# Patient Record
Sex: FEMALE | Race: BLACK OR AFRICAN AMERICAN | NOT HISPANIC OR LATINO | Employment: FULL TIME | URBAN - METROPOLITAN AREA
[De-identification: names, ages, dates, MRNs, and addresses within clinical notes are randomized per-mention and may not be internally consistent; named-entity substitution may affect disease eponyms.]

---

## 2017-01-31 ENCOUNTER — HOSPITAL ENCOUNTER (OUTPATIENT)
Dept: PREADMISSION TESTING | Facility: HOSPITAL | Age: 32
Discharge: HOME | End: 2017-01-31
Attending: OBSTETRICS & GYNECOLOGY
Payer: MEDICAID

## 2017-01-31 VITALS
HEIGHT: 69 IN | DIASTOLIC BLOOD PRESSURE: 80 MMHG | WEIGHT: 250 LBS | SYSTOLIC BLOOD PRESSURE: 132 MMHG | HEART RATE: 80 BPM | OXYGEN SATURATION: 98 % | BODY MASS INDEX: 37.03 KG/M2 | TEMPERATURE: 98 F | RESPIRATION RATE: 16 BRPM

## 2017-01-31 DIAGNOSIS — R10.2 PELVIC PAIN IN FEMALE: ICD-10-CM

## 2017-01-31 LAB
ANION GAP SERPL CALC-SCNC: 7 MMOL/L
APTT BLDCRRT: 28.4 SEC
BASOPHILS # BLD AUTO: 0.01 K/UL
BASOPHILS NFR BLD: 0.2 %
BILIRUB UR QL STRIP: NEGATIVE
BUN SERPL-MCNC: 15 MG/DL
CALCIUM SERPL-MCNC: 8.9 MG/DL
CHLORIDE SERPL-SCNC: 108 MMOL/L
CLARITY UR: CLEAR
CO2 SERPL-SCNC: 24 MMOL/L
COLOR UR: YELLOW
CREAT SERPL-MCNC: 0.7 MG/DL
DIFFERENTIAL METHOD: ABNORMAL
EOSINOPHIL # BLD AUTO: 0.1 K/UL
EOSINOPHIL NFR BLD: 0.9 %
ERYTHROCYTE [DISTWIDTH] IN BLOOD BY AUTOMATED COUNT: 13.1 %
EST. GFR  (AFRICAN AMERICAN): >60 ML/MIN/1.73 M^2
EST. GFR  (NON AFRICAN AMERICAN): >60 ML/MIN/1.73 M^2
GLUCOSE SERPL-MCNC: 89 MG/DL
GLUCOSE UR QL STRIP: NEGATIVE
HCT VFR BLD AUTO: 35.8 %
HGB BLD-MCNC: 11.7 G/DL
HGB UR QL STRIP: NEGATIVE
INR PPP: 1
KETONES UR QL STRIP: NEGATIVE
LEUKOCYTE ESTERASE UR QL STRIP: NEGATIVE
LYMPHOCYTES # BLD AUTO: 2 K/UL
LYMPHOCYTES NFR BLD: 36 %
MCH RBC QN AUTO: 26.2 PG
MCHC RBC AUTO-ENTMCNC: 32.7 %
MCV RBC AUTO: 80 FL
MONOCYTES # BLD AUTO: 0.4 K/UL
MONOCYTES NFR BLD: 7.3 %
NEUTROPHILS # BLD AUTO: 3.1 K/UL
NEUTROPHILS NFR BLD: 55.4 %
NITRITE UR QL STRIP: NEGATIVE
PH UR STRIP: 6 [PH] (ref 5–8)
PLATELET # BLD AUTO: 313 K/UL
PMV BLD AUTO: 10.4 FL
POTASSIUM SERPL-SCNC: 4.2 MMOL/L
PROT UR QL STRIP: NEGATIVE
PROTHROMBIN TIME: 10.4 SEC
RBC # BLD AUTO: 4.47 M/UL
SODIUM SERPL-SCNC: 139 MMOL/L
SP GR UR STRIP: 1.02 (ref 1–1.03)
URN SPEC COLLECT METH UR: NORMAL
UROBILINOGEN UR STRIP-ACNC: NEGATIVE EU/DL
WBC # BLD AUTO: 5.58 K/UL

## 2017-01-31 PROCEDURE — 81003 URINALYSIS AUTO W/O SCOPE: CPT

## 2017-01-31 PROCEDURE — 85025 COMPLETE CBC W/AUTO DIFF WBC: CPT

## 2017-01-31 PROCEDURE — 85730 THROMBOPLASTIN TIME PARTIAL: CPT

## 2017-01-31 PROCEDURE — 36415 COLL VENOUS BLD VENIPUNCTURE: CPT

## 2017-01-31 PROCEDURE — 80048 BASIC METABOLIC PNL TOTAL CA: CPT

## 2017-01-31 PROCEDURE — 85610 PROTHROMBIN TIME: CPT

## 2017-01-31 RX ORDER — ZOLPIDEM TARTRATE 10 MG/1
10 TABLET ORAL NIGHTLY
COMMUNITY
End: 2019-11-21

## 2017-01-31 RX ORDER — ESCITALOPRAM OXALATE 10 MG/1
10 TABLET ORAL DAILY
COMMUNITY
End: 2017-09-05

## 2017-01-31 RX ORDER — ALPRAZOLAM 1 MG/1
1 TABLET ORAL 2 TIMES DAILY PRN
COMMUNITY
End: 2017-09-05

## 2017-01-31 NOTE — PLAN OF CARE
Pre-operative instructions, medication directives and pain scales reviewed with Ms. Silva. All questions the patient had  were answered. Re-assurance about surgical procedure and day of surgery routine given as needed. The patient verbalized understanding of the pre-op instructions.      2/7/17 EXPLORATORY-LAPAROTOMY POSSIBLE PFANNENSTIEL SCAR REVISION

## 2017-01-31 NOTE — IP AVS SNAPSHOT
[place] [address] [address] [place] [address]  Phone: [phone]           Patient Discharge Instructions    Our goal is to set you up for success. This packet includes information on your condition, medications, and your home care. It will help you to care for yourself so you don't get sicker.     Please ask your nurse if you have any questions.        There are many details to remember when preparing for your surgery. Here is what you will need to do, please ask your nurse if there are more specific instructions and if you have any questions:    1. 24 hours before procedure Do not smoke or drink alcoholic beverages 24 hours prior to your procedure    2. Eating before procedure Do not eat or drink anything 8 hours before your procedure - this includes gum, mints, and candy.     3. Day of procedure Please remove all jewelry for the procedure. If you wear contact lenses, dentures, hearing aids or glasses, bring a container to put them in during your surgery and give to a family member for safekeeping.  If your doctor has scheduled you for an overnight stay, bring a small overnight bag with any personal items that you need.    4. After procedure Make arrangements in advance for transportation home by a responsible adult. It is not safe to drive a vehicle during the 24 hours following surgery.     PLEASE NOTE: You may be contacted the day before your surgery to confirm your surgery date and arrival time. The Surgery schedule has many variables which may affect the time of your surgery case. Family members should be available if your surgery time changes.                ** Verify the list of medication(s) below is accurate and up to date. Carry this with you in case of emergency. If your medications have changed, please notify your healthcare provider.             Medication List      TAKE these medications        Additional Info                      alprazolam 1 MG tablet   Commonly known  as:  XANAX   Refills:  0   Dose:  1 mg    Instructions:  Take 1 mg by mouth 2 (two) times daily.     Begin Date    AM    Noon    PM    Bedtime       escitalopram oxalate 10 MG tablet   Commonly known as:  LEXAPRO   Refills:  0   Dose:  10 mg    Instructions:  Take 10 mg by mouth once daily.     Begin Date    AM    Noon    PM    Bedtime       ibuprofen 600 MG tablet   Commonly known as:  ADVIL,MOTRIN   Quantity:  60 tablet   Refills:  1   Dose:  600 mg   What changed:  Another medication with the same name was removed. Continue taking this medication, and follow the directions you see here.    Instructions:  Take 1 tablet (600 mg total) by mouth every 6 (six) hours as needed.     Begin Date    AM    Noon    PM    Bedtime       * oxycodone-acetaminophen 5-325 mg per tablet   Commonly known as:  PERCOCET   Quantity:  20 tablet   Refills:  0   Dose:  1 tablet   What changed:  Another medication with the same name was removed. Continue taking this medication, and follow the directions you see here.    Instructions:  Take 1 tablet by mouth every 4 (four) hours as needed for Pain.     Begin Date    AM    Noon    PM    Bedtime       * oxycodone-acetaminophen 5-325 mg per tablet   Commonly known as:  PERCOCET   Quantity:  30 tablet   Refills:  0   Dose:  1 tablet   What changed:  Another medication with the same name was removed. Continue taking this medication, and follow the directions you see here.    Instructions:  Take 1 tablet by mouth every 4 (four) hours as needed for Pain.     Begin Date    AM    Noon    PM    Bedtime       * oxycodone-acetaminophen 5-325 mg per tablet   Commonly known as:  PERCOCET   Quantity:  10 tablet   Refills:  0   Dose:  1 tablet   What changed:  Another medication with the same name was removed. Continue taking this medication, and follow the directions you see here.    Instructions:  Take 1 tablet by mouth every 4 (four) hours as needed for Pain.     Begin Date    AM    Noon    PM     Bedtime       zolpidem 10 mg Tab   Commonly known as:  AMBIEN   Refills:  0   Dose:  10 mg    Instructions:  Take 10 mg by mouth every evening.     Begin Date    AM    Noon    PM    Bedtime       * Notice:  This list has 3 medication(s) that are the same as other medications prescribed for you. Read the directions carefully, and ask your doctor or other care provider to review them with you.               Please bring to all follow up appointments:    1. A copy of your discharge instructions.  2. All medicines you are currently taking in their original bottles.  3. Identification and insurance card.    Please arrive 15 minutes ahead of scheduled appointment time.    Please call 24 hours in advance if you must reschedule your appointment and/or time.        Your Scheduled Appointments     Feb 06, 2017  9:10 AM CST   Non-Fasting Lab with [facility]    [address]  [facility] [address]   [phone]            Feb 21, 2017  9:10 AM CST   Post Op-OCC with Chad Arambula MD   [facility] ([facility]    [address] [place] [id number]   [phone]              Your Future Surgeries/Procedures     Feb 07, 2017   Surgery with Chad Arambula MD   [facility]    [facility] [address]   [phone]                Discharge Instructions     Future Orders    Type & Screen         Discharge Instructions           YOU WILL HAVE TO GIVE A URINE SPECIMEN TO THE NURSE WHO ADMITS YOU ON THE MORNING OF SURGERY.  Please do not urinate until the nurse gives you a specimen cup.  Thank you.      Your surgery is scheduled for ____TUESDAY 2/7/17________________.    Call [phone] between 2 p.m. and 5 p.m. on   ____Monday 2/6/17_____ to find out your arrival time for the day of your surgery.      Please report to SAME DAY SURGERY UNIT on the 2nd FLOOR at _______ a.m.  Use front door entrance. The  doors open at 0530 am.      INSTRUCTIONS IMPORTANT!!!  ¨ Do not eat or drink after 12 midnight-including water. OK to brush teeth, no   gum, candy or mints!    ¨ Take only these medicines with a small swallow of water-morning of surgery.    Xanax, LEXAPRO, (and Tylenol if needed)    For this procedure you will shower at home the night before and also the morning of surgery with HIBICLENS soap provided by the pre op nurse. Do not use this soap on your face or genitals.  Wash inside your navel (belly button) and the entire abdominal  first.  Allow the soap to sit there at least two full minutes. Complete the shower and rinse thoroughly.     You MAY  shower a third time here at the hospital on the morning of surgery. Rinse completely after each shower.    Please place clean linens on your bed the night before surgery. Please wear fresh clean clothing after each shower.    No shaving of procedural area at least 4-5 days before surgery due to       increased risk of skin irritation and/or possible infection.        XXX____  Return to Hospital Lab on ___Monday 2/6/17 at 9 am_____for additional blood test.    X____  Do not wear makeup, including mascara. WEARING EYE MAKEUP MAY                   LEAD TO SERIOUS EYE INJURY during surgery.  X____  No powder, lotions or creams to surgical area. No Deodorant.  X____  Please remove all jewelry, including piercings and leave at home.  X____  No money or valuables needed. Please leave at home.  You may bring your   cell phone.  X____  If going home the same day, arrange for a ride home. You will not be able to   drive if Anesthesia was used.  X____  Wear loose fitting clothing. Allow for dressings, bandages.  X____  NO  Aspirin, Ibuprofen, Motrin and Aleve at least 3-5 days before     surgery, unless otherwise instructed by your doctor, or the nurse.              You MAY use Tylenol/acetaminophen until day of surgery.  X____  Call MD for temperature above 101 degrees.               "  I have read or had read and explained to me, and understand the above information.  Additional comments or instructions:Please call   [phone] if you have any questions regarding the instructions above.                 Admission Information     Date & Time Provider Department CSN    1/31/2017 10:30 AM Chad Arambula MD [facility] [id number]      Care Providers     Provider Role Specialty Primary office phone    Chad Arambula MD Attending Provider Obstetrics and Gynecology [phone]      Your Vitals Were     BP Pulse Temp Resp Height Weight    132/80 (BP Location: Left arm, Patient Position: Sitting, BP Method: Automatic) 80 97.7 °F (36.5 °C) (Oral) 16 5' 9" (1.753 m) 113.4 kg (250 lb)    Last Period SpO2 BMI          01/05/2017 (Exact Date) 98% 36.92 kg/m2        Recent Lab Values        10/6/2012                           6:20 AM           A1C 5.9                       Allergies as of 1/31/2017        Reactions    Tramadol     Stomach upset and vomiting       [facility]     [facility] Nurse Care Line - 24/7 Assistance  Unless otherwise directed by your provider, please contact [facility], our nurse care line that is available for 24/7 assistance.     Registered nurses in the [facility] provide clinical advisement, health education, appointment booking, and other advisory services.  Call for this free service at [phone].        Advance Directives     An advance directive is a document which, in the event you are no longer able to make decisions for yourself, tells your healthcare team what kind of treatment you do or do not want to receive, or who you would like to make those decisions for you.  If you do not currently have an advance directive, [facility] encourages you to create one.  For more information call:  [phone] ([phone]), [phone] ([phone]),  or log on to [email or web address].        Smoking Cessation     If you would like to " quit smoking:   You may be eligible for free services if you are a [place] resident and started smoking cigarettes before September 1, 1988.  Call the Smoking Cessation Trust (SCT) toll free at (715) 972-1089 or (292) 839-2559.   Call 1-800-QUIT-NOW if you do not meet the above criteria.            Language Assistance Services     ATTENTION: Language assistance services are available, free of charge. Please call [phone].      ATENCIÓN: Si habla maria isabel, tiene a soto disposición servicios gratuitos de asistencia lingüística. Llame al [phone].     CHÚ Ý: N?u b?n nói Ti?ng Vi?t, có các d?ch v? h? tr? ngôn ng? mi?n phí dành cho b?n. G?i s? [phone].        [facility] Sign-Up     Activating your [facility] account is as easy as 1-2-3!     1) Visit [email or web address], select Sign Up Now, enter this activation code and your date of birth, then select Next.  [email or web address]  Expires: 3/17/2017 11:13 AM      2) Create a username and password to use when you visit [facility] in the future and select a security question in case you lose your password and select Next.    3) Enter your e-mail address and click Sign Up!    Additional Information  If you have questions, please e-mail [email or web address] or call [phone] to talk to our [facility] staff. Remember, [facility] is NOT to be used for urgent needs. For medical emergencies, dial 911.          [facility] complies with applicable Federal civil rights laws and does not discriminate on the basis of race, color, national origin, age, disability, or sex.

## 2017-01-31 NOTE — DISCHARGE INSTRUCTIONS
YOU WILL HAVE TO GIVE A URINE SPECIMEN TO THE NURSE WHO ADMITS YOU ON THE MORNING OF SURGERY.  Please do not urinate until the nurse gives you a specimen cup.  Thank you.      Your surgery is scheduled for ____TUESDAY 2/7/17________________.    Call [phone] between 2 p.m. and 5 p.m. on   ____Monday 2/6/17_____ to find out your arrival time for the day of your surgery.      Please report to SAME DAY SURGERY UNIT on the 2nd FLOOR at _______ a.m.  Use front door entrance. The doors open at 0530 am.      INSTRUCTIONS IMPORTANT!!!  ¨ Do not eat or drink after 12 midnight-including water. OK to brush teeth, no   gum, candy or mints!    ¨ Take only these medicines with a small swallow of water-morning of surgery.    Xanax, LEXAPRO, (and Tylenol if needed)    For this procedure you will shower at home the night before and also the morning of surgery with HIBICLENS soap provided by the pre op nurse. Do not use this soap on your face or genitals.  Wash inside your navel (belly button) and the entire abdominal  first.  Allow the soap to sit there at least two full minutes. Complete the shower and rinse thoroughly.     You MAY  shower a third time here at the hospital on the morning of surgery. Rinse completely after each shower.    Please place clean linens on your bed the night before surgery. Please wear fresh clean clothing after each shower.    No shaving of procedural area at least 4-5 days before surgery due to       increased risk of skin irritation and/or possible infection.        XXX____  Return to Hospital Lab on ___Monday 2/6/17 at 9 am_____for additional blood test.    X____  Do not wear makeup, including mascara. WEARING EYE MAKEUP MAY                   LEAD TO SERIOUS EYE INJURY during surgery.  X____  No powder, lotions or creams to surgical area. No Deodorant.  X____  Please remove all jewelry, including piercings and leave at home.  X____  No money or valuables needed. Please leave at home.  You may  bring your   cell phone.  X____  If going home the same day, arrange for a ride home. You will not be able to   drive if Anesthesia was used.  X____  Wear loose fitting clothing. Allow for dressings, bandages.  X____  NO  Aspirin, Ibuprofen, Motrin and Aleve at least 3-5 days before     surgery, unless otherwise instructed by your doctor, or the nurse.              You MAY use Tylenol/acetaminophen until day of surgery.  X____  Call MD for temperature above 101 degrees.                I have read or had read and explained to me, and understand the above information.  Additional comments or instructions:Please call   [phone] if you have any questions regarding the instructions above.

## 2017-02-06 ENCOUNTER — LAB VISIT (OUTPATIENT)
Dept: LAB | Facility: HOSPITAL | Age: 32
End: 2017-02-06
Attending: OBSTETRICS & GYNECOLOGY
Payer: MEDICAID

## 2017-02-06 ENCOUNTER — ANESTHESIA EVENT (OUTPATIENT)
Dept: SURGERY | Facility: HOSPITAL | Age: 32
End: 2017-02-06
Payer: MEDICAID

## 2017-02-06 DIAGNOSIS — R10.2 PELVIC PAIN IN FEMALE: ICD-10-CM

## 2017-02-06 LAB
ABO + RH BLD: NORMAL
BLD GP AB SCN CELLS X3 SERPL QL: NORMAL

## 2017-02-06 PROCEDURE — 86900 BLOOD TYPING SEROLOGIC ABO: CPT

## 2017-02-06 PROCEDURE — 86850 RBC ANTIBODY SCREEN: CPT

## 2017-02-06 PROCEDURE — 36415 COLL VENOUS BLD VENIPUNCTURE: CPT

## 2017-02-07 ENCOUNTER — ANESTHESIA (OUTPATIENT)
Dept: SURGERY | Facility: HOSPITAL | Age: 32
End: 2017-02-07
Payer: MEDICAID

## 2017-02-07 ENCOUNTER — SURGERY (OUTPATIENT)
Age: 32
End: 2017-02-07

## 2017-02-07 ENCOUNTER — HOSPITAL ENCOUNTER (OUTPATIENT)
Facility: HOSPITAL | Age: 32
Discharge: HOME | End: 2017-02-07
Attending: OBSTETRICS & GYNECOLOGY | Admitting: OBSTETRICS & GYNECOLOGY
Payer: MEDICAID

## 2017-02-07 VITALS
HEART RATE: 78 BPM | SYSTOLIC BLOOD PRESSURE: 126 MMHG | TEMPERATURE: 98 F | HEIGHT: 69 IN | BODY MASS INDEX: 37.03 KG/M2 | DIASTOLIC BLOOD PRESSURE: 86 MMHG | OXYGEN SATURATION: 100 % | RESPIRATION RATE: 20 BRPM | WEIGHT: 250 LBS

## 2017-02-07 DIAGNOSIS — R10.2 PELVIC PAIN IN FEMALE: ICD-10-CM

## 2017-02-07 LAB
B-HCG UR QL: NEGATIVE
POCT GLUCOSE: 95 MG/DL (ref 70–110)

## 2017-02-07 PROCEDURE — 71000033 HC RECOVERY, INTIAL HOUR: Performed by: OBSTETRICS & GYNECOLOGY

## 2017-02-07 PROCEDURE — D9220A PRA ANESTHESIA: Mod: CRNA,,, | Performed by: NURSE ANESTHETIST, CERTIFIED REGISTERED

## 2017-02-07 PROCEDURE — 88305 TISSUE EXAM BY PATHOLOGIST: CPT | Mod: 26,,,

## 2017-02-07 PROCEDURE — 71000039 HC RECOVERY, EACH ADD'L HOUR: Performed by: OBSTETRICS & GYNECOLOGY

## 2017-02-07 PROCEDURE — 36000708 HC OR TIME LEV III 1ST 15 MIN: Performed by: OBSTETRICS & GYNECOLOGY

## 2017-02-07 PROCEDURE — 88305 TISSUE EXAM BY PATHOLOGIST: CPT

## 2017-02-07 PROCEDURE — 63600175 PHARM REV CODE 636 W HCPCS: Performed by: ANESTHESIOLOGY

## 2017-02-07 PROCEDURE — 25000003 PHARM REV CODE 250: Performed by: NURSE ANESTHETIST, CERTIFIED REGISTERED

## 2017-02-07 PROCEDURE — 25000003 PHARM REV CODE 250: Performed by: ANESTHESIOLOGY

## 2017-02-07 PROCEDURE — 81025 URINE PREGNANCY TEST: CPT

## 2017-02-07 PROCEDURE — 25000003 PHARM REV CODE 250: Performed by: OBSTETRICS & GYNECOLOGY

## 2017-02-07 PROCEDURE — 71000016 HC POSTOP RECOV ADDL HR: Performed by: OBSTETRICS & GYNECOLOGY

## 2017-02-07 PROCEDURE — 63600175 PHARM REV CODE 636 W HCPCS: Performed by: NURSE ANESTHETIST, CERTIFIED REGISTERED

## 2017-02-07 PROCEDURE — D9220A PRA ANESTHESIA: Mod: ANES,,, | Performed by: ANESTHESIOLOGY

## 2017-02-07 PROCEDURE — 71000015 HC POSTOP RECOV 1ST HR: Performed by: OBSTETRICS & GYNECOLOGY

## 2017-02-07 PROCEDURE — 36000709 HC OR TIME LEV III EA ADD 15 MIN: Performed by: OBSTETRICS & GYNECOLOGY

## 2017-02-07 PROCEDURE — 37000008 HC ANESTHESIA 1ST 15 MINUTES: Performed by: OBSTETRICS & GYNECOLOGY

## 2017-02-07 PROCEDURE — 63600175 PHARM REV CODE 636 W HCPCS

## 2017-02-07 PROCEDURE — 37000009 HC ANESTHESIA EA ADD 15 MINS: Performed by: OBSTETRICS & GYNECOLOGY

## 2017-02-07 PROCEDURE — 27201423 OPTIME MED/SURG SUP & DEVICES STERILE SUPPLY: Performed by: OBSTETRICS & GYNECOLOGY

## 2017-02-07 RX ORDER — SUCCINYLCHOLINE CHLORIDE 20 MG/ML
INJECTION INTRAMUSCULAR; INTRAVENOUS
Status: DISCONTINUED | OUTPATIENT
Start: 2017-02-07 | End: 2017-02-07

## 2017-02-07 RX ORDER — FENTANYL CITRATE 50 UG/ML
25 INJECTION, SOLUTION INTRAMUSCULAR; INTRAVENOUS EVERY 5 MIN PRN
Status: DISCONTINUED | OUTPATIENT
Start: 2017-02-07 | End: 2017-02-07 | Stop reason: HOSPADM

## 2017-02-07 RX ORDER — ONDANSETRON 8 MG/1
8 TABLET, ORALLY DISINTEGRATING ORAL EVERY 8 HOURS PRN
Status: DISCONTINUED | OUTPATIENT
Start: 2017-02-07 | End: 2017-02-07 | Stop reason: HOSPADM

## 2017-02-07 RX ORDER — CEFAZOLIN SODIUM 2 G/50ML
2 SOLUTION INTRAVENOUS
Status: DISCONTINUED | OUTPATIENT
Start: 2017-02-07 | End: 2017-02-07 | Stop reason: HOSPADM

## 2017-02-07 RX ORDER — FENTANYL CITRATE 50 UG/ML
INJECTION, SOLUTION INTRAMUSCULAR; INTRAVENOUS
Status: DISCONTINUED | OUTPATIENT
Start: 2017-02-07 | End: 2017-02-07

## 2017-02-07 RX ORDER — MIDAZOLAM HYDROCHLORIDE 1 MG/ML
INJECTION, SOLUTION INTRAMUSCULAR; INTRAVENOUS
Status: DISCONTINUED | OUTPATIENT
Start: 2017-02-07 | End: 2017-02-07

## 2017-02-07 RX ORDER — DIPHENHYDRAMINE HYDROCHLORIDE 50 MG/ML
25 INJECTION INTRAMUSCULAR; INTRAVENOUS EVERY 6 HOURS PRN
Status: DISCONTINUED | OUTPATIENT
Start: 2017-02-07 | End: 2017-02-07 | Stop reason: HOSPADM

## 2017-02-07 RX ORDER — ONDANSETRON 2 MG/ML
INJECTION INTRAMUSCULAR; INTRAVENOUS
Status: DISCONTINUED | OUTPATIENT
Start: 2017-02-07 | End: 2017-02-07

## 2017-02-07 RX ORDER — LORAZEPAM 2 MG/ML
0.25 INJECTION INTRAMUSCULAR ONCE AS NEEDED
Status: DISCONTINUED | OUTPATIENT
Start: 2017-02-07 | End: 2017-02-07 | Stop reason: HOSPADM

## 2017-02-07 RX ORDER — MEPERIDINE HYDROCHLORIDE 50 MG/ML
12.5 INJECTION INTRAMUSCULAR; INTRAVENOUS; SUBCUTANEOUS ONCE AS NEEDED
Status: DISCONTINUED | OUTPATIENT
Start: 2017-02-07 | End: 2017-02-07 | Stop reason: HOSPADM

## 2017-02-07 RX ORDER — LIDOCAINE HYDROCHLORIDE 10 MG/ML
1 INJECTION, SOLUTION EPIDURAL; INFILTRATION; INTRACAUDAL; PERINEURAL ONCE
Status: DISCONTINUED | OUTPATIENT
Start: 2017-02-07 | End: 2017-02-07 | Stop reason: HOSPADM

## 2017-02-07 RX ORDER — GLYCOPYRROLATE 0.2 MG/ML
INJECTION INTRAMUSCULAR; INTRAVENOUS
Status: DISCONTINUED | OUTPATIENT
Start: 2017-02-07 | End: 2017-02-07

## 2017-02-07 RX ORDER — METOCLOPRAMIDE HYDROCHLORIDE 5 MG/ML
INJECTION INTRAMUSCULAR; INTRAVENOUS
Status: DISCONTINUED | OUTPATIENT
Start: 2017-02-07 | End: 2017-02-07

## 2017-02-07 RX ORDER — DIPHENHYDRAMINE HCL 25 MG
25 CAPSULE ORAL EVERY 4 HOURS PRN
Status: DISCONTINUED | OUTPATIENT
Start: 2017-02-07 | End: 2017-02-07 | Stop reason: HOSPADM

## 2017-02-07 RX ORDER — HYDROMORPHONE HYDROCHLORIDE 2 MG/ML
INJECTION, SOLUTION INTRAMUSCULAR; INTRAVENOUS; SUBCUTANEOUS
Status: DISCONTINUED
Start: 2017-02-07 | End: 2017-02-07 | Stop reason: WASHOUT

## 2017-02-07 RX ORDER — PROPOFOL 10 MG/ML
VIAL (ML) INTRAVENOUS
Status: DISCONTINUED | OUTPATIENT
Start: 2017-02-07 | End: 2017-02-07

## 2017-02-07 RX ORDER — SODIUM CHLORIDE, SODIUM LACTATE, POTASSIUM CHLORIDE, CALCIUM CHLORIDE 600; 310; 30; 20 MG/100ML; MG/100ML; MG/100ML; MG/100ML
INJECTION, SOLUTION INTRAVENOUS CONTINUOUS
Status: DISCONTINUED | OUTPATIENT
Start: 2017-02-07 | End: 2017-02-07 | Stop reason: HOSPADM

## 2017-02-07 RX ORDER — IBUPROFEN 800 MG/1
800 TABLET ORAL EVERY 8 HOURS PRN
Qty: 40 TABLET | Refills: 1 | Status: SHIPPED | OUTPATIENT
Start: 2017-02-07 | End: 2017-09-23

## 2017-02-07 RX ORDER — ROCURONIUM BROMIDE 10 MG/ML
INJECTION, SOLUTION INTRAVENOUS
Status: DISCONTINUED | OUTPATIENT
Start: 2017-02-07 | End: 2017-02-07

## 2017-02-07 RX ORDER — CEFAZOLIN SODIUM 2 G/50ML
SOLUTION INTRAVENOUS
Status: COMPLETED
Start: 2017-02-07 | End: 2017-02-07

## 2017-02-07 RX ORDER — LORAZEPAM 2 MG/ML
INJECTION INTRAMUSCULAR
Status: DISCONTINUED
Start: 2017-02-07 | End: 2017-02-07 | Stop reason: WASHOUT

## 2017-02-07 RX ORDER — OXYCODONE AND ACETAMINOPHEN 5; 325 MG/1; MG/1
1 TABLET ORAL EVERY 4 HOURS PRN
Qty: 30 TABLET | Refills: 0 | Status: SHIPPED | OUTPATIENT
Start: 2017-02-07 | End: 2017-02-23

## 2017-02-07 RX ORDER — OXYCODONE AND ACETAMINOPHEN 10; 325 MG/1; MG/1
1 TABLET ORAL EVERY 4 HOURS PRN
Status: DISCONTINUED | OUTPATIENT
Start: 2017-02-07 | End: 2017-02-07 | Stop reason: HOSPADM

## 2017-02-07 RX ORDER — DIPHENHYDRAMINE HYDROCHLORIDE 50 MG/ML
12.5 INJECTION INTRAMUSCULAR; INTRAVENOUS ONCE
Status: COMPLETED | OUTPATIENT
Start: 2017-02-07 | End: 2017-02-07

## 2017-02-07 RX ORDER — OXYCODONE AND ACETAMINOPHEN 5; 325 MG/1; MG/1
1 TABLET ORAL EVERY 4 HOURS PRN
Qty: 50 TABLET | Refills: 0 | Status: SHIPPED | OUTPATIENT
Start: 2017-02-07 | End: 2017-02-07

## 2017-02-07 RX ADMIN — FENTANYL CITRATE 25 MCG: 50 INJECTION, SOLUTION INTRAMUSCULAR; INTRAVENOUS at 10:02

## 2017-02-07 RX ADMIN — PROPOFOL 50 MG: 10 INJECTION, EMULSION INTRAVENOUS at 10:02

## 2017-02-07 RX ADMIN — PROPOFOL 30 MG: 10 INJECTION, EMULSION INTRAVENOUS at 10:02

## 2017-02-07 RX ADMIN — FENTANYL CITRATE 25 MCG: 50 INJECTION, SOLUTION INTRAMUSCULAR; INTRAVENOUS at 11:02

## 2017-02-07 RX ADMIN — GLYCOPYRROLATE 0.2 MG: 0.2 INJECTION, SOLUTION INTRAMUSCULAR; INTRAVENOUS at 09:02

## 2017-02-07 RX ADMIN — PROPOFOL 150 MG: 10 INJECTION, EMULSION INTRAVENOUS at 09:02

## 2017-02-07 RX ADMIN — FENTANYL CITRATE 100 MCG: 50 INJECTION INTRAMUSCULAR; INTRAVENOUS at 09:02

## 2017-02-07 RX ADMIN — ROCURONIUM BROMIDE 10 MG: 10 INJECTION, SOLUTION INTRAVENOUS at 10:02

## 2017-02-07 RX ADMIN — ROCURONIUM BROMIDE 25 MG: 10 INJECTION, SOLUTION INTRAVENOUS at 09:02

## 2017-02-07 RX ADMIN — PROPOFOL 50 MG: 10 INJECTION, EMULSION INTRAVENOUS at 09:02

## 2017-02-07 RX ADMIN — SODIUM CHLORIDE, SODIUM LACTATE, POTASSIUM CHLORIDE, AND CALCIUM CHLORIDE: .6; .31; .03; .02 INJECTION, SOLUTION INTRAVENOUS at 08:02

## 2017-02-07 RX ADMIN — FENTANYL CITRATE 50 MCG: 50 INJECTION INTRAMUSCULAR; INTRAVENOUS at 10:02

## 2017-02-07 RX ADMIN — MIDAZOLAM HYDROCHLORIDE 2 MG: 1 INJECTION, SOLUTION INTRAMUSCULAR; INTRAVENOUS at 09:02

## 2017-02-07 RX ADMIN — SUCCINYLCHOLINE CHLORIDE 100 MG: 20 INJECTION, SOLUTION INTRAMUSCULAR; INTRAVENOUS at 09:02

## 2017-02-07 RX ADMIN — ROCURONIUM BROMIDE 5 MG: 10 INJECTION, SOLUTION INTRAVENOUS at 09:02

## 2017-02-07 RX ADMIN — ONDANSETRON 4 MG: 2 INJECTION, SOLUTION INTRAMUSCULAR; INTRAVENOUS at 09:02

## 2017-02-07 RX ADMIN — CEFAZOLIN SODIUM 2 G: 2 SOLUTION INTRAVENOUS at 09:02

## 2017-02-07 RX ADMIN — METOCLOPRAMIDE 10 MG: 5 INJECTION, SOLUTION INTRAMUSCULAR; INTRAVENOUS at 09:02

## 2017-02-07 RX ADMIN — DIPHENHYDRAMINE HYDROCHLORIDE 12.5 MG: 50 INJECTION INTRAMUSCULAR; INTRAVENOUS at 12:02

## 2017-02-07 RX ADMIN — FENTANYL CITRATE 50 MCG: 50 INJECTION INTRAMUSCULAR; INTRAVENOUS at 09:02

## 2017-02-07 RX ADMIN — OXYCODONE HYDROCHLORIDE AND ACETAMINOPHEN 1 TABLET: 10; 325 TABLET ORAL at 01:02

## 2017-02-07 NOTE — ANESTHESIA PREPROCEDURE EVALUATION
02/07/2017  Darlin Silva is a 31 y.o., female.    [facility] Anesthesia Evaluation    I have reviewed the Patient Summary Reports.     I have reviewed the Medications.     Review of Systems  Anesthesia Hx:  History of prior surgery of interest to airway management or planning:  Denies Personal Hx of Anesthesia complications.   Hematology/Oncology:         -- Anemia:   Cardiovascular:  Cardiovascular Normal     Pulmonary:  Pulmonary Normal    Renal/:  Renal/ Normal     Hepatic/GI:   GERD    Neurological:   Chronic Pain Syndrome   Endocrine:  Endocrine Normal    Psych:   Psychiatric History anxiety depression          Physical Exam  General:  Well nourished, Obesity    Airway/Jaw/Neck:  Airway Findings: Mouth Opening: Normal Tongue: Normal  General Airway Assessment: Adult  Mallampati: II  TM Distance: 4 - 6 cm  Jaw/Neck Findings:  Neck ROM: Normal ROM      Dental:  Dental Findings: In tact   Chest/Lungs:  Chest/Lungs Findings: Normal Respiratory Rate     Heart/Vascular:  Heart Findings: Rate: Normal        Mental Status:  Mental Status Findings:  Cooperative         Anesthesia Plan  Type of Anesthesia, risks & benefits discussed:  Anesthesia Type:  general  Patient's Preference:   Intra-op Monitoring Plan: standard ASA monitors  Intra-op Monitoring Plan Comments:   Post Op Pain Control Plan:   Post Op Pain Control Plan Comments:   Induction:   IV  Beta Blocker:  Patient is not currently on a Beta-Blocker (No further documentation required).       Informed Consent: Patient understands risks and agrees with Anesthesia plan.  Questions answered. Anesthesia consent signed with patient.  ASA Score: 2     Day of Surgery Review of History & Physical:    H&P update referred to the provider.         Ready For Surgery From Anesthesia Perspective.
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
## 2017-02-07 NOTE — TRANSFER OF CARE
"Anesthesia Transfer of Care Note    Patient: Darlin Silva    Procedure(s) Performed: Procedure(s) (LRB):  LAPAROSCOPY-DIAGNOSTIC, W/ LYSIS OF ADHESIONS (N/A)  RESECTION-ENDOMETRIOSIS (N/A)    Patient location: PACU    Anesthesia Type: general    Transport from OR: Transported from OR on room air with adequate spontaneous ventilation    Post pain: adequate analgesia    Post assessment: no apparent anesthetic complications and tolerated procedure well    Post vital signs: stable    Level of consciousness: awake, alert and oriented    Nausea/Vomiting: no nausea/vomiting    Complications: none          Last vitals:   Visit Vitals    BP (!) 180/110    Pulse 99    Temp 37 °C (98.6 °F) (Oral)    Resp 14    Ht 5' 9" (1.753 m)    Wt 113.4 kg (250 lb)    LMP 02/05/2017 (Exact Date)    SpO2 100%    Breastfeeding No    BMI 36.92 kg/m2     "

## 2017-02-07 NOTE — IP AVS SNAPSHOT
[place] [address] [place] [address]  Phone: [phone]           Patient Discharge Instructions     Our goal is to set you up for success. This packet includes information on your condition, medications, and your home care. It will help you to care for yourself so you don't get sicker and need to go back to the hospital.     Please ask your nurse if you have any questions.        There are many details to remember when preparing to leave the hospital. Here is what you will need to do:    1. Take your medicine. If you are prescribed medications, review your Medication List in the following pages. You may have new medications to  at the pharmacy and others that you'll need to stop taking. Review the instructions for how and when to take your medications. Talk with your doctor or nurses if you are unsure of what to do.     2. Go to your follow-up appointments. Specific follow-up information is listed in the following pages. Your may be contacted by a transition nurse or clinical provider about future appointments. Be sure we have all of the phone numbers to reach you, if needed. Please contact your provider's office if you are unable to make an appointment.     3. Watch for warning signs. Your doctor or nurse will give you detailed warning signs to watch for and when to call for assistance. These instructions may also include educational information about your condition. If you experience any of warning signs to your health, call your doctor.               ** Verify the list of medication(s) below is accurate and up to date. Carry this with you in case of emergency. If your medications have changed, please notify your healthcare provider.             Medication List      CHANGE how you take these medications        Additional Info                      * ibuprofen 600 MG tablet   Commonly known as:  ADVIL,MOTRIN   Quantity:  60 tablet   Refills:  1   Dose:  600 mg   What  changed:  Another medication with the same name was added. Make sure you understand how and when to take each.    Instructions:  Take 1 tablet (600 mg total) by mouth every 6 (six) hours as needed.     Begin Date    AM    Noon    PM    Bedtime       * ibuprofen 800 MG tablet   Commonly known as:  ADVIL,MOTRIN   Quantity:  40 tablet   Refills:  1   Dose:  800 mg   What changed:  You were already taking a medication with the same name, and this prescription was added. Make sure you understand how and when to take each.    Instructions:  Take 1 tablet (800 mg total) by mouth every 8 (eight) hours as needed for Pain.     Begin Date    AM    Noon    PM    Bedtime       oxycodone-acetaminophen 5-325 mg per tablet   Commonly known as:  PERCOCET   Quantity:  30 tablet   Refills:  0   Dose:  1 tablet   What changed:  Another medication with the same name was removed. Continue taking this medication, and follow the directions you see here.    Instructions:  Take 1 tablet by mouth every 4 (four) hours as needed for Pain.     Begin Date    AM    Noon    PM    Bedtime       * Notice:  This list has 2 medication(s) that are the same as other medications prescribed for you. Read the directions carefully, and ask your doctor or other care provider to review them with you.      CONTINUE taking these medications        Additional Info                      alprazolam 1 MG tablet   Commonly known as:  XANAX   Refills:  0   Dose:  1 mg    Instructions:  Take 1 mg by mouth 2 (two) times daily as needed.     Begin Date    AM    Noon    PM    Bedtime       escitalopram oxalate 10 MG tablet   Commonly known as:  LEXAPRO   Refills:  0   Dose:  10 mg    Instructions:  Take 10 mg by mouth once daily.     Begin Date    AM    Noon    PM    Bedtime       zolpidem 10 mg Tab   Commonly known as:  AMBIEN   Refills:  0   Dose:  10 mg    Instructions:  Take 10 mg by mouth every evening.     Begin Date    AM    Noon    PM    Bedtime            Where to  Get Your Medications      These medications were sent to [facility]/pharmacy #[id number] - [place], [place] - [address] [facility]  [address] [facility] [address]     Phone:  [phone]     ibuprofen 800 MG tablet    oxycodone-acetaminophen 5-325 mg per tablet                  Please bring to all follow up appointments:    1. A copy of your discharge instructions.  2. All medicines you are currently taking in their original bottles.  3. Identification and insurance card.    Please arrive 15 minutes ahead of scheduled appointment time.    Please call 24 hours in advance if you must reschedule your appointment and/or time.        Your Scheduled Appointments     Feb 21, 2017  9:10 AM CST   Post Op-[facility] with Chad Arambula MD   [facility] ([facility])    [address] [place] [address] [phone]              Follow-up Information     Follow up with Chad Arambula MD In 1 week.    Specialty:  Obstetrics and Gynecology    Contact information:    [address] [place] [address] [address]  [address]  [facility] [address] [phone]            Discharge Instructions       Pain Medication Oxycodone Acetaminophen  mg given at 13:08.    DIET: You may resume your home diet. If nausea is present, increase your diet gradually with fluids and bland foods    ACTIVITY LEVEL: If you have received sedation or an anesthetic, you may feel sleepy for several hours. Rest until you are more awake. Gradually resume your normal activities    Medications: Pain medication should be taken only if needed and as directed. If antibiotics are prescribed, the medication should be taken until completed. You will be given an updated list of you medications.    No driving, alcoholic beverages or signing legal documents for next 24 hours or while taking pain medication.       CALL THE DOCTOR:    For any obvious bleeding (some dried blood over the incision is normal).      Redness, swelling, foul smell around incision or fever over  "101.   Shortness of breath, Coughing up Bloody sputum, Pains or Swelling in your Calves .   Persistent pain or nausea not relieved by medication.    If any unusual problems or difficulties occur contact your doctor. If you cannot contact your doctor but feel your signs and symptoms warrant a physicians attention return to the emergency room.      Discharge References/Attachments     ENDOMETRIOSIS, LAPAROSCOPIC TREATMENT OF, DISCHARGE INSTRUCTIONS (ENGLISH)    ACETAMINOPHEN; OXYCODONE TABLETS (ENGLISH)        Primary Diagnosis     Your primary diagnosis was:  Pain In Female Pelvis      Admission Information     Date & Time Provider Department CSN    2/7/2017  6:57 AM Chad Arambula MD [facility] [id number]      Care Providers     Provider Role Specialty Primary office phone    Chad Arambula MD Attending Provider Obstetrics and Gynecology [phone]    Chad Arambula MD Surgeon  Obstetrics and Gynecology [phone]      Your Vitals Were     BP Pulse Temp Resp Height Weight    125/88 (BP Location: Right arm, Patient Position: Lying, BP Method: Automatic) 75 97.6 °F (36.4 °C) (Oral) 18 5' 9" (1.753 m) 113.4 kg (250 lb)    Last Period SpO2 BMI          02/05/2017 (Exact Date) 100% 36.92 kg/m2        Recent Lab Values        10/6/2012                           6:20 AM           A1C 5.9                       Pending Labs     Order Current Status    Specimen to Pathology - Surgery In process      Allergies as of 2/7/2017        Reactions    Tramadol     Stomach upset and vomiting       [facility]     [facility] Nurse Care Line - 24/7 Assistance  Unless otherwise directed by your provider, please contact [facility], our nurse care line that is available for 24/7 assistance.     Registered nurses in the [facility] provide clinical advisement, health education, appointment booking, and other advisory services.  Call for this free service at [phone].        Advance " Directives     An advance directive is a document which, in the event you are no longer able to make decisions for yourself, tells your healthcare team what kind of treatment you do or do not want to receive, or who you would like to make those decisions for you.  If you do not currently have an advance directive, [facility] encourages you to create one.  For more information call:  [phone] ([phone]), [phone] ([phone]),  or log on to [email or web address].        Smoking Cessation     If you would like to quit smoking:   You may be eligible for free services if you are a [place] resident and started smoking cigarettes before September 1, 1988.  Call the Smoking Cessation Trust (SCT) toll free at (828) 101-9258 or (372) 889-5877.   Call 5-047-QUIT-NOW if you do not meet the above criteria.            Language Assistance Services     ATTENTION: Language assistance services are available, free of charge. Please call [phone].      ATENCIÓN: Si habla español, tiene a soto disposición servicios gratuitos de asistencia lingüística. Llame al [phone].     CHÚ Ý: N?u b?n nói Ti?ng Vi?t, có các d?ch v? h? tr? ngôn ng? mi?n phí dành cho b?n. G?i s? [phone].        [facility] Sign-Up     Activating your [facility] account is as easy as 1-2-3!     1) Visit [email or web address], select Sign Up Now, enter this activation code and your date of birth, then select Next.  [email or web address]  Expires: 3/17/2017 11:13 AM      2) Create a username and password to use when you visit [facility] in the future and select a security question in case you lose your password and select Next.    3) Enter your e-mail address and click Sign Up!    Additional Information  If you have questions, please e-mail [email or web address] or call [phone] to talk to our [facility] staff. Remember, [facility] is NOT to be used for urgent needs. For medical emergencies, dial 911.          [facility] complies  with applicable Federal civil rights laws and does not discriminate on the basis of race, color, national origin, age, disability, or sex.

## 2017-02-07 NOTE — ANESTHESIA POSTPROCEDURE EVALUATION
"Anesthesia Post Evaluation    Patient: Darlin Silva    Procedure(s) Performed: Procedure(s) (LRB):  LAPAROSCOPY-DIAGNOSTIC, W/ LYSIS OF ADHESIONS (N/A)  RESECTION-ENDOMETRIOSIS (N/A)    Final Anesthesia Type: general  Patient location during evaluation: PACU  Patient participation: Yes- Able to Participate  Level of consciousness: awake  Post-procedure vital signs: reviewed and stable  Pain management: adequate  Airway patency: patent  PONV status at discharge: No PONV  Anesthetic complications: no      Cardiovascular status: stable  Respiratory status: unassisted  Hydration status: euvolemic  Follow-up not needed.        Visit Vitals    /60    Pulse (!) 56    Temp 37 °C (98.6 °F) (Oral)    Resp 12    Ht 5' 9" (1.753 m)    Wt 113.4 kg (250 lb)    LMP 02/05/2017 (Exact Date)    SpO2 100%    Breastfeeding No    BMI 36.92 kg/m2       Pain/Perfecto Score: Pain Assessment Performed: Yes (2/7/2017 10:39 AM)  Presence of Pain: complains of pain/discomfort (2/7/2017 10:39 AM)  Pain Rating Prior to Med Admin: 5 (2/7/2017 11:28 AM)  Perfecto Score: 10 (2/7/2017 11:03 AM)      "

## 2017-02-07 NOTE — DISCHARGE INSTRUCTIONS
Pain Medication Oxycodone Acetaminophen  mg given at 13:08.    DIET: You may resume your home diet. If nausea is present, increase your diet gradually with fluids and bland foods    ACTIVITY LEVEL: If you have received sedation or an anesthetic, you may feel sleepy for several hours. Rest until you are more awake. Gradually resume your normal activities    Medications: Pain medication should be taken only if needed and as directed. If antibiotics are prescribed, the medication should be taken until completed. You will be given an updated list of you medications.    No driving, alcoholic beverages or signing legal documents for next 24 hours or while taking pain medication.       CALL THE DOCTOR:    For any obvious bleeding (some dried blood over the incision is normal).      Redness, swelling, foul smell around incision or fever over 101.   Shortness of breath, Coughing up Bloody sputum, Pains or Swelling in your Calves .   Persistent pain or nausea not relieved by medication.    If any unusual problems or difficulties occur contact your doctor. If you cannot contact your doctor but feel your signs and symptoms warrant a physicians attention return to the emergency room.
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
## 2017-02-07 NOTE — INTERVAL H&P NOTE
The patient has been examined and the H&P has been reviewed:    I concur with the findings and no changes have occurred since H&P was written.    Anesthesia/Surgery risks, benefits and alternative options discussed and understood by patient/family.          Active Hospital Problems    Diagnosis  POA    Pelvic pain in female [R10.2]  Yes      Resolved Hospital Problems    Diagnosis Date Resolved POA   No resolved problems to display.

## 2017-02-07 NOTE — OP NOTE
DATE OF PROCEDURE:  2017.    PREOPERATIVE DIAGNOSES:  1.  Pelvic pain.  2.  History of previous  section.    POSTOPERATIVE DIAGNOSES:  1.  Pelvic pain.  2.  History of previous  section.    PROCEDURE:  Diagnostic laparoscopy with ablation of endometriosis implants and   lysis of adhesions.    SURGEON:  Chad Arambula M.D.    ANESTHESIA:  General.    COMPLICATIONS:  None.    ESTIMATED BLOOD LOSS:  Minimal.    FINDINGS:  Normal uterus, tubes and ovaries of appropriate size and shape with   endometriosis implant in the anterior cul-de-sac as well as the interior aspect   of the uterus also vesicular endometriosis in the posterior cul-de-sac and   adhesions in the posterior cul-de-sac and the left ovarian fossa.  Endometriosis   implant to the right ovary.    PROCEDURE IN DETAIL:  After assuring informed consent, the patient was taken to   the Operating Room where general anesthesia was initiated.  She was placed in   lithotomy position with arms tucked and the hips flexed.  She was prepped and   draped in the usual sterile fashion and a Bean catheter was placed under   sterile conditions.  After this, a LU type uterine manipulator was inserted as   a means to manipulate the uterus.  Gloves were changed and attention was turned   to the patient's abdomen where a 5-mm skin incision was made deep in the   umbilicus.  From here, a Veress needle was inserted under direct pressure from   the insufflator device.  Then, 4 liters of carbon dioxide gas were used to   insufflate the abdomen.  After doing this, a 5-mm optical access trocar was   advanced into the patient's abdomen.  From here, the patient was placed in deep   Trendelenburg position and the anatomy of the uterus was appreciated and the   anatomical findings were demonstrated.  From here, a suprapubic 5-mm trocar was   placed under direct laparoscopic visualization as well as a left lower quadrant   trocar.  This was also 5-mm trocar.  From  here, the endometriosis implants in   the anterior cul-de-sac and portion of the uterus were easily cauterized under   low voltage.  After this, the adhesion in the posterior cul-de-sac was carefully   taken down and the adhesions itself was sent to pathology as peritoneal biopsy.    There was some vesicular endometriosis lesions in the posterior cul-de-sac   which were cauterized under low voltage.  After this, a fairly large   endometriosis implant was isolated on the posterior aspect of the right ovary.    This was also cauterized under low voltage.  There was some scarring from the   left ovary to the ovarian fossa.  This was carefully dissected away under direct   laparoscopic visualization.  All areas that appeared to be endometriosis were   cauterized under low voltage.  This concluded the case.  The carbon dioxide gas   was allowed to escape from the patient's abdomen and the trocars were removed.    Each of the trocar site was injected with 0.25% Marcaine and the skin was closed   with 4-0 Monocryl.  The Hulka type uterine manipulator was removed as was the   Bean catheter.  The patient was taken to the Recovery Room awakened in stable   condition.      POLY  dd: 02/07/2017 10:37:30 (CST)  td: 02/07/2017 11:34:28 (CST)  Doc ID   #[id number]  Job ID #[id number]    CC:

## 2017-02-13 ENCOUNTER — OFFICE VISIT (OUTPATIENT)
Dept: FAMILY MEDICINE | Facility: CLINIC | Age: 32
End: 2017-02-13
Payer: MEDICAID

## 2017-02-13 VITALS
TEMPERATURE: 98 F | DIASTOLIC BLOOD PRESSURE: 92 MMHG | SYSTOLIC BLOOD PRESSURE: 138 MMHG | BODY MASS INDEX: 37.29 KG/M2 | HEART RATE: 89 BPM | OXYGEN SATURATION: 97 % | WEIGHT: 251.75 LBS | RESPIRATION RATE: 12 BRPM | HEIGHT: 69 IN

## 2017-02-13 DIAGNOSIS — M25.531 RIGHT WRIST PAIN: ICD-10-CM

## 2017-02-13 PROCEDURE — 99999 PR PBB SHADOW E&M-EST. PATIENT-LVL IV: CPT | Mod: PBBFAC,,, | Performed by: PHYSICIAN ASSISTANT

## 2017-02-13 PROCEDURE — 99214 OFFICE O/P EST MOD 30 MIN: CPT | Mod: S$PBB,,, | Performed by: PHYSICIAN ASSISTANT

## 2017-02-13 PROCEDURE — 99214 OFFICE O/P EST MOD 30 MIN: CPT | Mod: PBBFAC,PO | Performed by: PHYSICIAN ASSISTANT

## 2017-02-13 RX ORDER — METHYLPREDNISOLONE 4 MG/1
TABLET ORAL
Qty: 1 PACKAGE | Refills: 0 | Status: SHIPPED | OUTPATIENT
Start: 2017-02-13 | End: 2017-02-23

## 2017-02-13 NOTE — MR AVS SNAPSHOT
[facility]  [address]  [place] [address]  Phone: [phone]  Fax: [phone]                  Darlin Silva   2017 1:00 PM   Office Visit    Description:  Female : 1985   Provider:  Randi Hopkins PA-C   Department:  [facility]           Reason for Visit     Hand Pain           Diagnoses this Visit        Comments    Right wrist pain                To Do List           Goals (5 Years of Data)     None       These Medications        Disp Refills Start End    methylPREDNISolone (MEDROL DOSEPACK) 4 mg tablet 1 Package 0 2017     use as directed    Pharmacy: [facility]/pharmacy #[id number] - [place], [place] - [address] [facility] #: [phone]         [facility]     [facility] Care Line -  Assistance  Registered nurses in the [facility] provide clinical advisement, health education, appointment booking, and other advisory services.  Call for this free service at [phone].             Medications           Message regarding Medications     Verify the changes and/or additions to your medication regime listed below are the same as discussed with your clinician today.  If any of these changes or additions are incorrect, please notify your healthcare provider.        START taking these NEW medications        Refills    methylPREDNISolone (MEDROL DOSEPACK) 4 mg tablet 0    Sig: use as directed    Class: Normal           Verify that the below list of medications is an accurate representation of the medications you are currently taking.  If none reported, the list may be blank. If incorrect, please contact your healthcare provider. Carry this list with you in case of emergency.           Current Medications     alprazolam (XANAX) 1 MG tablet Take 1 mg by mouth 2 (two) times daily as needed.     escitalopram oxalate (LEXAPRO) 10 MG tablet Take 10 mg by mouth once daily.    oxycodone-acetaminophen (PERCOCET) 5-325 mg  "per tablet Take 1 tablet by mouth every 4 (four) hours as needed for Pain.    zolpidem (AMBIEN) 10 mg Tab Take 10 mg by mouth every evening.    ibuprofen (ADVIL,MOTRIN) 600 MG tablet Take 1 tablet (600 mg total) by mouth every 6 (six) hours as needed.    ibuprofen (ADVIL,MOTRIN) 800 MG tablet Take 1 tablet (800 mg total) by mouth every 8 (eight) hours as needed for Pain.    methylPREDNISolone (MEDROL DOSEPACK) 4 mg tablet use as directed           Clinical Reference Information           Your Vitals Were     BP Pulse Temp Resp Height Weight    138/92 (BP Location: Right arm, Patient Position: Sitting, BP Method: Manual) 89 98.4 °F (36.9 °C) (Oral) 12 5' 9" (1.753 m) 114.2 kg (251 lb 12.3 oz)    Last Period SpO2 BMI          02/05/2017 (Exact Date) 97% 37.18 kg/m2        Blood Pressure          Most Recent Value    BP  (!)  138/92      Allergies as of 2/13/2017     Tramadol      Immunizations Administered on Date of Encounter - 2/13/2017     None      Orders Placed During Today's Visit     Future Labs/Procedures Expected by Expires    Uric acid  2/13/2017 2/13/2018    X-Ray Wrist 2 View Right  2/13/2017 2/13/2018      Language Assistance Services     ATTENTION: Language assistance services are available, free of charge. Please call [phone].      ATENCIÓN: Si habla maria isabel, tiene a soto disposición servicios gratuitos de asistencia lingüística. Llame al [phone].     CHÚ Ý: N?u b?n nói Ti?ng Vi?t, có các d?ch v? h? tr? ngôn ng? mi?n phí dành cho b?n. G?i s? [phone].         [facility] complies with applicable Federal civil rights laws and does not discriminate on the basis of race, color, national origin, age, disability, or sex.        "
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
## 2017-02-21 ENCOUNTER — NURSE TRIAGE (OUTPATIENT)
Dept: ADMINISTRATIVE | Facility: CLINIC | Age: 32
End: 2017-02-21

## 2017-02-22 ENCOUNTER — OFFICE VISIT (OUTPATIENT)
Dept: FAMILY MEDICINE | Facility: CLINIC | Age: 32
End: 2017-02-22
Payer: MEDICAID

## 2017-02-22 ENCOUNTER — HOSPITAL ENCOUNTER (OUTPATIENT)
Dept: RADIOLOGY | Facility: HOSPITAL | Age: 32
Discharge: HOME | End: 2017-02-22
Attending: FAMILY MEDICINE
Payer: MEDICAID

## 2017-02-22 VITALS
SYSTOLIC BLOOD PRESSURE: 128 MMHG | WEIGHT: 252.88 LBS | DIASTOLIC BLOOD PRESSURE: 88 MMHG | RESPIRATION RATE: 16 BRPM | HEART RATE: 68 BPM | OXYGEN SATURATION: 99 % | BODY MASS INDEX: 37.45 KG/M2 | HEIGHT: 69 IN | TEMPERATURE: 98 F

## 2017-02-22 DIAGNOSIS — M25.531 RIGHT WRIST PAIN: ICD-10-CM

## 2017-02-22 DIAGNOSIS — M25.531 RIGHT WRIST PAIN: Primary | ICD-10-CM

## 2017-02-22 PROCEDURE — 99214 OFFICE O/P EST MOD 30 MIN: CPT | Mod: S$PBB,,, | Performed by: FAMILY MEDICINE

## 2017-02-22 PROCEDURE — 99284 EMERGENCY DEPT VISIT MOD MDM: CPT | Mod: 25,27

## 2017-02-22 PROCEDURE — 96372 THER/PROPH/DIAG INJ SC/IM: CPT

## 2017-02-22 PROCEDURE — 81025 URINE PREGNANCY TEST: CPT | Performed by: EMERGENCY MEDICINE

## 2017-02-22 PROCEDURE — 99999 PR PBB SHADOW E&M-EST. PATIENT-LVL III: CPT | Mod: PBBFAC,,, | Performed by: FAMILY MEDICINE

## 2017-02-22 PROCEDURE — 73110 X-RAY EXAM OF WRIST: CPT | Mod: 26,RT,, | Performed by: RADIOLOGY

## 2017-02-22 RX ORDER — KETOROLAC TROMETHAMINE 30 MG/ML
60 INJECTION, SOLUTION INTRAMUSCULAR; INTRAVENOUS
Status: COMPLETED | OUTPATIENT
Start: 2017-02-22 | End: 2017-02-22

## 2017-02-22 RX ADMIN — KETOROLAC TROMETHAMINE 60 MG: 60 INJECTION, SOLUTION INTRAMUSCULAR at 12:02

## 2017-02-22 NOTE — PROGRESS NOTES
..Patient given toradol 60 mg injection right ventrogluteal, tolerated well, no complaints, no reaction noted

## 2017-02-22 NOTE — MR AVS SNAPSHOT
[facility]  [address]  [place] [address]  Phone: [phone]  Fax: [phone]                  Darlin Silva   2017 11:00 AM   Office Visit    Description:  Female : 1985   Provider:  Chad Greer MD   Department:  [facility]           Reason for Visit     Shoulder Pain     wrist pain and swollen           Diagnoses this Visit        Comments    Right wrist pain    -  Primary            To Do List           Goals (5 Years of Data)     None      [facility] On Call     [facility] On Call Nurse Care Line -  Assistance  Registered nurses in the [facility] On Call Center provide clinical advisement, health education, appointment booking, and other advisory services.  Call for this free service at [phone].             Medications           Message regarding Medications     Verify the changes and/or additions to your medication regime listed below are the same as discussed with your clinician today.  If any of these changes or additions are incorrect, please notify your healthcare provider.        These medications were administered today        Dose Freq    ketorolac injection 60 mg 60 mg Clinic/HOD 1 time    Sig: Inject 2 mLs (60 mg total) into the muscle one time.    Class: Normal    Route: Intramuscular           Verify that the below list of medications is an accurate representation of the medications you are currently taking.  If none reported, the list may be blank. If incorrect, please contact your healthcare provider. Carry this list with you in case of emergency.           Current Medications     alprazolam (XANAX) 1 MG tablet Take 1 mg by mouth 2 (two) times daily as needed.     escitalopram oxalate (LEXAPRO) 10 MG tablet Take 10 mg by mouth once daily.    ibuprofen (ADVIL,MOTRIN) 800 MG tablet Take 1 tablet (800 mg total) by mouth every 8 (eight) hours as needed for Pain.    zolpidem (AMBIEN) 10 mg Tab Take 10 mg by mouth every evening.     "methylPREDNISolone (MEDROL DOSEPACK) 4 mg tablet use as directed    oxycodone-acetaminophen (PERCOCET) 5-325 mg per tablet Take 1 tablet by mouth every 4 (four) hours as needed for Pain.           Clinical Reference Information           Your Vitals Were     BP Pulse Temp Resp Height Weight    128/88 (BP Location: Left arm, Patient Position: Sitting, BP Method: Manual) 68 98.2 °F (36.8 °C) (Oral) 16 5' 9" (1.753 m) 114.7 kg (252 lb 13.9 oz)    Last Period SpO2 BMI          02/05/2017 (Exact Date) 99% 37.34 kg/m2        Blood Pressure          Most Recent Value    BP  128/88      Allergies as of 2/22/2017     Tramadol      Immunizations Administered on Date of Encounter - 2/22/2017     None      Orders Placed During Today's Visit     Future Labs/Procedures Expected by Expires    X-Ray Wrist Complete Right  2/22/2017 2/22/2018      Language Assistance Services     ATTENTION: Language assistance services are available, free of charge. Please call [phone].      ATENCIÓN: Si tiola maria isabel, tiene a soto disposición servicios gratuitos de asistencia lingüística. Llame al [phone].     SARIKA Ý: N?u b?n nói Ti?ng Vi?t, có các d?ch v? h? tr? ngôn ng? mi?n phí dành cho b?n. G?i s? [phone].         [facility] complies with applicable Federal civil rights laws and does not discriminate on the basis of race, color, national origin, age, disability, or sex.        "

## 2017-02-22 NOTE — TELEPHONE ENCOUNTER
Pt states she was dx about 1 yr ago w/ torn rotator cuff/ problem resolved but tonight she is having shoulder pain and arm & wrist pain in the same arm/ not sure if she should see PCP or orthopedics    Advised she call ortho in AM since this is recurrent problem    Richa Doe RN
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
## 2017-02-23 ENCOUNTER — HOSPITAL ENCOUNTER (EMERGENCY)
Facility: HOSPITAL | Age: 32
Discharge: HOME | End: 2017-02-23
Attending: EMERGENCY MEDICINE
Payer: MEDICAID

## 2017-02-23 VITALS
TEMPERATURE: 98 F | HEIGHT: 69 IN | RESPIRATION RATE: 20 BRPM | OXYGEN SATURATION: 100 % | HEART RATE: 63 BPM | BODY MASS INDEX: 37.03 KG/M2 | SYSTOLIC BLOOD PRESSURE: 133 MMHG | WEIGHT: 250 LBS | DIASTOLIC BLOOD PRESSURE: 84 MMHG

## 2017-02-23 DIAGNOSIS — G89.29 CHRONIC WRIST PAIN, RIGHT: ICD-10-CM

## 2017-02-23 DIAGNOSIS — M25.531 CHRONIC WRIST PAIN, RIGHT: ICD-10-CM

## 2017-02-23 DIAGNOSIS — R52 PAIN: ICD-10-CM

## 2017-02-23 DIAGNOSIS — M25.511 CHRONIC RIGHT SHOULDER PAIN: Primary | ICD-10-CM

## 2017-02-23 DIAGNOSIS — G89.29 CHRONIC RIGHT SHOULDER PAIN: Primary | ICD-10-CM

## 2017-02-23 DIAGNOSIS — M25.511 RIGHT SHOULDER PAIN: ICD-10-CM

## 2017-02-23 PROBLEM — Z76.5 DRUG-SEEKING BEHAVIOR: Status: ACTIVE | Noted: 2017-02-23

## 2017-02-23 LAB
B-HCG UR QL: NEGATIVE
CTP QC/QA: YES

## 2017-02-23 PROCEDURE — 63600175 PHARM REV CODE 636 W HCPCS: Performed by: PHYSICIAN ASSISTANT

## 2017-02-23 RX ORDER — KETOROLAC TROMETHAMINE 30 MG/ML
10 INJECTION, SOLUTION INTRAMUSCULAR; INTRAVENOUS
Status: COMPLETED | OUTPATIENT
Start: 2017-02-23 | End: 2017-02-23

## 2017-02-23 RX ORDER — MELOXICAM 7.5 MG/1
7.5 TABLET ORAL DAILY PRN
Qty: 20 TABLET | Refills: 0 | Status: SHIPPED | OUTPATIENT
Start: 2017-02-23 | End: 2017-09-05

## 2017-02-23 RX ADMIN — KETOROLAC TROMETHAMINE 10 MG: 30 INJECTION, SOLUTION INTRAMUSCULAR at 01:02

## 2017-02-23 NOTE — ED PROVIDER NOTES
Encounter Date: 2/22/2017       History     Chief Complaint   Patient presents with    Shoulder Pain     right shoulder pain; pt reports having a torn rotator cuff that at home medication is not helping     Review of patient's allergies indicates:   Allergen Reactions    Tramadol      Stomach upset and vomiting      HPI Comments: Patient is a 31-year-old female with history of anxiety and bipolar depression who presents to the emergency department with chronic wrist and shoulder pain.  Patient states over the last year she has had pain in the right shoulder and right wrist.  Patient states about a year ago she injured her shoulder, and she believes she has a rotator cuff injury.  Patient states she has never seen an orthopedic.  Patient states she saw her primary care doctor just a couple of days ago for the wrist pain.  He gave her a steroid Dosepak which has not seemed to help her pain.  Patient rates her pain 10 out of 10.  Patient denies any new injury.  Patient denies swelling, redness, or warmth of the joints.  Patient denies radiation of the pain.    The history is provided by the patient.     Past Medical History   Diagnosis Date    Anemia     Anxiety     Bipolar 1 disorder     Depression     Ear infection     GERD (gastroesophageal reflux disease)     History of psychiatric care     History of psychiatric hospitalization      [facility]     Opiate use     Psychiatric problem     S/P gastric bypass     Suicidal ideation     Therapy      Dr Rosa On 12/26/14, scheduled appointment     Past Medical History Pertinent Negatives   Diagnosis Date Noted    ADHD (attention deficit hyperactivity disorder) 1/1/2015    Behavioral problem 1/1/2015    Borderline personality disorder 1/1/2015    CHF (congestive heart failure) 1/1/2015    COPD (chronic obstructive pulmonary disease) 1/1/2015    CVA (cerebral vascular accident) 1/1/2015    Dementia 1/1/2015     Diabetes mellitus 2016    Fatigue 2015    Headache(784.0) 2015    HIV infection 2015    Hypertension 2016    Liver disease 2015    Neuropathy 2015    Obsessive-compulsive disorder 2015    Oppositional defiant disorder 2015    Psychiatric exam requested by authority 2015    PTSD (post-traumatic stress disorder) 2015    Renal dialysis status(V45.11) 2015    Renal disorder 2015    Schizoaffective disorder 2015    Seizures 2015    Self-harming behavior 2015    Suicide attempt 2015    Thyroid disease 2015     Past Surgical History   Procedure Laterality Date    Gastric bypass  2005     section, low transverse       x2    and 10/2016    Abdominal surgery       Exp lap to r/o bowel obstruction. Pt says surg was neg     Family History   Problem Relation Age of Onset    Diabetes Mother     Depression Mother     Depression Sister      Social History   Substance Use Topics    Smoking status: Former Smoker     Packs/day: 0.50     Years: 2.00     Types: Cigarettes     Quit date: 3/12/2016    Smokeless tobacco: None    Alcohol use No     Review of Systems   Constitutional: Negative for activity change, appetite change, chills, fatigue and fever.   HENT: Negative for congestion, ear discharge, ear pain, hearing loss, postnasal drip, rhinorrhea, sore throat and trouble swallowing.    Eyes: Negative for photophobia and visual disturbance.   Respiratory: Negative for cough and shortness of breath.    Cardiovascular: Negative for chest pain.   Gastrointestinal: Negative for abdominal pain, blood in stool, constipation, diarrhea, nausea and vomiting.   Genitourinary: Negative for dysuria, flank pain and hematuria.   Musculoskeletal: Negative for back pain, neck pain and neck stiffness.        Shoulder pain and wrist pain   Skin: Negative for rash and wound.   Neurological: Negative for dizziness, syncope, speech difficulty,  weakness, numbness and headaches.       Physical Exam   Initial Vitals   BP Pulse Resp Temp SpO2   02/22/17 2323 02/22/17 2323 02/22/17 2323 02/22/17 2323 02/22/17 2323   131/91 70 18 98.1 °F (36.7 °C) 100 %     Physical Exam    Nursing note and vitals reviewed.  Constitutional: She appears well-developed and well-nourished. She is not diaphoretic.  Non-toxic appearance. No distress.   HENT:   Head: Normocephalic.   Right Ear: Hearing and external ear normal.   Left Ear: Hearing and external ear normal.   Nose: Nose normal.   Mouth/Throat: Uvula is midline and oropharynx is clear and moist. No trismus in the jaw. No uvula swelling. No oropharyngeal exudate.   Eyes: Conjunctivae are normal. Pupils are equal, round, and reactive to light.   Neck: Normal range of motion.   Cardiovascular: Normal rate and regular rhythm.   Pulmonary/Chest: Breath sounds normal. No respiratory distress. She has no wheezes. She has no rhonchi. She has no rales. She exhibits no tenderness.   Abdominal: Soft. Bowel sounds are normal. She exhibits no distension and no mass. There is no tenderness. There is no rebound and no guarding.   Musculoskeletal:        Right shoulder: She exhibits normal range of motion, no bony tenderness, no swelling (no erythema), no crepitus, no deformity, normal pulse and normal strength.        Right wrist: She exhibits tenderness (with AROM and PROM). She exhibits normal range of motion, no swelling (no erythema) and no deformity.   Lymphadenopathy:     She has no cervical adenopathy.   Neurological: She is alert and oriented to person, place, and time.   Skin: Skin is warm and dry.   Psychiatric: She has a normal mood and affect.         ED Course   Procedures  Labs Reviewed   POCT URINE PREGNANCY          X-Rays:   Independently Interpreted Readings:   Other Readings:  No acute osseous findings    Imaging Results         X-Ray Wrist Complete Right (Final result) Result time:  02/23/17 00:20:35    Final result  by Reuben Bello MD (02/23/17 00:20:35)    Impression:        No acute displaced fracture or dislocation identified.      Electronically signed by: REUBEN BELLO MD, MD  Date:     02/23/17  Time:    00:20     Narrative:    COMPARISON: Right wrist series earlier same day    FINDINGS: 3 views of the right wrist.      No significant interval change.  Bones are well mineralized. Overall alignment is within normal limits.  No acute displaced fracture, dislocation, or destructive osseous process identified.  The joint spaces appear relatively maintained.   No subcutaneous emphysema or radiodense retained foreign body.            X-Ray Shoulder Complete 2 View Right (Final result) Result time:  02/23/17 00:07:17    Final result by Reuben Bello MD (02/23/17 00:07:17)    Impression:        No acute displaced fracture or dislocation identified.      Electronically signed by: REUBEN BELLO MD, MD  Date:     02/23/17  Time:    00:07     Narrative:    COMPARISON: Chest radiograph 10/8/15    FINDINGS: 3 views of the right shoulder.        Bones are well mineralized. Alignment is within normal limits.  No acute displaced fracture, dislocation, or destructive osseous process identified.  The joint spaces appear relatively maintained.   No subcutaneous emphysema or radiodense retained foreign body.              Medical Decision Making:   Initial Assessment:   Urgent evaluation of 31-year-old female with anxiety and bipolar depression who presents to the emergency department with chronic right shoulder and right wrist pain.  Patient is afebrile, nontoxic appearing, and hemodynamically stable.  No new injury.  No swelling, redness, warmth of joints.  No evidence of septic joint.  Imaging is obtained and shows no acute osseous findings in neither the wrist nor the shoulder.  Patient states ibuprofen is not helping her pain.  I explained to patient that I do not feel comfortable prescribing narcotics  for chronic pain.  She is  advised to establish care with ortho for MRI.  She is advised to return to ED with any worsening symptoms or concerns.  Other:   I have discussed this case with another health care provider.       <> Summary of the Discussion: Dr. Watkins              Attending Attestation:     Physician Attestation Statement for NP/PA:   I discussed this assessment and plan of this patient with the NP/PA, but I did not personally examine the patient. The face to face encounter was performed by the NP/PA.    Other NP/PA Attestation Additions:      Medical Decision Making: Agree with assessment and management.  Unremarkable radiographs of the wrist and shoulder.                 ED Course     Clinical Impression:   The primary encounter diagnosis was Chronic right shoulder pain. Diagnoses of Right shoulder pain, Pain, and Chronic wrist pain, right were also pertinent to this visit.          Mitzy Smith PA-C  02/23/17 0104       Chun Watkins MD  02/23/17 162

## 2017-02-23 NOTE — DISCHARGE INSTRUCTIONS
Do not take mobic with other anti-inflammatories like aleve, advil, ibuprofen, or motrin.  Arthralgia    Arthralgia is the term for pain in or around the joint. It is a symptom, not a disease. This pain may involve one or more joints. In some cases, the pain moves from joint to joint.  There are many causes for joint pain. These include:  · Injury  · Osteoarthritis (wearing out of the joint surface)  · Gout (inflammation of the joint due to crystals in the joint fluid)  · Infection inside the joint    · Bursitis (inflammation of the fluid-filled sacs around the joint)  · Autoimmune disorders such as rheumatoid arthritis or lupus  · Tendonitis (inflamation of chords that attach muscle to bone)  Home care  · Rest the involved joint(s) until your symptoms improve.   · You may be prescribed pain medication. If none is prescribed, you may use acetaminophen or ibuprofen to control pain and inflammation.  Follow up  Follow up with your healthcare provider or our staff as advised.  When to seek medical care  Contact your healthcare provider right away if any of the following occurs:  · Pain, swelling, or redness of joint increases  · Pain worsens or recurs after a period of improvement  · Pain moves to other joints  · You cannot bear weight on the affected joint   · You cannot move the affected joint  · Joint appears deformed  · New rash appears  · Fever of 101ºF (38.8ºC) or higher, or as directed by your healthcare provider  Date Last Reviewed: 4/26/2015  © 5731-1201 [employer]. [address] [place] [address], [place] [address]. All rights reserved. This information is not intended as a substitute for professional medical care. Always follow your healthcare professional's instructions.

## 2017-02-23 NOTE — ED AVS SNAPSHOT
[facility]  [address] [place] [address]               Darlin Silva   2017 12:32 AM   ED    Description:  Female : 1985   Department:  [facility]           Your Care was Coordinated By:     Provider Role From To    Chun Watkins MD Attending Provider 17 0033 --    Mitzy Smith PA-C Physician Assistant 17 --      Reason for Visit     Shoulder Pain           Diagnoses this Visit        Comments    Chronic right shoulder pain    -  Primary     Right shoulder pain         Pain         Chronic wrist pain, right           ED Disposition     None           To Do List            These Medications        Disp Refills Start End    meloxicam (MOBIC) 7.5 MG tablet 20 tablet 0 2017     Take 1 tablet (7.5 mg total) by mouth daily as needed for Pain. - Oral    Pharmacy: [facility]/pharmacy #[id number] [address] Ph #: [phone]         [facility]     [facility] -  Assistance  Registered nurses in the [facility] provide clinical advisement, health education, appointment booking, and other advisory services.  Call for this free service at [phone].             Medications           Message regarding Medications     Verify the changes and/or additions to your medication regime listed below are the same as discussed with your clinician today.  If any of these changes or additions are incorrect, please notify your healthcare provider.        START taking these NEW medications        Refills    meloxicam (MOBIC) 7.5 MG tablet 0    Sig: Take 1 tablet (7.5 mg total) by mouth daily as needed for Pain.    Class: Print    Route: Oral      These medications were administered today        Dose Freq    ketorolac injection 10 mg 10 mg ED 1 Time    Sig: Inject 10 mg into the muscle ED 1 Time.    Class: Normal    Route: Intramuscular      STOP taking  "these medications     methylPREDNISolone (MEDROL DOSEPACK) 4 mg tablet use as directed    oxycodone-acetaminophen (PERCOCET) 5-325 mg per tablet Take 1 tablet by mouth every 4 (four) hours as needed for Pain.           Verify that the below list of medications is an accurate representation of the medications you are currently taking.  If none reported, the list may be blank. If incorrect, please contact your healthcare provider. Carry this list with you in case of emergency.           Current Medications     alprazolam (XANAX) 1 MG tablet Take 1 mg by mouth 2 (two) times daily as needed.     escitalopram oxalate (LEXAPRO) 10 MG tablet Take 10 mg by mouth once daily.    zolpidem (AMBIEN) 10 mg Tab Take 10 mg by mouth every evening.    ibuprofen (ADVIL,MOTRIN) 800 MG tablet Take 1 tablet (800 mg total) by mouth every 8 (eight) hours as needed for Pain.    ketorolac injection 10 mg Inject 10 mg into the muscle ED 1 Time.    meloxicam (MOBIC) 7.5 MG tablet Take 1 tablet (7.5 mg total) by mouth daily as needed for Pain.           Clinical Reference Information           Your Vitals Were     BP Pulse Temp Resp Height Weight    131/91 (BP Location: Right arm, Patient Position: Sitting) 70 98.1 °F (36.7 °C) (Oral) 18 5' 9" (1.753 m) 113.4 kg (250 lb)    Last Period SpO2 BMI          02/05/2017 (Exact Date) 100% 36.92 kg/m2        Allergies as of 2/23/2017        Reactions    Tramadol     Stomach upset and vomiting       Immunizations Administered on Date of Encounter - 2/23/2017     None      ED Micro, Lab, POCT     Start Ordered       Status Ordering Provider    02/22/17 2357 02/22/17 2356  POCT urine pregnancy  Once      Final result       ED Imaging Orders     Start Ordered       Status Ordering Provider    02/22/17 2357 02/22/17 2356  X-Ray Wrist Complete Right  1 time imaging      Final result     02/22/17 2347 02/22/17 2346  X-Ray Shoulder Complete 2 View Right  1 time imaging      Final result         Discharge " Instructions         Do not take mobic with other anti-inflammatories like aleve, advil, ibuprofen, or motrin.  Arthralgia    Arthralgia is the term for pain in or around the joint. It is a symptom, not a disease. This pain may involve one or more joints. In some cases, the pain moves from joint to joint.  There are many causes for joint pain. These include:  · Injury  · Osteoarthritis (wearing out of the joint surface)  · Gout (inflammation of the joint due to crystals in the joint fluid)  · Infection inside the joint    · Bursitis (inflammation of the fluid-filled sacs around the joint)  · Autoimmune disorders such as rheumatoid arthritis or lupus  · Tendonitis (inflamation of chords that attach muscle to bone)  Home care  · Rest the involved joint(s) until your symptoms improve.   · You may be prescribed pain medication. If none is prescribed, you may use acetaminophen or ibuprofen to control pain and inflammation.  Follow up  Follow up with your healthcare provider or our staff as advised.  When to seek medical care  Contact your healthcare provider right away if any of the following occurs:  · Pain, swelling, or redness of joint increases  · Pain worsens or recurs after a period of improvement  · Pain moves to other joints  · You cannot bear weight on the affected joint   · You cannot move the affected joint  · Joint appears deformed  · New rash appears  · Fever of 101ºF (38.8ºC) or higher, or as directed by your healthcare provider  Date Last Reviewed: 4/26/2015  © 5988-2537 [employer] [address] [place] [address], [place] [address]. All rights reserved. This information is not intended as a substitute for professional medical care. Always follow your healthcare professional's instructions.          Smoking Cessation     If you would like to quit smoking:   You may be eligible for free services if you are a [place] resident and started smoking cigarettes before September 1, 1988.  Call the Smoking  [facility]) toll free at (182) [phone] or (048) 563-9107.   Call 1-800-QUIT-NOW if you do not meet the above criteria.             [facility] complies with applicable Federal civil rights laws and does not discriminate on the basis of race, color, national origin, age, disability, or sex.        Language Assistance Services     ATTENTION: Language assistance services are available, free of charge. Please call [phone].      ATENCIÓN: Si habla maria isabel, tiene a soto disposición servicios gratuitos de asistencia lingüística. Llame al [phone].     CHÚ Ý: N?u b?n nói Ti?ng Vi?t, có các d?ch v? h? tr? ngôn ng? mi?n phí dành cho b?n. G?i s? [phone].
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
## 2017-04-17 ENCOUNTER — OFFICE VISIT (OUTPATIENT)
Dept: INTERNAL MEDICINE | Facility: CLINIC | Age: 32
End: 2017-04-17
Attending: INTERNAL MEDICINE
Payer: MEDICAID

## 2017-04-17 VITALS
DIASTOLIC BLOOD PRESSURE: 78 MMHG | SYSTOLIC BLOOD PRESSURE: 122 MMHG | BODY MASS INDEX: 35.92 KG/M2 | TEMPERATURE: 99 F | HEART RATE: 73 BPM | OXYGEN SATURATION: 98 % | HEIGHT: 69 IN | WEIGHT: 242.5 LBS

## 2017-04-17 DIAGNOSIS — R10.2 PELVIC PAIN IN FEMALE: Primary | ICD-10-CM

## 2017-04-17 PROCEDURE — 99214 OFFICE O/P EST MOD 30 MIN: CPT | Mod: PBBFAC,PO | Performed by: INTERNAL MEDICINE

## 2017-04-17 PROCEDURE — 99999 PR PBB SHADOW E&M-EST. PATIENT-LVL IV: CPT | Mod: PBBFAC,,, | Performed by: INTERNAL MEDICINE

## 2017-04-17 PROCEDURE — 99203 OFFICE O/P NEW LOW 30 MIN: CPT | Mod: S$PBB,,, | Performed by: INTERNAL MEDICINE

## 2017-04-17 RX ORDER — OXYCODONE AND ACETAMINOPHEN 5; 325 MG/1; MG/1
1 TABLET ORAL 2 TIMES DAILY PRN
Qty: 20 TABLET | Refills: 0 | Status: SHIPPED | OUTPATIENT
Start: 2017-04-17 | End: 2017-04-27 | Stop reason: SDUPTHER

## 2017-04-17 NOTE — MR AVS SNAPSHOT
[facility]  [address]  [place] [address]  Phone: [phone]  Fax: [phone]                  Darlin Silva   2017 1:00 PM   Office Visit    Description:  Female : 1985   Provider:  Emile Medrano MD   Department:  [facility]           Reason for Visit     Pelvic Pain           Diagnoses this Visit        Comments    Pelvic pain in female    -  Primary            To Do List           Goals (5 Years of Data)     None      Follow-Up and Disposition     Return in about 9 days (around 2017).       These Medications        Disp Refills Start End    oxycodone-acetaminophen (PERCOCET) 5-325 mg per tablet 20 tablet 0 2017     Take 1 tablet by mouth 2 (two) times daily as needed for Pain. - Oral    Pharmacy: [facility]/pharmacy #[id number] - [address] #: [phone]         [facility] On Call     [facility] Care [facility] -  Assistance  Unless otherwise directed by your provider, please contact [facility], our nurse care line that is available for  assistance.     Registered nurses in the [facility] provide: appointment scheduling, clinical advisement, health education, and other advisory services.  Call: [phone] (toll free)               Medications           Message regarding Medications     Verify the changes and/or additions to your medication regime listed below are the same as discussed with your clinician today.  If any of these changes or additions are incorrect, please notify your healthcare provider.        START taking these NEW medications        Refills    oxycodone-acetaminophen (PERCOCET) 5-325 mg per tablet 0    Sig: Take 1 tablet by mouth 2 (two) times daily as needed for Pain.    Class: Print    Route: Oral           Verify that the below list of medications is an accurate representation of the medications you are currently taking.  If none reported, the  "list may be blank. If incorrect, please contact your healthcare provider. Carry this list with you in case of emergency.           Current Medications     alprazolam (XANAX) 1 MG tablet Take 1 mg by mouth 2 (two) times daily as needed.     escitalopram oxalate (LEXAPRO) 10 MG tablet Take 10 mg by mouth once daily.    ibuprofen (ADVIL,MOTRIN) 800 MG tablet Take 1 tablet (800 mg total) by mouth every 8 (eight) hours as needed for Pain.    meloxicam (MOBIC) 7.5 MG tablet Take 1 tablet (7.5 mg total) by mouth daily as needed for Pain.    oxycodone-acetaminophen (PERCOCET) 5-325 mg per tablet Take 1 tablet by mouth 2 (two) times daily as needed for Pain.    zolpidem (AMBIEN) 10 mg Tab Take 10 mg by mouth every evening.           Clinical Reference Information           Your Vitals Were     BP Pulse Temp Height Weight Last Period    122/78 (BP Location: Left arm, Patient Position: Sitting, BP Method: Manual) 73 98.5 °F (36.9 °C) 5' 9" (1.753 m) 110 kg (242 lb 8.1 oz) (Within Days)    SpO2 BMI             98% 35.81 kg/m2         Blood Pressure          Most Recent Value    BP  122/78      Allergies as of 4/17/2017     Tramadol      Immunizations Administered on Date of Encounter - 4/17/2017     None      Instructions    Keep the appointment next week for the ultrasound/OB/GYN, and you can follow-up with us here on Wednesday/Thursday. In the meantime, you can take the percocet as needed for pain. Feel free to call or message if anything changes.       Smoking Cessation     If you would like to quit smoking:   You may be eligible for free services if you are a [place] resident and started smoking cigarettes before September 1, 1988.  Call the Smoking [facility] ([facility]) toll free at ([phone]) [phone] or (510) [phone].   Call 1-800-QUIT-NOW if you do not meet the above criteria.   Contact us via email: [email or web address]   View our website for more information: [email or web address]        Language Assistance " Services     ATTENTION: Language assistance services are available, free of charge. Please call [phone].      ATENCIÓN: Si habla maria isabel, tiene a soto disposición servicios gratuitos de asistencia lingüística. Llame al [phone].     CHÚ Ý: N?u b?n nói Ti?ng Vi?t, có các d?ch v? h? tr? ngôn ng? mi?n phí dành cho b?n. G?i s? [phone].         [facility] complies with applicable Federal civil rights laws and does not discriminate on the basis of race, color, national origin, age, disability, or sex.

## 2017-04-17 NOTE — PATIENT INSTRUCTIONS
Keep the appointment next week for the ultrasound/OB/GYN, and you can follow-up with us here on Wednesday/Thursday. In the meantime, you can take the percocet as needed for pain. Feel free to call or message if anything changes.
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
## 2017-04-24 ENCOUNTER — OFFICE VISIT (OUTPATIENT)
Dept: INTERNAL MEDICINE | Facility: CLINIC | Age: 32
End: 2017-04-24
Attending: INTERNAL MEDICINE
Payer: MEDICAID

## 2017-04-24 VITALS
HEIGHT: 69 IN | HEART RATE: 78 BPM | DIASTOLIC BLOOD PRESSURE: 75 MMHG | BODY MASS INDEX: 35.33 KG/M2 | SYSTOLIC BLOOD PRESSURE: 118 MMHG | TEMPERATURE: 98 F | WEIGHT: 238.56 LBS

## 2017-04-24 DIAGNOSIS — K59.00 CONSTIPATION, UNSPECIFIED CONSTIPATION TYPE: ICD-10-CM

## 2017-04-24 DIAGNOSIS — M25.551 PELVIC JOINT PAIN, RIGHT: Primary | ICD-10-CM

## 2017-04-24 DIAGNOSIS — R19.5 HARD STOOL: ICD-10-CM

## 2017-04-24 PROCEDURE — 99999 PR PBB SHADOW E&M-EST. PATIENT-LVL III: CPT | Mod: PBBFAC,,, | Performed by: INTERNAL MEDICINE

## 2017-04-24 PROCEDURE — 99213 OFFICE O/P EST LOW 20 MIN: CPT | Mod: PBBFAC,PO | Performed by: INTERNAL MEDICINE

## 2017-04-24 PROCEDURE — 99213 OFFICE O/P EST LOW 20 MIN: CPT | Mod: S$PBB,,, | Performed by: INTERNAL MEDICINE

## 2017-04-24 RX ORDER — NORGESTIMATE AND ETHINYL ESTRADIOL 0.25-0.035
1 KIT ORAL DAILY
Refills: 11 | COMMUNITY
Start: 2017-03-10 | End: 2017-09-05

## 2017-04-24 RX ORDER — POLYETHYLENE GLYCOL 3350 17 G/17G
17 POWDER, FOR SOLUTION ORAL DAILY
Qty: 30 EACH | Refills: 11 | Status: SHIPPED | OUTPATIENT
Start: 2017-04-24 | End: 2017-09-05

## 2017-04-24 RX ORDER — CITALOPRAM 20 MG/1
20 TABLET, FILM COATED ORAL DAILY
COMMUNITY
End: 2018-06-15

## 2017-04-24 NOTE — PROGRESS NOTES
Subjective:       Patient ID: Darlin Silva is a 31 y.o. female.    Chief Complaint: ultrasound results    HPI Comments: Had US done today, told fluid in pelvis. Told her symptoms most likely colon by GYN provider that she saw. Her GM recent CRC death. Now anxious re possible colon issue.  Still having pelvic pain on R and back pain as well.    Black stool last week, two times. BMs yest was fine, not painful BM. No n/v. Sometimes tools are hard and she bleeds occasionally, but not recently.    Given pain meds from our clinic last week and claims to have 2.4 tabs left.    LMP 4/13, no bleeding since.    Review of Systems   Constitutional: Negative for chills and fever.   HENT: Negative for sore throat.    Eyes: Negative for visual disturbance.   Respiratory: Negative for cough and shortness of breath.    Cardiovascular: Negative for chest pain and palpitations.   Gastrointestinal: Positive for constipation (on occ). Negative for abdominal distention, blood in stool, diarrhea, nausea and vomiting.   Genitourinary: Positive for pelvic pain. Negative for dysuria, flank pain, urgency, vaginal bleeding and vaginal discharge.   Musculoskeletal: Negative for arthralgias.   Skin: Negative for rash.   Neurological: Negative for light-headedness.       Objective:      Physical Exam   Constitutional: She is oriented to person, place, and time. She appears well-developed and well-nourished. No distress.   HENT:   Head: Normocephalic and atraumatic.   Eyes: EOM are normal. Pupils are equal, round, and reactive to light.   No scleral pallor     Neck: Normal range of motion. Neck supple. No thyromegaly present.   Cardiovascular: Normal rate and regular rhythm.  Exam reveals no gallop and no friction rub.    No murmur heard.  Pulmonary/Chest: Effort normal and breath sounds normal. No respiratory distress. She has no wheezes. She has no rales.   Abdominal: Soft. Bowel sounds are normal. She exhibits no distension and no mass. There  is tenderness (right pelvic pain). There is no rebound and no guarding.   Musculoskeletal: Normal range of motion. She exhibits no edema or tenderness.   Lymphadenopathy:     She has no cervical adenopathy.   Neurological: She is alert and oriented to person, place, and time. No cranial nerve deficit.   Skin: Skin is warm and dry.   Psychiatric: She has a normal mood and affect. Her behavior is normal.   Vitals reviewed.      Assessment:       1. Hard stool    2. Constipation, unspecified constipation type        Plan:       Darlin was seen today for ultrasound results.    Diagnoses and all orders for this visit:    I still think her pain is pelvic in source -- was elicited with last week's GYN pelvic exam, is specifically in pelvic area, recently bowels have been OK, she has known endometriosis and claims to have had pelvic fluid in US. I will email with Dr. Briscoe.    Hard stool  -     polyethylene glycol (GLYCOLAX) 17 gram PwPk; Take 17 g by mouth once daily.  Constipation, unspecified constipation type  She has constipation on occasion and will try miralax prn.    Discussed that we could do one more round of pain med in 2-3 days if needed, and we discussed importance of watching out for opiate dependency, which she has had in past treated with suboxone.    F/u prn

## 2017-04-24 NOTE — Clinical Note
Hi Dr. Briscoe, I think her pelvic pain has a GYN source. I did not see the pelvic US report. She says it showed fluid in the pelvis, do you think this is from a gyn source? Thank you, Jeromy Harper

## 2017-04-24 NOTE — MR AVS SNAPSHOT
[facility]  [address]  [place] [address]  Phone: [phone]  Fax: [phone]                  Darlin Silva   2017 2:20 PM   Office Visit    Description:  Female : 1985   Provider:  Jeromy Harper MD   Department:  [facility]           Reason for Visit     ultrasound results           Diagnoses this Visit        Comments    Hard stool    -  Primary     Constipation, unspecified constipation type                To Do List           Goals (5 Years of Data)     None       These Medications        Disp Refills Start End    polyethylene glycol (GLYCOLAX) 17 gram PwPk 30 each 11 2017     Take 17 g by mouth once daily. - Oral    Pharmacy: [facility]/pharmacy #[id number] - [place], [place] - [address] #: [phone]         [facility]     [facility] -  Assistance  Unless otherwise directed by your provider, please contact [facility], our nurse care line that is available for  assistance.     Registered nurses in the [facility] provide: appointment scheduling, clinical advisement, health education, and other advisory services.  Call: [phone] (toll free)               Medications           Message regarding Medications     Verify the changes and/or additions to your medication regime listed below are the same as discussed with your clinician today.  If any of these changes or additions are incorrect, please notify your healthcare provider.        START taking these NEW medications        Refills    polyethylene glycol (GLYCOLAX) 17 gram PwPk 11    Sig: Take 17 g by mouth once daily.    Class: Normal    Route: Oral           Verify that the below list of medications is an accurate representation of the medications you are currently taking.  If none reported, the list may be blank. If incorrect, please contact your healthcare provider. Carry this list with you in case of  "emergency.           Current Medications     alprazolam (XANAX) 1 MG tablet Take 1 mg by mouth 2 (two) times daily as needed.     citalopram (CELEXA) 20 MG tablet Take 20 mg by mouth once daily.    escitalopram oxalate (LEXAPRO) 10 MG tablet Take 10 mg by mouth once daily.    ESTARYLLA 0.25-35 mg-mcg per tablet Take 1 tablet by mouth once daily.    ibuprofen (ADVIL,MOTRIN) 800 MG tablet Take 1 tablet (800 mg total) by mouth every 8 (eight) hours as needed for Pain.    meloxicam (MOBIC) 7.5 MG tablet Take 1 tablet (7.5 mg total) by mouth daily as needed for Pain.    oxycodone-acetaminophen (PERCOCET) 5-325 mg per tablet Take 1 tablet by mouth 2 (two) times daily as needed for Pain.    polyethylene glycol (GLYCOLAX) 17 gram PwPk Take 17 g by mouth once daily.    zolpidem (AMBIEN) 10 mg Tab Take 10 mg by mouth every evening.           Clinical Reference Information           Your Vitals Were     BP Pulse Temp Height Weight Last Period    118/75 (BP Location: Right arm, Patient Position: Sitting) 78 98 °F (36.7 °C) (Oral) 5' 9" (1.753 m) 108.2 kg (238 lb 8.6 oz) (Within Days)    BMI                35.23 kg/m2          Blood Pressure          Most Recent Value    BP  118/75      Allergies as of 4/24/2017     Tramadol      Immunizations Administered on Date of Encounter - 4/24/2017     None      Smoking Cessation     If you would like to quit smoking:   You may be eligible for free services if you are a [place] resident and started smoking cigarettes before September 1, 1988.  Call the Smoking Cessation [employer] (SCT) toll free at (719) 318-3349 or (610) 665-0364.   Call 1-800-QUIT-NOW if you do not meet the above criteria.   Contact us via email: [email or web address]   View our website for more information: [email or web address]        Language Assistance Services     ATTENTION: Language assistance services are available, free of charge. Please call [phone].      ATENCIÓN: Si florencio craven " disposición servicios gratuitos de asistencia lingüística. Jason al [phone].     SARIKA Ý: N?u b?n nói Ti?ng Vi?t, có các d?ch v? h? tr? ngôn ng? mi?n phí dành cho b?n. G?i s? [phone].         [facility] complies with applicable Federal civil rights laws and does not discriminate on the basis of race, color, national origin, age, disability, or sex.

## 2017-04-27 ENCOUNTER — TELEPHONE (OUTPATIENT)
Dept: INTERNAL MEDICINE | Facility: CLINIC | Age: 32
End: 2017-04-27

## 2017-04-27 RX ORDER — OXYCODONE AND ACETAMINOPHEN 5; 325 MG/1; MG/1
1 TABLET ORAL 2 TIMES DAILY PRN
Qty: 20 TABLET | Refills: 0 | Status: SHIPPED | OUTPATIENT
Start: 2017-04-27 | End: 2017-08-26

## 2017-04-27 NOTE — TELEPHONE ENCOUNTER
Patient came into office this morning. She was seen 4/24/17, states she was told during visit it was too early to have Percocet 5-325 mg refilled and that she could  a prescription on Thursday. Patient states that she is completely out of medication. Please advise.

## 2017-04-27 NOTE — TELEPHONE ENCOUNTER
Hi, I will send in one more rx but we cannot send in future refills. She needs to discuss with Dr. Arambula the best way to help treat her pelvic pain which imay be from endometriosis.  Let me know if patient has any questions.  Thank you, Jeromy Harper

## 2017-04-27 NOTE — TELEPHONE ENCOUNTER
----- Message from Zuri Alexander MA sent at 4/27/2017  1:14 PM CDT -----  Contact: self - [phone]   Please advise status of refill requesting for Percocet. Please call. Thanks!

## 2017-04-28 NOTE — TELEPHONE ENCOUNTER
Spoke with patient, informed of information below. Verbalized understanding. States she has a f/u appt with Dr. Arambula.

## 2017-06-19 ENCOUNTER — HOSPITAL ENCOUNTER (EMERGENCY)
Facility: HOSPITAL | Age: 32
Discharge: HOME | End: 2017-06-19
Attending: EMERGENCY MEDICINE
Payer: MEDICAID

## 2017-06-19 VITALS
RESPIRATION RATE: 18 BRPM | WEIGHT: 238 LBS | DIASTOLIC BLOOD PRESSURE: 73 MMHG | BODY MASS INDEX: 35.25 KG/M2 | SYSTOLIC BLOOD PRESSURE: 134 MMHG | HEART RATE: 73 BPM | TEMPERATURE: 99 F | OXYGEN SATURATION: 99 % | HEIGHT: 69 IN

## 2017-06-19 DIAGNOSIS — N76.0 BACTERIAL VAGINOSIS: ICD-10-CM

## 2017-06-19 DIAGNOSIS — B96.89 BACTERIAL VAGINOSIS: ICD-10-CM

## 2017-06-19 DIAGNOSIS — R10.31 RLQ ABDOMINAL PAIN: Primary | ICD-10-CM

## 2017-06-19 LAB
ALBUMIN SERPL BCP-MCNC: 3 G/DL
ALP SERPL-CCNC: 40 U/L
ALT SERPL W/O P-5'-P-CCNC: 10 U/L
AMPHET+METHAMPHET UR QL: NEGATIVE
ANION GAP SERPL CALC-SCNC: 6 MMOL/L
AST SERPL-CCNC: 18 U/L
B-HCG UR QL: NEGATIVE
BACTERIA GENITAL QL WET PREP: ABNORMAL
BARBITURATES UR QL SCN>200 NG/ML: NEGATIVE
BASOPHILS # BLD AUTO: 0.03 K/UL
BASOPHILS NFR BLD: 0.4 %
BENZODIAZ UR QL SCN>200 NG/ML: NORMAL
BILIRUB SERPL-MCNC: 0.2 MG/DL
BILIRUB UR QL STRIP: NEGATIVE
BUN SERPL-MCNC: 16 MG/DL
BZE UR QL SCN: NEGATIVE
CALCIUM SERPL-MCNC: 8.8 MG/DL
CANNABINOIDS UR QL SCN: NORMAL
CHLORIDE SERPL-SCNC: 111 MMOL/L
CLARITY UR: CLEAR
CLUE CELLS VAG QL WET PREP: ABNORMAL
CO2 SERPL-SCNC: 23 MMOL/L
COLOR UR: YELLOW
CREAT SERPL-MCNC: 0.7 MG/DL
CREAT UR-MCNC: 140.6 MG/DL
CTP QC/QA: YES
DIFFERENTIAL METHOD: ABNORMAL
EOSINOPHIL # BLD AUTO: 0.1 K/UL
EOSINOPHIL NFR BLD: 0.7 %
ERYTHROCYTE [DISTWIDTH] IN BLOOD BY AUTOMATED COUNT: 16.9 %
EST. GFR  (AFRICAN AMERICAN): >60 ML/MIN/1.73 M^2
EST. GFR  (NON AFRICAN AMERICAN): >60 ML/MIN/1.73 M^2
FILAMENT FUNGI VAG WET PREP-#/AREA: ABNORMAL
GLUCOSE SERPL-MCNC: 94 MG/DL
GLUCOSE UR QL STRIP: NEGATIVE
HCT VFR BLD AUTO: 36.6 %
HGB BLD-MCNC: 12.5 G/DL
HGB UR QL STRIP: NEGATIVE
KETONES UR QL STRIP: NEGATIVE
LEUKOCYTE ESTERASE UR QL STRIP: NEGATIVE
LYMPHOCYTES # BLD AUTO: 1.9 K/UL
LYMPHOCYTES NFR BLD: 25.6 %
MCH RBC QN AUTO: 26.8 PG
MCHC RBC AUTO-ENTMCNC: 34.2 %
MCV RBC AUTO: 78 FL
METHADONE UR QL SCN>300 NG/ML: NEGATIVE
MONOCYTES # BLD AUTO: 0.5 K/UL
MONOCYTES NFR BLD: 6.9 %
NEUTROPHILS # BLD AUTO: 4.8 K/UL
NEUTROPHILS NFR BLD: 66.3 %
NITRITE UR QL STRIP: NEGATIVE
OPIATES UR QL SCN: NORMAL
PCP UR QL SCN>25 NG/ML: NEGATIVE
PH UR STRIP: 6 [PH] (ref 5–8)
PLATELET # BLD AUTO: 363 K/UL
PMV BLD AUTO: 10.6 FL
POTASSIUM SERPL-SCNC: 4.2 MMOL/L
PROT SERPL-MCNC: 5.6 G/DL
PROT UR QL STRIP: NEGATIVE
RBC # BLD AUTO: 4.67 M/UL
SODIUM SERPL-SCNC: 140 MMOL/L
SP GR UR STRIP: 1.02 (ref 1–1.03)
SPECIMEN SOURCE: ABNORMAL
T VAGINALIS GENITAL QL WET PREP: ABNORMAL
TOXICOLOGY INFORMATION: NORMAL
URN SPEC COLLECT METH UR: NORMAL
UROBILINOGEN UR STRIP-ACNC: NEGATIVE EU/DL
WBC # BLD AUTO: 7.26 K/UL
WBC #/AREA VAG WET PREP: ABNORMAL
YEAST GENITAL QL WET PREP: ABNORMAL

## 2017-06-19 PROCEDURE — 85025 COMPLETE CBC W/AUTO DIFF WBC: CPT

## 2017-06-19 PROCEDURE — 80307 DRUG TEST PRSMV CHEM ANLYZR: CPT

## 2017-06-19 PROCEDURE — 63600175 PHARM REV CODE 636 W HCPCS: Performed by: PHYSICIAN ASSISTANT

## 2017-06-19 PROCEDURE — 80053 COMPREHEN METABOLIC PANEL: CPT

## 2017-06-19 PROCEDURE — 87210 SMEAR WET MOUNT SALINE/INK: CPT

## 2017-06-19 PROCEDURE — 81003 URINALYSIS AUTO W/O SCOPE: CPT

## 2017-06-19 PROCEDURE — 99285 EMERGENCY DEPT VISIT HI MDM: CPT | Mod: 25

## 2017-06-19 PROCEDURE — 81025 URINE PREGNANCY TEST: CPT | Performed by: EMERGENCY MEDICINE

## 2017-06-19 PROCEDURE — 87591 N.GONORRHOEAE DNA AMP PROB: CPT

## 2017-06-19 PROCEDURE — 96372 THER/PROPH/DIAG INJ SC/IM: CPT

## 2017-06-19 RX ORDER — KETOROLAC TROMETHAMINE 30 MG/ML
15 INJECTION, SOLUTION INTRAMUSCULAR; INTRAVENOUS
Status: COMPLETED | OUTPATIENT
Start: 2017-06-19 | End: 2017-06-19

## 2017-06-19 RX ORDER — METRONIDAZOLE 500 MG/1
500 TABLET ORAL EVERY 12 HOURS
Qty: 14 TABLET | Refills: 0 | Status: SHIPPED | OUTPATIENT
Start: 2017-06-19 | End: 2017-06-26

## 2017-06-19 RX ORDER — INDOMETHACIN 25 MG/1
25 CAPSULE ORAL
Qty: 21 CAPSULE | Refills: 0 | Status: SHIPPED | OUTPATIENT
Start: 2017-06-19 | End: 2017-06-26

## 2017-06-19 RX ADMIN — KETOROLAC TROMETHAMINE 15 MG: 30 INJECTION, SOLUTION INTRAMUSCULAR at 06:06

## 2017-06-19 NOTE — ED PROVIDER NOTES
"Encounter Date: 2017    SCRIBE #1 NOTE: I, Re[place] , am scribing for, and in the presence of,  PRISCILLA Pat. I have scribed the following portions of the note - Other sections scribed: HPI and ROS.       History     Chief Complaint   Patient presents with    Abdominal Pain     pt reports she has endometriosis and has been having abd pain for 3 weeks worse pain     Review of patient's allergies indicates:   Allergen Reactions    Tramadol      Stomach upset and vomiting      CC: Abdominal Pain     HPI: This 31 y.o F with GERD, anxiety, opiate use, Bipolar 1 disorder, anemia, endometriosis and PSHx of gastric bypass, cesrean section and Exp Lap to r/o bowel obstruction presents to the ED c/o acute onset of gradually worsening and severe (910) RLQ abdominal pain described as "throbbing" x3 weeks. Pt was last seen at [facility] ED for the same symptoms 17 and was Rx Hydrocodone with no relief. The pt notes this pain is not similar to her endometriosis pain, which is normally generalized lower abdominal pain nor her suspected bowel obstruction pain. Pt's pain is exacerbated with walking for long periods of time and palpations. Pt reports a seroma removal and  Section 10/2016 and an abdominal scar revision Sx 2017. Pt states her BM is softer than usual. Pt denies cough, heavy lifting, decreased appetite, N/V/D, constipations, dysuria, hematuria, urgency, fever, vaginal discharge, hx of ovarian cyst, ear pain and sore throat.     Pt has an appointment scheduled with her OBGYN 17.       The history is provided by the patient. No  was used.     Past Medical History:   Diagnosis Date    Anemia     Anxiety     Bipolar 1 disorder     Depression     Ear infection     GERD (gastroesophageal reflux disease)     History of psychiatric care     History of psychiatric hospitalization     [facility]     Opiate use     " "Psychiatric problem     S/P gastric bypass     Suicidal ideation     Therapy     Dr Rosa On 14, scheduled appointment     Past Surgical History:   Procedure Laterality Date    ABDOMINAL SURGERY      Exp lap to r/o bowel obstruction. Pt says surg was neg     SECTION, LOW TRANSVERSE      x2    and 10/2016    GASTRIC BYPASS  2005     Family History   Problem Relation Age of Onset    Diabetes Mother     Depression Mother     Depression Sister     Cancer Maternal Grandmother 80     CRC     Social History   Substance Use Topics    Smoking status: Light Tobacco Smoker     Packs/day: 0.50     Years: 2.00     Types: Cigarettes     Last attempt to quit: 3/12/2016    Smokeless tobacco: Not on file    Alcohol use No     Review of Systems   Constitutional: Negative for appetite change and fever.   HENT: Negative for ear pain and sore throat.    Respiratory: Negative for cough and shortness of breath.    Cardiovascular: Negative for chest pain.   Gastrointestinal: Positive for abdominal pain (RLQ, "throbbing"). Negative for constipation, diarrhea, nausea and vomiting.   Genitourinary: Negative for dysuria, frequency, hematuria and vaginal discharge.   Musculoskeletal: Negative for back pain.   Skin: Negative for rash.   Neurological: Negative for weakness.   Hematological: Does not bruise/bleed easily.       Physical Exam     Initial Vitals [17 1618]   BP Pulse Resp Temp SpO2   136/70 (!) 116 (!) 22 99.2 °F (37.3 °C) 99 %     Physical Exam    Nursing note and vitals reviewed.  Constitutional: She appears well-developed and well-nourished.   HENT:   Head: Normocephalic.   Right Ear: Hearing, tympanic membrane, external ear and ear canal normal.   Left Ear: Hearing, tympanic membrane, external ear and ear canal normal.   Nose: Nose normal.   Mouth/Throat: Oropharynx is clear and moist.   Eyes: Conjunctivae are normal.   Cardiovascular: Normal rate and regular rhythm. Exam reveals no gallop and " no friction rub.    No murmur heard.  Pulmonary/Chest: Breath sounds normal. She has no wheezes. She has no rhonchi. She has no rales.   Abdominal: Soft. Bowel sounds are normal. She exhibits no distension. There is tenderness in the right lower quadrant and suprapubic area. There is rebound and tenderness at McBurney's point. There is no rigidity, no guarding, no CVA tenderness and negative Jaramillo's sign.   Genitourinary:   Genitourinary Comments: Moderate amount of thick white discharge. R adnexal TTP. No CMT. No cervical friability.    Musculoskeletal: Normal range of motion.   Lymphadenopathy:     She has no cervical adenopathy.   Neurological: She is alert. She has normal strength. No cranial nerve deficit or sensory deficit.   Skin: Skin is warm and dry.   Psychiatric: She has a normal mood and affect.         ED Course   Procedures  Labs Reviewed   CBC W/ AUTO DIFFERENTIAL - Abnormal; Notable for the following:        Result Value    Hematocrit 36.6 (*)     MCV 78 (*)     MCH 26.8 (*)     RDW 16.9 (*)     Platelets 363 (*)     All other components within normal limits   COMPREHENSIVE METABOLIC PANEL - Abnormal; Notable for the following:     Chloride 111 (*)     Total Protein 5.6 (*)     Albumin 3.0 (*)     Alkaline Phosphatase 40 (*)     Anion Gap 6 (*)     All other components within normal limits   VAGINAL SCREEN - Abnormal; Notable for the following:     Clue Cells, Wet Prep Few (*)     WBC - Vaginal Screen Few (*)     Bacteria - Vaginal Screen Few (*)     All other components within normal limits   C. TRACHOMATIS/N. GONORRHOEAE BY AMP DNA   URINALYSIS   DRUG SCREEN PANEL, URINE EMERGENCY   POCT URINE PREGNANCY             Medical Decision Making:   Initial Assessment:   Patient is a 31-year-old female with history of endometriosis and drug seeking behavior presents for evaluation of 3 week history of constantsevere right lower quadrant pain.  Patient [facility] and was prescribed Norco with no relief. She denies nausea, vomiting, diarrhea, constipation or decreased appetite.  Patient is afebrile, well-appearing in acute distress.  On exam, there is moderate tenderness to palpation of the suprapubic and RLQ with peritoneal signs. Pelvic exam remarkable for moderate amount of thick white discharge.  No CMT. Moderate right adnexal TTP with no palpable masses. i doubt PID. Cbc EGATIVE.  Cbc AND cmp UNREMARKABLE.  vAGINAL SCREEN REMARKABLE FOR CLUE CELLS. UA negative for infection. Patient is given Toradol in the ED.  I considered appendicitis, endometriosis, ovarian torsion, fibroids, bowel obstruction as etiology of patient's pain. CT abdomen pelvis was ordered but pt left AMA. I discussed with pt the risks associated with leaving including worsening symptoms or death. She verbalized understanding and states she will return for CT. Pt left AMA with Flagyl for BV and indocin for symptomatic treatment.     I discussed this pt with Dr. Callahan who agrees with the assesment and plan.             Scribe Attestation:   Scribe #1: I performed the above scribed service and the documentation accurately describes the services I performed. I attest to the accuracy of the note.    Attending Attestation:           Physician Attestation for Scribe:  Physician Attestation Statement for Scribe #1: I, Candice Rodriguez PA-C, reviewed documentation, as scribed by Re[place] River in my presence, and it is both accurate and complete.                 ED Course     Clinical Impression:   The primary encounter diagnosis was RLQ abdominal pain. A diagnosis of Bacterial vaginosis was also pertinent to this visit.          Candice Rodriguez PA-C  06/20/17 2054

## 2017-06-19 NOTE — ED TRIAGE NOTES
Pt reports abd pain only to the Rt side with history of endometriosis.  Pt states that cramping and have a knot to the Rt side of lower abd.    Pt rates pain as 9.

## 2017-06-20 LAB
C TRACH DNA SPEC QL NAA+PROBE: NOT DETECTED
N GONORRHOEA DNA SPEC QL NAA+PROBE: NOT DETECTED

## 2017-06-20 NOTE — DISCHARGE INSTRUCTIONS
Follow up with your OBGYN for follow up.     Take full course of Flagyl for Bacterial Vaginosis. Do not drink alcohol on this medication.    You can take Indomethacin as prescribed for pain.     Please return to ER if you develop fever, worsening symptoms, nausea, vomiting, diarrhea or as needed.

## 2017-08-25 ENCOUNTER — HOSPITAL ENCOUNTER (EMERGENCY)
Facility: HOSPITAL | Age: 32
Discharge: HOME | End: 2017-08-26
Attending: EMERGENCY MEDICINE
Payer: MEDICAID

## 2017-08-25 DIAGNOSIS — K04.01 ACUTE PULPITIS: ICD-10-CM

## 2017-08-25 DIAGNOSIS — R10.2 FEMALE PELVIC PAIN: ICD-10-CM

## 2017-08-25 DIAGNOSIS — K08.89 PAIN, DENTAL: Primary | ICD-10-CM

## 2017-08-25 LAB
B-HCG UR QL: NEGATIVE
BACTERIA #/AREA URNS HPF: ABNORMAL /HPF
BILIRUB UR QL STRIP: NEGATIVE
CLARITY UR: CLEAR
COLOR UR: YELLOW
CTP QC/QA: YES
GLUCOSE UR QL STRIP: NEGATIVE
HGB UR QL STRIP: ABNORMAL
KETONES UR QL STRIP: NEGATIVE
LEUKOCYTE ESTERASE UR QL STRIP: NEGATIVE
MICROSCOPIC COMMENT: ABNORMAL
NITRITE UR QL STRIP: NEGATIVE
PH UR STRIP: 6 [PH] (ref 5–8)
PROT UR QL STRIP: NEGATIVE
RBC #/AREA URNS HPF: 40 /HPF (ref 0–4)
SP GR UR STRIP: 1.02 (ref 1–1.03)
URN SPEC COLLECT METH UR: ABNORMAL
UROBILINOGEN UR STRIP-ACNC: ABNORMAL EU/DL
WBC #/AREA URNS HPF: 1 /HPF (ref 0–5)

## 2017-08-25 PROCEDURE — 81025 URINE PREGNANCY TEST: CPT | Performed by: EMERGENCY MEDICINE

## 2017-08-25 PROCEDURE — 99284 EMERGENCY DEPT VISIT MOD MDM: CPT

## 2017-08-25 PROCEDURE — 81000 URINALYSIS NONAUTO W/SCOPE: CPT

## 2017-08-25 RX ORDER — IBUPROFEN 600 MG/1
600 TABLET ORAL
Status: DISCONTINUED | OUTPATIENT
Start: 2017-08-25 | End: 2017-08-25

## 2017-08-25 RX ORDER — OXYCODONE AND ACETAMINOPHEN 5; 325 MG/1; MG/1
1 TABLET ORAL
Status: COMPLETED | OUTPATIENT
Start: 2017-08-25 | End: 2017-08-26

## 2017-08-26 VITALS
WEIGHT: 238 LBS | SYSTOLIC BLOOD PRESSURE: 132 MMHG | HEIGHT: 69 IN | OXYGEN SATURATION: 99 % | HEART RATE: 72 BPM | DIASTOLIC BLOOD PRESSURE: 81 MMHG | RESPIRATION RATE: 16 BRPM | TEMPERATURE: 98 F | BODY MASS INDEX: 35.25 KG/M2

## 2017-08-26 PROCEDURE — 25000003 PHARM REV CODE 250: Performed by: EMERGENCY MEDICINE

## 2017-08-26 RX ORDER — PENICILLIN V POTASSIUM 500 MG/1
500 TABLET, FILM COATED ORAL 4 TIMES DAILY
Qty: 40 TABLET | Refills: 0 | Status: SHIPPED | OUTPATIENT
Start: 2017-08-26 | End: 2017-09-02

## 2017-08-26 RX ORDER — OXYCODONE AND ACETAMINOPHEN 5; 325 MG/1; MG/1
1 TABLET ORAL 2 TIMES DAILY PRN
Qty: 10 TABLET | Refills: 0 | Status: SHIPPED | OUTPATIENT
Start: 2017-08-26 | End: 2017-09-05

## 2017-08-26 RX ADMIN — OXYCODONE HYDROCHLORIDE AND ACETAMINOPHEN 1 TABLET: 5; 325 TABLET ORAL at 12:08

## 2017-08-26 NOTE — DISCHARGE INSTRUCTIONS
"Return to the Emergency Department of any acute worsening of your symptoms or for any other concern.     You should return to the ED for fever/chills, shortness of breath, chest pain, weakness or "passing out".     Pt should take all medications as prescribed.    Pt should follow up with PCP as soon as possible.    The risks associated with not taking your medications as prescribed and not following up with your Primary Care doctor or sub specialist includes worsening of your condition, pain, disability, loss of function or livelihood, and death      HIWOT Nelson M.D. 12:57 AM 8/26/2017      Our goal in the emergency department is to always give you outstanding care and exceptional service. You may receive a survey by mail or e-mail in the next week regarding your experience in our ED. We would greatly appreciate your completing and returning the survey. Your feedback provides us with a way to recognize our staff who give very good care and it helps us learn how to improve when your experience was below our aspiration of excellence.     "

## 2017-08-26 NOTE — ED PROVIDER NOTES
"Encounter Date: 2017    SCRIBE #1 NOTE: I, Petar Pagan, am scribing for, and in the presence of,  Rambo Nelson MD. I have scribed the following portions of the note - Other sections scribed: HPI, ROS.       History     Chief Complaint   Patient presents with    Groin Pain     "I've been having groin pain for about a week. I have an ovarian cyst. My teeth hurt too.    Dental Pain     CC: Pelvic Pain & Dental Pain    HPI: This 32 y.o. Female with PMHx anemia, bipolar 1 disorder, mounika, depression, hx psychiatric care, and PSHx gastric bypass, , and abdominal surgery presents to the ED c/o severe (10/10), "pressure and pulling" pelvic pain. Pt denies dysuria and vaginal discharge. Pt suspects her pain is coming from an ovarian cyst. Pt reports past hx ovarian cyst last March (x5 months ago) and denies having a f/u ultrasound. Pt reports hx endometriosis which worsens during her menstrual cycle.     Pt also reports upper and lower L quadrant dental pain "for a while" which worsened today.       The history is provided by the patient. No  was used.     Review of patient's allergies indicates:   Allergen Reactions    Tramadol      Stomach upset and vomiting      Past Medical History:   Diagnosis Date    Anemia     Anxiety     Bipolar 1 disorder     Depression     Ear infection     GERD (gastroesophageal reflux disease)     History of psychiatric care     History of psychiatric hospitalization     [facility]    Mounika     Opiate use     Psychiatric problem     S/P gastric bypass     Suicidal ideation     Therapy     Dr Rosa On 14, scheduled appointment     Past Surgical History:   Procedure Laterality Date    ABDOMINAL SURGERY      Exp lap to r/o bowel obstruction. Pt says surg was neg     SECTION, LOW TRANSVERSE      x2    and 10/2016    GASTRIC BYPASS       Family History   Problem Relation Age of Onset    " Diabetes Mother     Depression Mother     Depression Sister     Cancer Maternal Grandmother 80     CRC     Social History   Substance Use Topics    Smoking status: Light Tobacco Smoker     Packs/day: 0.50     Years: 2.00     Types: Cigarettes     Last attempt to quit: 3/12/2016    Smokeless tobacco: Current User    Alcohol use No     Review of Systems   Constitutional: Negative for fever.   HENT: Positive for dental problem (pain, upper & lower L quadrant). Negative for sore throat.    Respiratory: Negative for shortness of breath.    Cardiovascular: Negative for chest pain.   Gastrointestinal: Negative for nausea.   Genitourinary: Positive for pelvic pain. Negative for dysuria and vaginal discharge.   Musculoskeletal: Negative for back pain.   Skin: Negative for rash.   Neurological: Negative for weakness.   Hematological: Does not bruise/bleed easily.       Physical Exam     Initial Vitals [08/25/17 2216]   BP Pulse Resp Temp SpO2   (!) 145/92 (!) 113 20 97.9 °F (36.6 °C) 100 %      MAP       109.67         Physical Exam    Vitals reviewed.  Constitutional: She appears well-developed and well-nourished.   HENT:   Head: Normocephalic and atraumatic.   Nose: Nose normal.   Mouth/Throat: No trismus in the jaw. Abnormal dentition. Dental caries present. No dental abscesses or uvula swelling. No oropharyngeal exudate.       Eyes: EOM are normal. Pupils are equal, round, and reactive to light.   Neck: Normal range of motion. Neck supple. No JVD present.   Cardiovascular: Regular rhythm and normal heart sounds. Exam reveals no gallop and no friction rub.    No murmur heard.  Pulmonary/Chest: Breath sounds normal. No stridor. No respiratory distress. She has no wheezes. She has no rhonchi. She has no rales. She exhibits no tenderness.   Abdominal: Soft. Bowel sounds are normal. She exhibits no distension and no mass. There is tenderness in the suprapubic area. There is no rebound and no guarding.   Musculoskeletal:  Normal range of motion. She exhibits no edema or tenderness.   Neurological: She is alert and oriented to person, place, and time. She has normal strength. No sensory deficit.   Skin: Skin is warm and dry.   Psychiatric: She has a normal mood and affect. Thought content normal.         ED Course   Procedures  Labs Reviewed   URINALYSIS - Abnormal; Notable for the following:        Result Value    Occult Blood UA 2+ (*)     Urobilinogen, UA 2.0-3.0 (*)     All other components within normal limits   URINALYSIS MICROSCOPIC - Abnormal; Notable for the following:     RBC, UA 40 (*)     All other components within normal limits   POCT URINE PREGNANCY             Medical Decision Making:   Initial Assessment:   Medical decision-making:    The patient received a medical screening exam. If performed, the EKG was independently evaluated by me and is pending final cardiology evaluation.  If performed, all radiographic studies were independently evaluated by me and are pending final radiology evaluation. If labs were ordered, they were reviewed. Vital signs are independently assessed by me.  If performed, the pulse oximetry was independently evaluated by me.  I decided to obtain the patient's past medical record.  If available, I reviewed the patient's past medical record, including most recent labs and radiology reports.    ED Management:  Patient presents to the emergency department for evaluation.  2 complaints.  Complaint 1.  Dental pain.  Patient has acute pulpitis.  No evidence of trismus.  Guzman's angina or severe dental abscess or facial cellulitis requiring inpatient admission for IV antibiotics and treatment.  Patient can take pain medications and penicillin.  Follow-up with dentistry.  Problem #2 some suprapubic and right suprapubic pain.  Patient has a history of cysts.  Chest has a history of diagnosed endometriosis.  Patient has this chronic coming and going, pelvic pain.  This is similar to previous  presentations.  She is not on any birth control pills.  She denies any vaginal discharge.  She is not pregnant.  Ultrasound does not reveal any acute pathology.  No evidence of cyst or ovarian torsion or tubo-ovarian abscess.  Most likely, endometrial pain.  She has requested follow-up with Dr. Arambula with OB/GYN.  Patient's pain is extremely well controlled and she is no longer experiencing any symptoms at the time of discharge.  Her abdomen is soft and nontender and does not splay any peritoneal signs or signs of surgical abdomen at this time.  Discharge instructions were given and patient had no further questions.  I doubt acute appendicitis, mesenteric ischemia, aortic aneurysm or severe colitis.    The results and physical exam findings were reviewed with the patient. Pt agrees with assessment, disposition and treatment plan and has no further questions or complaints at this time.    HIWOT Nelson M.D. 1:18 AM 8/26/2017              Scribe Attestation:   Scribe #1: I performed the above scribed service and the documentation accurately describes the services I performed. I attest to the accuracy of the note.    Attending Attestation:           Physician Attestation for Scribe:  Physician Attestation Statement for Scribe #1: I, Rambo Nelson MD, reviewed documentation, as scribed by Petar Pagan in my presence, and it is both accurate and complete.                 ED Course     Clinical Impression:   The primary encounter diagnosis was Pain, dental. Diagnoses of Female pelvic pain and Acute pulpitis were also pertinent to this visit.                           Rambo Nelson MD  08/26/17 0119

## 2017-08-30 ENCOUNTER — HOSPITAL ENCOUNTER (EMERGENCY)
Facility: OTHER | Age: 32
Discharge: HOME | End: 2017-08-30
Attending: INTERNAL MEDICINE
Payer: MEDICAID

## 2017-08-30 VITALS
DIASTOLIC BLOOD PRESSURE: 80 MMHG | RESPIRATION RATE: 18 BRPM | HEART RATE: 86 BPM | SYSTOLIC BLOOD PRESSURE: 114 MMHG | OXYGEN SATURATION: 98 % | HEIGHT: 69 IN | TEMPERATURE: 98 F | WEIGHT: 230 LBS | BODY MASS INDEX: 34.07 KG/M2

## 2017-08-30 DIAGNOSIS — K08.89 TOOTHACHE: Primary | ICD-10-CM

## 2017-08-30 PROCEDURE — 99283 EMERGENCY DEPT VISIT LOW MDM: CPT

## 2017-09-01 PROBLEM — N80.9 ENDOMETRIOSIS, SITE UNSPECIFIED: Status: ACTIVE | Noted: 2017-09-01

## 2017-09-05 ENCOUNTER — HOSPITAL ENCOUNTER (EMERGENCY)
Facility: HOSPITAL | Age: 32
Discharge: HOME | End: 2017-09-05
Attending: EMERGENCY MEDICINE
Payer: MEDICAID

## 2017-09-05 VITALS
OXYGEN SATURATION: 99 % | BODY MASS INDEX: 34.07 KG/M2 | SYSTOLIC BLOOD PRESSURE: 127 MMHG | HEIGHT: 69 IN | DIASTOLIC BLOOD PRESSURE: 62 MMHG | RESPIRATION RATE: 16 BRPM | TEMPERATURE: 98 F | WEIGHT: 230 LBS | HEART RATE: 69 BPM

## 2017-09-05 DIAGNOSIS — K08.89 PAIN, DENTAL: Primary | ICD-10-CM

## 2017-09-05 LAB
B-HCG UR QL: NEGATIVE
CTP QC/QA: YES

## 2017-09-05 PROCEDURE — 81025 URINE PREGNANCY TEST: CPT | Performed by: PHYSICIAN ASSISTANT

## 2017-09-05 PROCEDURE — 99284 EMERGENCY DEPT VISIT MOD MDM: CPT | Mod: 25

## 2017-09-05 RX ORDER — CYCLOBENZAPRINE HCL 10 MG
10 TABLET ORAL NIGHTLY
Qty: 15 TABLET | Refills: 0 | Status: SHIPPED | OUTPATIENT
Start: 2017-09-05 | End: 2017-09-10

## 2017-09-05 RX ORDER — OXYCODONE AND ACETAMINOPHEN 5; 325 MG/1; MG/1
1 TABLET ORAL EVERY 4 HOURS PRN
Qty: 12 TABLET | Refills: 0 | Status: SHIPPED | OUTPATIENT
Start: 2017-09-05 | End: 2017-09-23 | Stop reason: SDUPTHER

## 2017-09-05 RX ORDER — OXYCODONE AND ACETAMINOPHEN 10; 325 MG/1; MG/1
1 TABLET ORAL 2 TIMES DAILY PRN
Refills: 0 | COMMUNITY
Start: 2017-08-26 | End: 2017-09-05

## 2017-09-05 RX ORDER — CHLORHEXIDINE GLUCONATE ORAL RINSE 1.2 MG/ML
15 SOLUTION DENTAL 2 TIMES DAILY
Qty: 120 ML | Refills: 0 | Status: SHIPPED | OUTPATIENT
Start: 2017-09-05 | End: 2017-09-19

## 2017-09-05 NOTE — ED TRIAGE NOTES
"Pt with c/o pain to her  front upper and lower tooth.  Pt states, "I went to the dentist on Aug. 28th and did x-rays, I need to have the top one filled, and the lower one I needed to have it capped or pulled."  Pt reports pain has worsened over the past two days.   "

## 2017-09-05 NOTE — DISCHARGE INSTRUCTIONS
Take medications as prescribed.  Continue to schedule appointment with your dentist.  Return to this ED if any problems occur.

## 2017-09-07 ENCOUNTER — OFFICE VISIT (OUTPATIENT)
Dept: URGENT CARE | Facility: CLINIC | Age: 32
End: 2017-09-07
Payer: MEDICAID

## 2017-09-07 VITALS
DIASTOLIC BLOOD PRESSURE: 79 MMHG | WEIGHT: 230 LBS | BODY MASS INDEX: 34.07 KG/M2 | TEMPERATURE: 98 F | SYSTOLIC BLOOD PRESSURE: 121 MMHG | OXYGEN SATURATION: 99 % | HEIGHT: 69 IN | HEART RATE: 70 BPM

## 2017-09-07 DIAGNOSIS — K04.7 DENTAL ABSCESS: Primary | ICD-10-CM

## 2017-09-07 PROCEDURE — 99203 OFFICE O/P NEW LOW 30 MIN: CPT | Mod: S$GLB,,, | Performed by: SURGERY

## 2017-09-07 PROCEDURE — 3008F BODY MASS INDEX DOCD: CPT | Mod: S$GLB,,, | Performed by: SURGERY

## 2017-09-07 RX ORDER — AMOXICILLIN 875 MG/1
875 TABLET, FILM COATED ORAL 2 TIMES DAILY
Qty: 20 TABLET | Refills: 0 | Status: SHIPPED | OUTPATIENT
Start: 2017-09-07 | End: 2017-09-17

## 2017-09-07 RX ORDER — IBUPROFEN 800 MG/1
800 TABLET ORAL EVERY 8 HOURS PRN
Qty: 60 TABLET | Refills: 2 | Status: SHIPPED | OUTPATIENT
Start: 2017-09-07 | End: 2017-09-23 | Stop reason: SDUPTHER

## 2017-09-07 NOTE — PATIENT INSTRUCTIONS
"  Dental Abscess    A dental abscess is an infection of the tooth socket. It often starts with a crack or cavity in the tooth. A pocket of pus forms between the tooth and the bone. The infection causes pain and swelling of the gum, cheek, or jaw. The pain is often made worse by drinking hot or cold fluids, or biting on hard foods. Pain may be felt in the facial sinus or in the ear. A severe infection can interfere with swallowing and breathing.  Causes  · Cavities  · Trauma  · Previous dental work  Symptoms  · Pain  · Swelling around the tooth or face and cheek  · Redness  · Bad breath  · Bad taste in the mouth  · Fever  You will be started on an antibiotic. However, final treatment requires drainage of the pus. This can be done by removing the tooth or performing a root canal. A root canal is done by an oral surgeon. It involves drilling an opening in the tooth to drain the pus. After the infection has healed, a crown is placed over the tooth.  Home care  The following guidelines will help you care for your abscess at home:  · Avoid hot and cold foods and liquids, since your tooth may be sensitive to temperature changes.  · If your tooth is chipped or cracked, or if there is a large open cavity, apply oil of cloves (available over-the-counter in drug stores) directly to the tooth to reduce pain. Some drugstores carry an over-the-counter "toothache kit." This contains oil of cloves and a paste, which can be applied over the exposed tooth to decrease sensitivity.  · Apply an ice pack (ice cubes in a plastic bag, wrapped in a towel) over the injured area for 10 to 20 minutes every 1 to 2 hours the first day for pain relief. Continue this 3 to 4 times a day until the pain and swelling goes away.  · You can take acetaminophen or ibuprofen for pain, unless you were given a different pain medicine to use. (Note: If you have chronic liver or kidney disease, have ever had a stomach ulcer or gastrointestinal bleeding, or are " taking blood-thinning medicines, talk with your healthcare provider before using these medicines.)  · An antibiotic will be prescribed. Take it as directed until completed, even if you are feeling better sooner.  Follow-up care  Follow up as advised with a dentist or oral surgeon. Even though your pain may improve with the treatment given today, only a dentist or oral surgeon can provide full treatment for this problem.  · If a culture was done, you will be notified if the treatment needs to be changed. You can call in as directed for the results.  · If X-rays were taken, they will be reviewed by a specialist. You will be notified of the results, especially if they affect treatment.  Call 911  Call emergency services right away if any of these occur:  · Trouble breathing or swallowing, or wheezing  · Hoarse voice or trouble speaking  · Confusion  · Extreme drowsiness or trouble awakening  · Fainting or loss of consciousness  · Rapid heart rate  When to seek medical advice  Call your healthcare provider right away if any of these occur:  · Your face or eyelid becomes swollen or red.  · Pain worsens or spreads to the neck.  · You develop a fever of 100.4ºF (38ºC) or higher.  · You have unusual drowsiness, a headache or stiff neck, or weakness.  · Pus drains from the gum or tooth.  · You are unable to open your mouth wide.  Date Last Reviewed: 7/30/2015  © 8567-0904 [employer]. [address] [place] [address], [place] [address]. All rights reserved. This information is not intended as a substitute for professional medical care. Always follow your healthcare professional's instructions.

## 2017-09-07 NOTE — PROGRESS NOTES
"Subjective:       Patient ID: Darlin Silva is a 32 y.o. female.    Vitals:  height is 5' 9" (1.753 m) and weight is 104.3 kg (230 lb). Her oral temperature is 98.2 °F (36.8 °C). Her blood pressure is 121/79 and her pulse is 70. Her oxygen saturation is 99%.     Chief Complaint: Dental Pain    Pt states she has teeth that need to be pulled that she can not afford to have done. Pt was seen in the ER 2 days ago and given 12 percocet. She states that they did not give her any pain meds because she had some leftover from her dentist. Pt has extensive history of drug abuse.       Dental Pain    This is a new problem. The current episode started more than 1 month ago. The problem occurs constantly. The problem has been gradually worsening. The pain is at a severity of 6/10. The pain is moderate. Pertinent negatives include no fever.     Review of Systems   Constitution: Negative for chills and fever.   HENT: Negative for sore throat.    Eyes: Negative for blurred vision.   Cardiovascular: Negative for chest pain.   Respiratory: Negative for shortness of breath.    Skin: Negative for rash.   Musculoskeletal: Negative for back pain and joint pain.   Gastrointestinal: Negative for abdominal pain, diarrhea, nausea and vomiting.   Neurological: Negative for headaches.   Psychiatric/Behavioral: The patient is not nervous/anxious.        Objective:      Physical Exam   HENT:   Mouth/Throat: Abnormal dentition. Dental abscesses and dental caries present.           Assessment:       1. Dental abscess        Plan:         Dental abscess  -     amoxicillin (AMOXIL) 875 MG tablet; Take 1 tablet (875 mg total) by mouth 2 (two) times daily.  Dispense: 20 tablet; Refill: 0  -     ibuprofen (ADVIL,MOTRIN) 800 MG tablet; Take 1 tablet (800 mg total) by mouth every 8 (eight) hours as needed for Pain.  Dispense: 60 tablet; Refill: 2          Pt requested narcotic pain medication but review of last visits reveal narcotic rx recently. I " recommended longer course of antibiotics pendig dental visit and ibuprofen as needed.

## 2017-09-08 NOTE — ED PROVIDER NOTES
Encounter Date: 2017       History     Chief Complaint   Patient presents with    Dental Pain     left upper and bottom pain to gums, onset two weeks      32-year-old female presents to the emergency department with left upper tooth pain ×2 days.      The history is provided by the patient. No  was used.   Dental Pain   The primary symptoms include mouth pain. The symptoms began yesterday. The symptoms are unchanged. The symptoms are new. The symptoms occur frequently.   Mouth pain began 24 -48 hours ago. Mouth pain occurs intermittently. Affected locations include: teeth. At its highest the mouth pain was at 6/10.   Additional symptoms include: dental sensitivity to temperature.     Review of patient's allergies indicates:   Allergen Reactions    Tramadol      Stomach upset and vomiting      Past Medical History:   Diagnosis Date    Anemia     Anxiety     Bipolar 1 disorder     Depression     Ear infection     Endometriosis     GERD (gastroesophageal reflux disease)     History of psychiatric care     History of psychiatric hospitalization     [facility]     Opiate use     Psychiatric problem     S/P gastric bypass     Suicidal ideation     Therapy     Dr Rosa On 14, scheduled appointment     Past Surgical History:   Procedure Laterality Date    ABDOMINAL SURGERY      Exp lap to r/o bowel obstruction. Pt says surg was neg     SECTION, LOW TRANSVERSE      x2    and 10/2016    DILATION AND CURETTAGE OF UTERUS      GASTRIC BYPASS  2005     Family History   Problem Relation Age of Onset    Diabetes Mother     Depression Mother     Depression Sister     Cancer Maternal Grandmother 80     CRC     Social History   Substance Use Topics    Smoking status: Light Tobacco Smoker     Packs/day: 0.50     Years: 2.00     Types: Cigarettes     Last attempt to quit: 3/12/2016    Smokeless tobacco: Current User    Alcohol  use No     Review of Systems   HENT: Positive for dental problem.    Respiratory: Negative.    Cardiovascular: Negative.    All other systems reviewed and are negative.      Physical Exam     Initial Vitals [08/30/17 1600]   BP Pulse Resp Temp SpO2   114/80 86 18 98.3 °F (36.8 °C) 98 %      MAP       91.33         Physical Exam    Nursing note and vitals reviewed.  Constitutional: She appears well-developed and well-nourished.   HENT:   Head: Normocephalic and atraumatic.   Left upper dental caries   Eyes: EOM are normal.   Neck: Normal range of motion.   Cardiovascular: Normal rate and regular rhythm.   Pulmonary/Chest: Breath sounds normal. No respiratory distress.   Abdominal: She exhibits no distension.   Musculoskeletal: Normal range of motion.   Neurological: She is alert.   Skin: Skin is warm.   Psychiatric: She has a normal mood and affect.         ED Course   Procedures  Labs Reviewed - No data to display          Medical Decision Making:   Initial Assessment:   32-year-old female presents to the emergency department with left upper tooth pain ×2 days.  Differential Diagnosis:   Toothache  To the abscess  ED Management:  Patient was given instructions for toothache.  She states she has ibuprofen and amoxicillin at home and is taking it as prescribed.  She was advised to follow-up with her dentist as scheduled.                   ED Course      Clinical Impression:   The encounter diagnosis was Toothache.    Disposition:   Disposition: Discharged  Condition: Stable                        Tigre Stephens MD  09/08/17 0801

## 2017-09-15 ENCOUNTER — HOSPITAL ENCOUNTER (EMERGENCY)
Facility: HOSPITAL | Age: 32
Discharge: HOME | End: 2017-09-15
Attending: EMERGENCY MEDICINE
Payer: MEDICAID

## 2017-09-15 VITALS
HEART RATE: 76 BPM | OXYGEN SATURATION: 100 % | DIASTOLIC BLOOD PRESSURE: 63 MMHG | WEIGHT: 230 LBS | RESPIRATION RATE: 16 BRPM | BODY MASS INDEX: 34.07 KG/M2 | HEIGHT: 69 IN | SYSTOLIC BLOOD PRESSURE: 134 MMHG | TEMPERATURE: 99 F

## 2017-09-15 DIAGNOSIS — K08.89 PAIN, DENTAL: Primary | ICD-10-CM

## 2017-09-15 PROCEDURE — 99283 EMERGENCY DEPT VISIT LOW MDM: CPT

## 2017-09-15 RX ORDER — OXYCODONE AND ACETAMINOPHEN 10; 325 MG/1; MG/1
1 TABLET ORAL EVERY 4 HOURS PRN
Qty: 12 TABLET | Refills: 0 | Status: SHIPPED | OUTPATIENT
Start: 2017-09-15 | End: 2017-09-23

## 2017-09-15 RX ORDER — AMOXICILLIN AND CLAVULANATE POTASSIUM 875; 125 MG/1; MG/1
1 TABLET, FILM COATED ORAL 2 TIMES DAILY
Qty: 20 TABLET | Refills: 0 | Status: SHIPPED | OUTPATIENT
Start: 2017-09-15 | End: 2017-09-23

## 2017-09-15 NOTE — ED PROVIDER NOTES
"Encounter Date: 9/15/2017    SCRIBE #1 NOTE: I, Janis Shelby, am scribing for, and in the presence of,  Susie Soriano NP. I have scribed the following portions of the note - Other sections scribed: HPI and ROS.       History     Chief Complaint   Patient presents with    Dental Pain     x 1 month. " I have some rotten teeth. It hurts all over. I have an apt on Oct 21 at [facility]."      Chief Complaint: Dental Pain    HPI: This 32 y.o. Female with GERD, anxiety, opiate abuse, suicidal ideations, hx of psychiatric hospitalization, psychiatric care, depression, bipolar 1 disorder, marisa, anemia, endometriosis and gastric bypass surgery presents to the ED c/o frontal upper and lower dental pain. Symptoms have been ongoing "for a long time". Patient attributes symptoms to 2 cavities to either tooth. Patient asencio an upcoming appointment at [facility] for next month, October, a long time ago. However, she reports calling to see if any cancellations occurred to be evaluated sooner secondary to symptoms. Pain is constant, severe and worsening. Due to late appointment, she decided to take out a loan and be seen by a private dentist. She was evaluated and received an x ray and a teeth cleaning. However, facility told her she would have to make another appointment to receive dental work and prescribed her antibiotics for treatment. Patient states she has completed the 1 week antibiotic regimen without symptom improvement. She also denies pain relief with Ibuprofen. At this time, she denies fever, chills, trouble swallowing, choking and facial swelling.      The history is provided by the patient. No  was used.     Review of patient's allergies indicates:   Allergen Reactions    Tramadol      Stomach upset and vomiting      Past Medical History:   Diagnosis Date    Anemia     Anxiety     Bipolar 1 disorder     Depression     Ear infection     Endometriosis     GERD (gastroesophageal reflux disease)     " History of psychiatric care     History of psychiatric hospitalization     [facility]     Opiate use     Psychiatric problem     S/P gastric bypass     Suicidal ideation     Therapy     Dr Rosa On 14, scheduled appointment     Past Surgical History:   Procedure Laterality Date    ABDOMINAL SURGERY      Exp lap to r/o bowel obstruction. Pt says surg was neg     SECTION, LOW TRANSVERSE      x2    and 10/2016    DILATION AND CURETTAGE OF UTERUS      GASTRIC BYPASS       Family History   Problem Relation Age of Onset    Diabetes Mother     Depression Mother     Depression Sister     Cancer Maternal Grandmother 80     CRC     Social History   Substance Use Topics    Smoking status: Light Tobacco Smoker     Packs/day: 0.50     Years: 2.00     Types: Cigarettes     Last attempt to quit: 3/12/2016    Smokeless tobacco: Current User    Alcohol use No     Review of Systems   Constitutional: Negative for chills and fever.   HENT: Positive for dental problem. Negative for ear pain, facial swelling, sore throat and trouble swallowing.    Eyes: Negative for pain.   Respiratory: Negative for cough and choking.    Gastrointestinal: Negative for nausea and vomiting.   Musculoskeletal: Negative for myalgias (arm or leg pain).   Skin: Negative for rash.   Neurological: Negative for headaches.       Physical Exam     Initial Vitals [09/15/17 0900]   BP Pulse Resp Temp SpO2   134/63 76 16 98.7 °F (37.1 °C) 100 %      MAP       86.67         Physical Exam    Nursing note and vitals reviewed.  Constitutional: Vital signs are normal. She appears well-developed and well-nourished. She is not diaphoretic. She is active and cooperative. She does not appear ill. No distress.   HENT:   Mouth/Throat: Uvula is midline, oropharynx is clear and moist and mucous membranes are normal. There is trismus in the jaw. Uvula swelling and dental caries (#10 and #23) present.        No sublingual or facial swelling. No trismus, drooling.   Neurological: She is alert and oriented to person, place, and time.         ED Course   Procedures  Labs Reviewed - No data to display             Additional MDM:   Comments: This is an urgent evaluation of a 32-year-old female that presents the emergency room complaining of dental pain.  Patient reports chronic dental caries that she has been attempting to address for the last several months.  She paid out of pocket for a private dentist, but did not have the caries fixed.  She is currently awaiting [facility], but appointment is not until next month.  Exam today is benign, with no evidence of Guzman's, facial cellulitis, airway compromise, fluctuant abscess, or systemic infection.  There is a possibly an underlying periapical abscess/odontogenic infection, but no indication for workup or intervention today.  Rx Abx course and pain control.  To f/u with dental as soon as possible.  Return precautions.  Case discussed with attending, , and he is in agreement with plan. Stable for discharge and outpatient follow.  .          Scribe Attestation:   Scribe #1: I performed the above scribed service and the documentation accurately describes the services I performed. I attest to the accuracy of the note.    Attending Attestation:           Physician Attestation for Scribe:  Physician Attestation Statement for Scribe #1: I, Susie Crespo. ARUN, reviewed documentation, as scribed by Janis Shelby in my presence, and it is both accurate and complete.                 ED Course      Clinical Impression:   The encounter diagnosis was Pain, dental.    Disposition:   Disposition: Discharged  Condition: Stable                        Susie Crespo NP  09/15/17 7689

## 2017-09-15 NOTE — ED TRIAGE NOTES
C/o shocking dental pain radiating to lt ear.  C/O lt. Upper and lower jaw pain x 1 month. C/O HA. Reports  Dental appt. 10/21.  Reports last  Dose of abx  Taken last week.

## 2017-09-15 NOTE — DISCHARGE INSTRUCTIONS
It appears you have a dental infection.    Please take PENICILLIN for the infection for 10 days.    Take IBUPROFEN for pain/inflammation.  If you still have pain, take PERCOCET for breakthrough pain.  Please do not take PERCOCET while working, drinking alcohol, driving/operating heavy machinery, swimming. It may cause drowsiness, impair judgment, and reduce physical capabilities.    Please follow up with a dentist within 1 week. Call for an appointment as soon as possible.  If you do not have a dentist, refer to the dental resources page for nearby clinics.    Return to the ER for any new or worsening symptoms or concerns.

## 2017-09-23 ENCOUNTER — HOSPITAL ENCOUNTER (EMERGENCY)
Facility: OTHER | Age: 32
Discharge: HOME | End: 2017-09-23
Attending: EMERGENCY MEDICINE
Payer: MEDICAID

## 2017-09-23 VITALS
TEMPERATURE: 99 F | WEIGHT: 230 LBS | HEIGHT: 69 IN | BODY MASS INDEX: 34.07 KG/M2 | SYSTOLIC BLOOD PRESSURE: 126 MMHG | OXYGEN SATURATION: 100 % | RESPIRATION RATE: 18 BRPM | DIASTOLIC BLOOD PRESSURE: 81 MMHG | HEART RATE: 93 BPM

## 2017-09-23 DIAGNOSIS — K02.9 INFECTED DENTAL CARIES: Primary | ICD-10-CM

## 2017-09-23 DIAGNOSIS — K04.7 INFECTED DENTAL CARIES: Primary | ICD-10-CM

## 2017-09-23 PROCEDURE — 99283 EMERGENCY DEPT VISIT LOW MDM: CPT

## 2017-09-23 RX ORDER — IBUPROFEN 800 MG/1
800 TABLET ORAL EVERY 8 HOURS PRN
Qty: 40 TABLET | Refills: 1 | Status: SHIPPED | OUTPATIENT
Start: 2017-09-23 | End: 2017-09-27 | Stop reason: ALTCHOICE

## 2017-09-23 RX ORDER — OXYCODONE AND ACETAMINOPHEN 10; 325 MG/1; MG/1
1 TABLET ORAL EVERY 4 HOURS PRN
Qty: 12 TABLET | Refills: 0 | Status: SHIPPED | OUTPATIENT
Start: 2017-09-23 | End: 2017-09-27

## 2017-09-23 RX ORDER — AMOXICILLIN AND CLAVULANATE POTASSIUM 875; 125 MG/1; MG/1
1 TABLET, FILM COATED ORAL 2 TIMES DAILY
Qty: 20 TABLET | Refills: 0 | Status: SHIPPED | OUTPATIENT
Start: 2017-09-23 | End: 2017-09-27 | Stop reason: ALTCHOICE

## 2017-09-23 NOTE — ED NOTES
Pt reports dental pain from 2 caries for a couple of months, reports next appt at [facility] end of October, Percocet helps with pain and ran out

## 2017-09-23 NOTE — ED PROVIDER NOTES
"Encounter Date: 2017    SCRIBE #1 NOTE: I, Pattie Wagner, am scribing for, and in the presence of,  . I have scribed the entire note.       History     Chief Complaint   Patient presents with    Dental Pain     "I have some rotten teeth and I don't know if they are infected or not but I'm just in a lot of pain. I'm having a headache and it's making my ears hurt."     Time seen by provider: 9:34 AM     This is a 32 y.o. female who presents with dental pain x 10 days. Pt states having symptom for "a few weeks" but pain worsened the last 10 days. She describes her pain as severe in quality; rated at 9/10. She denies fever, chills, or any other complaint. Pt was on antibiotics "a few weeks ago".       The history is provided by the patient.     Review of patient's allergies indicates:   Allergen Reactions    Tramadol      Stomach upset and vomiting      Past Medical History:   Diagnosis Date    Anemia     Anxiety     Bipolar 1 disorder     Depression     Ear infection     Endometriosis     GERD (gastroesophageal reflux disease)     History of psychiatric care     History of psychiatric hospitalization     [facility]     Opiate use     Psychiatric problem     S/P gastric bypass     Suicidal ideation     Therapy     Dr Rosa On 14, scheduled appointment     Past Surgical History:   Procedure Laterality Date    ABDOMINAL SURGERY      Exp lap to r/o bowel obstruction. Pt says surg was neg     SECTION, LOW TRANSVERSE      x2    and 10/2016    DILATION AND CURETTAGE OF UTERUS      GASTRIC BYPASS       Family History   Problem Relation Age of Onset    Diabetes Mother     Depression Mother     Depression Sister     Cancer Maternal Grandmother 80     CRC     Social History   Substance Use Topics    Smoking status: Light Tobacco Smoker     Packs/day: 0.50     Years: 2.00     Types: Cigarettes     Last attempt to quit: 3/12/2016 "    Smokeless tobacco: Current User    Alcohol use No     Review of Systems   Constitutional: Negative for chills and fever.   HENT: Positive for dental problem. Negative for congestion and facial swelling.    Eyes: Negative for visual disturbance.   Respiratory: Negative for shortness of breath.    Cardiovascular: Negative for chest pain.   Gastrointestinal: Negative for abdominal pain, diarrhea, nausea and vomiting.   Endocrine: Negative for polyuria.   Genitourinary: Negative for dysuria.   Musculoskeletal: Negative for back pain.   Skin: Negative for rash.   Neurological: Negative for headaches.   All other systems reviewed and are negative.      Physical Exam     Initial Vitals [09/23/17 0822]   BP Pulse Resp Temp SpO2   126/81 93 18 99.2 °F (37.3 °C) 100 %      MAP       96         Physical Exam    Nursing note and vitals reviewed.  Constitutional: She appears well-developed and well-nourished. No distress.   HENT:   Head: Normocephalic and atraumatic.   Nose: Nose normal.   Mouth/Throat: Oropharynx is clear and moist. No trismus in the jaw. Dental caries present.   No lingual elevation. No trismus.  No palpable abscess.  Various dental caries in the left upper second incisor and the left lower 1st premolar on the labial aspect of the teeth at the gumline.  Mild gingival inflammation around teeth with dental caries.   Eyes: Conjunctivae and EOM are normal. Pupils are equal, round, and reactive to light.   Neck: Neck supple.   Cardiovascular: Normal rate, regular rhythm and normal heart sounds.   No murmur heard.  Pulmonary/Chest: Breath sounds normal. No respiratory distress. She has no wheezes. She has no rhonchi. She has no rales.   Abdominal: Soft. Bowel sounds are normal. There is no tenderness.   Musculoskeletal: Normal range of motion. She exhibits no edema.   Neurological: She is alert and oriented to person, place, and time. She has normal strength. No cranial nerve deficit or sensory deficit.   Skin:  Skin is warm and dry. No rash noted.   Psychiatric: She has a normal mood and affect.         ED Course   Procedures  Labs Reviewed - No data to display          Medical Decision Making:   ED Management:  Emergent evaluation of 32-year-old female with complaint of dental pain.  Vital signs are benign, afebrile.  On exam she's got several dental caries with positive tooth tap, but no sign of deep space infection.  Due to gingival inflammation, we'll treat with antibiotics and pain medication.  It was stressed to patient that she needs to follow-up with dentist for definitive treatment.            Scribe Attestation:   Scribe #1: I performed the above scribed service and the documentation accurately describes the services I performed. I attest to the accuracy of the note.    Attending Attestation:           Physician Attestation for Scribe:  Physician Attestation Statement for Scribe #1: I, , reviewed documentation, as scribed by Pattie Wagner in my presence, and it is both accurate and complete.                 ED Course      Clinical Impression:     1. Infected dental caries                                 Meenu Celaya MD  09/23/17 1714

## 2017-09-26 ENCOUNTER — HOSPITAL ENCOUNTER (EMERGENCY)
Facility: HOSPITAL | Age: 32
Discharge: HOME | End: 2017-09-26
Attending: EMERGENCY MEDICINE
Payer: MEDICAID

## 2017-09-26 VITALS
SYSTOLIC BLOOD PRESSURE: 121 MMHG | WEIGHT: 230 LBS | BODY MASS INDEX: 34.07 KG/M2 | HEIGHT: 69 IN | OXYGEN SATURATION: 100 % | DIASTOLIC BLOOD PRESSURE: 74 MMHG | HEART RATE: 114 BPM | RESPIRATION RATE: 18 BRPM | TEMPERATURE: 98 F

## 2017-09-26 DIAGNOSIS — K04.01 PULPITIS: Primary | ICD-10-CM

## 2017-09-26 DIAGNOSIS — K08.89 PAIN, DENTAL: ICD-10-CM

## 2017-09-26 LAB
B-HCG UR QL: NEGATIVE
CTP QC/QA: YES

## 2017-09-26 PROCEDURE — 99283 EMERGENCY DEPT VISIT LOW MDM: CPT | Mod: 25

## 2017-09-26 PROCEDURE — 81025 URINE PREGNANCY TEST: CPT | Performed by: EMERGENCY MEDICINE

## 2017-09-26 RX ORDER — DIFLUNISAL 500 MG/1
500 TABLET, FILM COATED ORAL 2 TIMES DAILY PRN
Qty: 30 TABLET | Refills: 0 | Status: SHIPPED | OUTPATIENT
Start: 2017-09-26 | End: 2017-10-02

## 2017-09-26 NOTE — ED PROVIDER NOTES
Encounter Date: 9/26/2017    SCRIBE #1 NOTE: Andreas PINEDA am scribing for, and in the presence of,  Dorothea Malin PA-C . I have scribed the following portions of the note - Other sections scribed: HPI/ROS .       History     Chief Complaint   Patient presents with    Dental Pain     States she cracked a tooth and her dentist appointment at [facility] isn't until october 20th.       CC: Dental Pain     HPI: This 32 y.o. female with a pertinent hx of opiate use presents to the ED c/o L upper and lower dental pain for the past month which worsened 2 days ago after a tooth cracked while she was cleaning it. Pt states her pain is constant, throbbing, and radiates to the L side of her face and L ear. Pain is worse with eating and contact with hot/cold air. Pt states she has been attempting tx with leftover 800 mg ibuprofen and percocet which she reports was prescribed to her weeks ago. She took her last tablet of Percocet at 4 AM today. Last ED visit for the same dental pain was 3 days ago. Pt states that she is currently taking a 2-week course of Amoxicillin for her dental pain. She reports having had dental X-rays and a dental examination at her doctor's office when her pain initially began 1 month ago, but could not afford the procedures. She has a scheduled appointment with [facility] on the 21st of next month. She denies fever, nausea, headache, and any other associated symptoms.         The history is provided by the patient. No  was used.     Review of patient's allergies indicates:   Allergen Reactions    Tramadol      Stomach upset and vomiting      Past Medical History:   Diagnosis Date    Anemia     Anxiety     Bipolar 1 disorder     Depression     Ear infection     Endometriosis     GERD (gastroesophageal reflux disease)     History of psychiatric care     History of psychiatric hospitalization     [facility]    Mounika     Opiate use      Psychiatric problem     S/P gastric bypass     Suicidal ideation     Therapy     Dr Rosa On 14, scheduled appointment     Past Surgical History:   Procedure Laterality Date    ABDOMINAL SURGERY      Exp lap to r/o bowel obstruction. Pt says surg was neg     SECTION, LOW TRANSVERSE      x2    and 10/2016    DILATION AND CURETTAGE OF UTERUS      GASTRIC BYPASS       Family History   Problem Relation Age of Onset    Diabetes Mother     Depression Mother     Depression Sister     Cancer Maternal Grandmother 80     CRC     Social History   Substance Use Topics    Smoking status: Light Tobacco Smoker     Packs/day: 0.50     Years: 2.00     Types: Cigarettes     Last attempt to quit: 3/12/2016    Smokeless tobacco: Current User    Alcohol use No     Review of Systems   Constitutional: Negative for chills and fever.   HENT: Positive for dental problem (L sided) and ear pain (L sided). Negative for facial swelling, sore throat and trouble swallowing.         (+) L sided facial pain    Respiratory: Negative for shortness of breath.    Gastrointestinal: Negative for diarrhea, nausea and vomiting.   Neurological: Negative for headaches.       Physical Exam     Initial Vitals [17 1157]   BP Pulse Resp Temp SpO2   121/74 (!) 114 18 98.3 °F (36.8 °C) 100 %      MAP       89.67         Physical Exam    Nursing note and vitals reviewed.  Constitutional: She appears well-developed and well-nourished. She is not diaphoretic. No distress.   HENT:   Head: Normocephalic and atraumatic.   Nose: Nose normal.   Mouth/Throat:       Eyes: EOM are normal. Pupils are equal, round, and reactive to light.   Neck: Normal range of motion. Neck supple.   Cardiovascular: Normal rate and regular rhythm.   No murmur heard.  Pulmonary/Chest: Breath sounds normal. No respiratory distress. She has no wheezes. She has no rhonchi. She has no rales.   Abdominal: Soft. Bowel sounds are normal. She exhibits no  distension. There is no tenderness. There is no rebound and no guarding.   Musculoskeletal: Normal range of motion. She exhibits no edema or tenderness.   Neurological: She is alert and oriented to person, place, and time. No cranial nerve deficit.   Skin: Skin is warm. No rash noted. No erythema.         ED Course   Procedures  Labs Reviewed   POCT URINE PREGNANCY             Medical Decision Making:   Differential Diagnosis:   This is an urgent evaluation of a 32-year-old female who presents to the emergency department complaining of dental pain.  The patient states that she is currently on amoxicillin that was given by her dentist for pulpitis.  She has a follow-up appointment scheduled in October.    The patient is currently afebrile and nontoxic in appearance.  Vital signs are stable.  On physical exam there are 2 dental caries noted to the maxillary and mandibular left side of the jaw.  There is some mild underlying gingival edema.  No obvious abscess formation.  There is no trismus noted.  The remaining physical exam is unremarkable.  I will encourage is patient to continue taking the Augmentin as prescribed by her dentist.  She will also need to follow-up with her dentist for treatment of the dental caries.  Dolobid prescribed for pain.  Signs and symptoms of worsening were thoroughly reviewed with her and she verbalized understanding and agreement.  Patient stable for discharge at this time.  This case was discussed with Dr. Schuster and she is in agreement with the assessment and treatment.            Scribe Attestation:   Scribe #1: I performed the above scribed service and the documentation accurately describes the services I performed. I attest to the accuracy of the note.    Attending Attestation:           Physician Attestation for Scribe:  Physician Attestation Statement for Scribe #1: I, Dorothea Malin PA-C , reviewed documentation, as scribed by Andreas Mendez  in my presence, and it is  both accurate and complete.                 ED Course      Clinical Impression:   The primary encounter diagnosis was Pulpitis. A diagnosis of Pain, dental was also pertinent to this visit.    Disposition:   Disposition: Discharged  Condition: Stable                        Dorothea Malin PA-C  09/26/17 [id number]

## 2017-09-26 NOTE — DISCHARGE INSTRUCTIONS
Please follow up with your dentist as scheduled.  Take medication with food.  Do not take IBU, aleve or aspirin with this medication.  Return to the ED if you have trouble opening your mouth or any other concerning symptoms.

## 2017-09-26 NOTE — ED TRIAGE NOTES
C/o constant throbbing dental pain to lt upper jaw radiating to ear, HA, lower lt. Jaw pain. Reports fever.  No OTC meds taken today. Reports tooth cracked 2 days ago.

## 2017-09-27 ENCOUNTER — HOSPITAL ENCOUNTER (EMERGENCY)
Facility: HOSPITAL | Age: 32
Discharge: HOME | End: 2017-09-27
Attending: EMERGENCY MEDICINE
Payer: MEDICAID

## 2017-09-27 VITALS
OXYGEN SATURATION: 97 % | BODY MASS INDEX: 34.07 KG/M2 | HEART RATE: 83 BPM | SYSTOLIC BLOOD PRESSURE: 118 MMHG | RESPIRATION RATE: 18 BRPM | DIASTOLIC BLOOD PRESSURE: 84 MMHG | TEMPERATURE: 99 F | HEIGHT: 69 IN | WEIGHT: 230 LBS

## 2017-09-27 DIAGNOSIS — T50.905A MEDICATION REACTION, INITIAL ENCOUNTER: Primary | ICD-10-CM

## 2017-09-27 DIAGNOSIS — K08.89 PAIN, DENTAL: ICD-10-CM

## 2017-09-27 PROCEDURE — 25000003 PHARM REV CODE 250: Performed by: NURSE PRACTITIONER

## 2017-09-27 PROCEDURE — 63600175 PHARM REV CODE 636 W HCPCS: Performed by: NURSE PRACTITIONER

## 2017-09-27 PROCEDURE — 99283 EMERGENCY DEPT VISIT LOW MDM: CPT | Mod: 25

## 2017-09-27 PROCEDURE — 96372 THER/PROPH/DIAG INJ SC/IM: CPT

## 2017-09-27 RX ORDER — PENICILLIN V POTASSIUM 250 MG/1
250 TABLET, FILM COATED ORAL EVERY 6 HOURS
Qty: 28 TABLET | Refills: 0 | Status: SHIPPED | OUTPATIENT
Start: 2017-09-27 | End: 2017-10-04

## 2017-09-27 RX ORDER — KETOROLAC TROMETHAMINE 30 MG/ML
10 INJECTION, SOLUTION INTRAMUSCULAR; INTRAVENOUS
Status: COMPLETED | OUTPATIENT
Start: 2017-09-27 | End: 2017-09-27

## 2017-09-27 RX ORDER — SULINDAC 150 MG/1
150 TABLET ORAL 2 TIMES DAILY
Qty: 10 TABLET | Refills: 0 | Status: SHIPPED | OUTPATIENT
Start: 2017-09-27 | End: 2017-10-02

## 2017-09-27 RX ORDER — ONDANSETRON 4 MG/1
4 TABLET, ORALLY DISINTEGRATING ORAL
Status: COMPLETED | OUTPATIENT
Start: 2017-09-27 | End: 2017-09-27

## 2017-09-27 RX ADMIN — ONDANSETRON 4 MG: 4 TABLET, ORALLY DISINTEGRATING ORAL at 11:09

## 2017-09-27 RX ADMIN — LIDOCAINE HYDROCHLORIDE: 20 SOLUTION ORAL; TOPICAL at 11:09

## 2017-09-27 RX ADMIN — KETOROLAC TROMETHAMINE 10 MG: 30 INJECTION, SOLUTION INTRAMUSCULAR at 11:09

## 2017-09-28 NOTE — ED PROVIDER NOTES
"Encounter Date: 2017       History     Chief Complaint   Patient presents with    Nausea     chipped tooth, "I came yesterday and they gave me new med which is upsetting my stomach"     Chief complaint: Nausea    History of present illness: Patient is a 32-year-old female who presents to the emergency department for consideration of nausea and diarrhea.  She was seen in the emergency department yesterday for dental pain with a chipped tooth.  She was instructed to continue taking Augmentin as well as previously prescribed by her dentist.  She is requesting Percocet 10 mg tablets for pain control.  Current pain level is 6/10.      The history is provided by the patient. No  was used.     Review of patient's allergies indicates:   Allergen Reactions    Tramadol      Stomach upset and vomiting      Past Medical History:   Diagnosis Date    Anemia     Anxiety     Bipolar 1 disorder     Depression     Ear infection     Endometriosis     GERD (gastroesophageal reflux disease)     History of psychiatric care     [facility] of [facility]     [facility]     Opiate use     Psychiatric problem     S/P gastric bypass     Suicidal ideation     Therapy     Dr Rosa On 14, scheduled appointment     Past Surgical History:   Procedure Laterality Date    ABDOMINAL SURGERY      Exp lap to r/o bowel obstruction. Pt says surg was neg     SECTION, LOW TRANSVERSE      x2    and 10/2016    DILATION AND CURETTAGE OF UTERUS      GASTRIC BYPASS       Family History   Problem Relation Age of Onset    Diabetes Mother     Depression Mother     Depression Sister     Cancer Maternal Grandmother 80     CRC     Social History   Substance Use Topics    Smoking status: Light Tobacco Smoker     Packs/day: 0.50     Years: 2.00     Types: Cigarettes     Last attempt to quit: 3/12/2016    Smokeless tobacco: Current User    Alcohol " use No     Review of Systems   Constitutional: Negative for chills, fatigue and fever.   HENT: Positive for dental problem. Negative for congestion, ear discharge, ear pain, postnasal drip, rhinorrhea, sinus pressure, sneezing, sore throat and voice change.    Eyes: Negative for discharge and itching.   Respiratory: Negative for cough, shortness of breath and wheezing.    Cardiovascular: Negative for chest pain, palpitations and leg swelling.   Gastrointestinal: Positive for diarrhea and nausea. Negative for abdominal pain, constipation and vomiting.   Endocrine: Negative for polydipsia, polyphagia and polyuria.   Genitourinary: Negative for dysuria, frequency, hematuria, urgency, vaginal bleeding, vaginal discharge and vaginal pain.   Musculoskeletal: Negative for arthralgias and myalgias.   Skin: Negative for rash and wound.   Neurological: Negative for dizziness, seizures, syncope, weakness and numbness.   Hematological: Negative for adenopathy. Does not bruise/bleed easily.   Psychiatric/Behavioral: Negative for self-injury and suicidal ideas. The patient is not nervous/anxious.        Physical Exam     Initial Vitals [09/27/17 1025]   BP Pulse Resp Temp SpO2   133/79 95 20 98.7 °F (37.1 °C) 99 %      MAP       97         Physical Exam    Nursing note and vitals reviewed.  Constitutional: She appears well-developed and well-nourished.   HENT:   Head: Normocephalic and atraumatic.   Right Ear: External ear normal.   Left Ear: External ear normal.   Nose: Nose normal.   Eyes: Conjunctivae and EOM are normal. Pupils are equal, round, and reactive to light. Right eye exhibits no discharge. Left eye exhibits no discharge.   Neck: Normal range of motion.   Abdominal: Soft. Normal appearance and bowel sounds are normal. She exhibits no distension. There is no tenderness.   Musculoskeletal: Normal range of motion.   Neurological: She is alert and oriented to person, place, and time.   Skin: Skin is dry. Capillary refill  "takes less than 2 seconds.         ED Course   Procedures  Labs Reviewed - No data to display          Medical Decision Making:   Initial Assessment:   32 y.o. female who presents for emergent consideration of Patient presents with:  Nausea: chipped tooth, "I came yesterday and they gave me new med which is upsetting my stomach"  .    ED Management:  Results Review: No diagnostic testing was performed in the emergency department other than thorough history and physical.    Differential diagnosis: Medication reaction, viral syndrome, gastroenteritis    Medical Diagnosis: The primary encounter diagnosis was Medication reaction, initial encounter. A diagnosis of Pain, dental was also pertinent to this visit.    ED Course: In the emergency department the patient was given Toradol 10 mg, Zofran 4 mg, and a GI cocktail.    Discharge Instructions: On discharge the following medications were orered: Penicillin VK, Clinoril and the patient was instructed to follow up with: Dentist    Patient was discharged home with an instructional sheet which gave not only information regarding the most likely diagnoses but also information regarding when to return to the emergency department for alarming symptoms and when to seek further care.    Care of the patient discussed with Dr. Miller who agreed with the assessment and plan.                     ED Course      Clinical Impression:   The primary encounter diagnosis was Medication reaction, initial encounter. A diagnosis of Pain, dental was also pertinent to this visit.    Disposition:   Disposition: Discharged  Condition: Stable                        Francisco Michel, SREEDHAR  09/27/17 2028    "

## 2017-10-02 ENCOUNTER — HOSPITAL ENCOUNTER (EMERGENCY)
Facility: OTHER | Age: 32
Discharge: HOME | End: 2017-10-02
Attending: EMERGENCY MEDICINE
Payer: MEDICAID

## 2017-10-02 VITALS
HEIGHT: 69 IN | RESPIRATION RATE: 18 BRPM | BODY MASS INDEX: 34.07 KG/M2 | WEIGHT: 230 LBS | HEART RATE: 74 BPM | DIASTOLIC BLOOD PRESSURE: 79 MMHG | SYSTOLIC BLOOD PRESSURE: 142 MMHG | OXYGEN SATURATION: 100 % | TEMPERATURE: 98 F

## 2017-10-02 DIAGNOSIS — K02.9 PAIN DUE TO DENTAL CARIES: Primary | ICD-10-CM

## 2017-10-02 PROCEDURE — 99283 EMERGENCY DEPT VISIT LOW MDM: CPT

## 2017-10-02 RX ORDER — OXAPROZIN 600 MG/1
600 TABLET, FILM COATED ORAL 2 TIMES DAILY PRN
Qty: 30 TABLET | Refills: 0 | Status: SHIPPED | OUTPATIENT
Start: 2017-10-02 | End: 2018-05-14

## 2017-10-02 NOTE — ED PROVIDER NOTES
Encounter Date: 10/2/2017       History     Chief Complaint   Patient presents with    Dental Injury     C/o left upper cracked tooth 3 days ago. States had previous injury to tooth but damaged further while brushing teeth.      32-year-old female with anemia, anxiety, bipolar depression, endometriosis, GERD presents to the emergency department with complaints of dental pain.  She states the pain is been present for several days.  She states the pain worsened over the last 3 days after her tooth cracked while brushing her teeth.  She denies fever, chills or oral swelling.  She admits that she is currently treating with penicillin.  She states that she has an appointment with [facility] at the end of this month however her pain has been uncontrolled at home.  She states the pain is currently an 8 out of 10.      The history is provided by the patient.     Review of patient's allergies indicates:   Allergen Reactions    Tramadol      Stomach upset and vomiting      Past Medical History:   Diagnosis Date    Anemia     Anxiety     Bipolar 1 disorder     Depression     Ear infection     Endometriosis     GERD (gastroesophageal reflux disease)     History of psychiatric care     History of psychiatric hospitalization     [facility]    Mounika     Opiate use     Psychiatric problem     S/P gastric bypass     Suicidal ideation     Therapy     Dr Rosa On 14, scheduled appointment     Past Surgical History:   Procedure Laterality Date    ABDOMINAL SURGERY      Exp lap to r/o bowel obstruction. Pt says surg was neg     SECTION, LOW TRANSVERSE      x2    and 10/2016    DILATION AND CURETTAGE OF UTERUS      GASTRIC BYPASS       Family History   Problem Relation Age of Onset    Diabetes Mother     Depression Mother     Depression Sister     Cancer Maternal Grandmother 80     CRC     Social History   Substance Use Topics    Smoking status: Former  Smoker     Packs/day: 0.50     Years: 2.00     Types: Cigarettes     Quit date: 3/12/2016    Smokeless tobacco: Current User    Alcohol use No     Review of Systems   Constitutional: Negative for chills and fever.   HENT: Positive for dental problem. Negative for facial swelling and sore throat.    Respiratory: Negative for shortness of breath.    Cardiovascular: Negative for chest pain.   Gastrointestinal: Negative for nausea and vomiting.   Genitourinary: Negative for dysuria.   Musculoskeletal: Negative for back pain.   Skin: Negative for rash.   Neurological: Negative for weakness.   Hematological: Does not bruise/bleed easily.       Physical Exam     Initial Vitals   BP Pulse Resp Temp SpO2   10/02/17 0847 10/02/17 0845 10/02/17 0845 10/02/17 0845 10/02/17 0845   (!) 142/79 74 18 98.3 °F (36.8 °C) 100 %      MAP       10/02/17 0847       100         Physical Exam    Nursing note and vitals reviewed.  Constitutional: She appears well-developed and well-nourished. She is not diaphoretic. She is Obese .  Non-toxic appearance. No distress.   HENT:   Head: Normocephalic and atraumatic.   Right Ear: Tympanic membrane, external ear and ear canal normal. No middle ear effusion.   Left Ear: Tympanic membrane, external ear and ear canal normal.  No middle ear effusion.   Nose: Nose normal.   Mouth/Throat: Uvula is midline, oropharynx is clear and moist and mucous membranes are normal. Mucous membranes are not dry. No trismus in the jaw. Dental caries present. No uvula swelling. No oropharyngeal exudate, posterior oropharyngeal edema, posterior oropharyngeal erythema or tonsillar abscesses.       Small cavity noted at the base of both the left upper lateral incisor and left lower first premolar.  No gingival inflammation or gingival fluctuance.  No drainage.  Left upper lateral incisor is loose however the rest of the tooth is intact.  Positive tenderness palpation.   Eyes: Conjunctivae, EOM and lids are normal. Pupils  are equal, round, and reactive to light. No scleral icterus.   Neck: Normal range of motion and phonation normal. Neck supple.   Cardiovascular: Normal rate, regular rhythm and normal heart sounds. Exam reveals no gallop and no friction rub.    No murmur heard.  Abdominal: Soft. Normal appearance and bowel sounds are normal. There is no tenderness. There is no rebound and no guarding.   Musculoskeletal: Normal range of motion.   No obvious deformities, moving all extremities, normal gait   Neurological: She is alert and oriented to person, place, and time. She has normal strength and normal reflexes. No sensory deficit.   Skin: Skin is warm, dry and intact. No lesion and no rash noted. No erythema.   Psychiatric: She has a normal mood and affect. Her speech is normal and behavior is normal. Judgment normal. Cognition and memory are normal.         ED Course   Procedures  Labs Reviewed - No data to display          Medical Decision Making:   History:   Old Medical Records: I decided to obtain old medical records.  Initial Assessment:   32-year-old female with complaints consistent with dental pain secondary to dental caries.  Afebrile and neurovascularly intact.  She is alert, healthy and nontoxic appearing.  She is in no apparent distress.  No signs of dental abscess.  No evidence of Guzman's angina.  No evidence of peritonsillar abscess.  I do not suspect retropharyngeal abscess.  Patient has been seen multiple times for the same pain.  She has documented history of multiple prescriptions for narcotics.  Currently being treated with penicillin.  ED Management:  She is urged to continue taking penicillin as prescribed.  I&D not indicated.  She is urged to follow-up with dental clinic at first available appointment or return for any worsening symptoms.  We'll discharged home with a prescription for oxaprozin and clove oil.  This patient was discussed with the attending physician who agrees with treatment  plan.  Other:   I have discussed this case with another health care provider.       <> Summary of the Discussion: Phillip  This note was created using Dragon Medical dictation.  There may be typographical errors secondary to dictation.                     ED Course      Clinical Impression:     1. Pain due to dental caries          Disposition:   Disposition: Discharged  Condition: Stable                        Lashanda Zazueta PA-C  10/02/17 0945

## 2018-04-08 ENCOUNTER — HOSPITAL ENCOUNTER (EMERGENCY)
Facility: HOSPITAL | Age: 33
Discharge: HOME | End: 2018-04-09
Attending: EMERGENCY MEDICINE
Payer: MEDICAID

## 2018-04-08 DIAGNOSIS — O26.891 RH NEGATIVE, MATERNAL, FIRST TRIMESTER: ICD-10-CM

## 2018-04-08 DIAGNOSIS — O03.9 FETAL DEMISE DUE TO MISCARRIAGE: Primary | ICD-10-CM

## 2018-04-08 DIAGNOSIS — O26.891 PELVIC PAIN AFFECTING PREGNANCY IN FIRST TRIMESTER, ANTEPARTUM: ICD-10-CM

## 2018-04-08 DIAGNOSIS — R10.2 PELVIC PAIN AFFECTING PREGNANCY IN FIRST TRIMESTER, ANTEPARTUM: ICD-10-CM

## 2018-04-08 DIAGNOSIS — N76.0 BV (BACTERIAL VAGINOSIS): ICD-10-CM

## 2018-04-08 DIAGNOSIS — Z67.91 RH NEGATIVE, MATERNAL, FIRST TRIMESTER: ICD-10-CM

## 2018-04-08 DIAGNOSIS — B96.89 BV (BACTERIAL VAGINOSIS): ICD-10-CM

## 2018-04-08 LAB
ALBUMIN SERPL BCP-MCNC: 4.1 G/DL
ALP SERPL-CCNC: 56 U/L
ALT SERPL W/O P-5'-P-CCNC: 18 U/L
ANION GAP SERPL CALC-SCNC: 9 MMOL/L
AST SERPL-CCNC: 25 U/L
B-HCG UR QL: POSITIVE
BASOPHILS # BLD AUTO: 0.05 K/UL
BASOPHILS NFR BLD: 0.9 %
BILIRUB SERPL-MCNC: 0.2 MG/DL
BILIRUB UR QL STRIP: NEGATIVE
BUN SERPL-MCNC: 11 MG/DL
CALCIUM SERPL-MCNC: 9.2 MG/DL
CHLORIDE SERPL-SCNC: 104 MMOL/L
CLARITY UR: CLEAR
CO2 SERPL-SCNC: 21 MMOL/L
COLOR UR: NORMAL
CREAT SERPL-MCNC: 0.8 MG/DL
CTP QC/QA: YES
DIFFERENTIAL METHOD: ABNORMAL
EOSINOPHIL # BLD AUTO: 0.1 K/UL
EOSINOPHIL NFR BLD: 1.1 %
ERYTHROCYTE [DISTWIDTH] IN BLOOD BY AUTOMATED COUNT: 17.5 %
EST. GFR  (AFRICAN AMERICAN): >60 ML/MIN/1.73 M^2
EST. GFR  (NON AFRICAN AMERICAN): >60 ML/MIN/1.73 M^2
GLUCOSE SERPL-MCNC: 112 MG/DL
GLUCOSE UR QL STRIP: NEGATIVE
HCT VFR BLD AUTO: 29.4 %
HGB BLD-MCNC: 9.3 G/DL
HGB UR QL STRIP: NEGATIVE
KETONES UR QL STRIP: NEGATIVE
LEUKOCYTE ESTERASE UR QL STRIP: NEGATIVE
LYMPHOCYTES # BLD AUTO: 2.4 K/UL
LYMPHOCYTES NFR BLD: 45.2 %
MCH RBC QN AUTO: 20.4 PG
MCHC RBC AUTO-ENTMCNC: 31.6 G/DL
MCV RBC AUTO: 65 FL
MONOCYTES # BLD AUTO: 0.5 K/UL
MONOCYTES NFR BLD: 9.2 %
NEUTROPHILS # BLD AUTO: 2.3 K/UL
NEUTROPHILS NFR BLD: 43.6 %
NITRITE UR QL STRIP: NEGATIVE
PH UR STRIP: 6 [PH] (ref 5–8)
PLATELET # BLD AUTO: 399 K/UL
PMV BLD AUTO: 9.9 FL
POTASSIUM SERPL-SCNC: 4 MMOL/L
PROT SERPL-MCNC: 7.4 G/DL
PROT UR QL STRIP: NEGATIVE
RBC # BLD AUTO: 4.55 M/UL
SODIUM SERPL-SCNC: 134 MMOL/L
SP GR UR STRIP: 1 (ref 1–1.03)
URN SPEC COLLECT METH UR: NORMAL
UROBILINOGEN UR STRIP-ACNC: NEGATIVE EU/DL
WBC # BLD AUTO: 5.35 K/UL

## 2018-04-08 PROCEDURE — 84702 CHORIONIC GONADOTROPIN TEST: CPT

## 2018-04-08 PROCEDURE — 86901 BLOOD TYPING SEROLOGIC RH(D): CPT | Mod: 91

## 2018-04-08 PROCEDURE — 86901 BLOOD TYPING SEROLOGIC RH(D): CPT

## 2018-04-08 PROCEDURE — 96372 THER/PROPH/DIAG INJ SC/IM: CPT

## 2018-04-08 PROCEDURE — 81025 URINE PREGNANCY TEST: CPT | Performed by: EMERGENCY MEDICINE

## 2018-04-08 PROCEDURE — 86850 RBC ANTIBODY SCREEN: CPT

## 2018-04-08 PROCEDURE — 99284 EMERGENCY DEPT VISIT MOD MDM: CPT | Mod: 25

## 2018-04-08 PROCEDURE — 80053 COMPREHEN METABOLIC PANEL: CPT

## 2018-04-08 PROCEDURE — 85025 COMPLETE CBC W/AUTO DIFF WBC: CPT

## 2018-04-08 PROCEDURE — 81003 URINALYSIS AUTO W/O SCOPE: CPT

## 2018-04-09 VITALS
HEART RATE: 88 BPM | HEIGHT: 69 IN | WEIGHT: 210 LBS | DIASTOLIC BLOOD PRESSURE: 61 MMHG | TEMPERATURE: 98 F | RESPIRATION RATE: 18 BRPM | OXYGEN SATURATION: 99 % | BODY MASS INDEX: 31.1 KG/M2 | SYSTOLIC BLOOD PRESSURE: 122 MMHG

## 2018-04-09 LAB
ABO + RH BLD: NORMAL
BACTERIA GENITAL QL WET PREP: ABNORMAL
BLD GP AB SCN CELLS X3 SERPL QL: NORMAL
C TRACH DNA SPEC QL NAA+PROBE: NOT DETECTED
CLUE CELLS VAG QL WET PREP: ABNORMAL
FILAMENT FUNGI VAG WET PREP-#/AREA: ABNORMAL
HCG INTACT+B SERPL-ACNC: 204 MIU/ML
INJECT RH IG VOL PATIENT: NORMAL ML
N GONORRHOEA DNA SPEC QL NAA+PROBE: NOT DETECTED
RH BLD: NORMAL
SPECIMEN SOURCE: ABNORMAL
T VAGINALIS GENITAL QL WET PREP: ABNORMAL
WBC #/AREA VAG WET PREP: ABNORMAL
YEAST GENITAL QL WET PREP: ABNORMAL

## 2018-04-09 PROCEDURE — 87491 CHLMYD TRACH DNA AMP PROBE: CPT

## 2018-04-09 PROCEDURE — 63600519 RHOGAM PHARM REV CODE 636 ALT 250 W HCPCS: Performed by: NURSE PRACTITIONER

## 2018-04-09 PROCEDURE — 87210 SMEAR WET MOUNT SALINE/INK: CPT

## 2018-04-09 RX ADMIN — HUMAN RHO(D) IMMUNE GLOBULIN 300 MCG: 300 INJECTION, SOLUTION INTRAMUSCULAR at 03:04

## 2018-04-09 NOTE — DISCHARGE INSTRUCTIONS
Follow-up with your OB/GYN as soon as possible for further evaluation and management.    Return to the emergency department for any new or worsening symptoms or as needed.

## 2018-04-09 NOTE — ED TRIAGE NOTES
Patient arrived to ED with c/o LLQ and RLQ abd pain and vaginal discharge that has become increasingly worse over the last couple of days.  Reports that she took a home pregnancy test which showed positive for pregnancy.  LMP was 03/05/2018 but not heavy flow as normal for her body.  Denies n/v, fever, urinary symptoms or vaginal bleeding.  Has not been seen by OB doctor for this pregnancy

## 2018-04-09 NOTE — ED PROVIDER NOTES
Encounter Date: 2018    SCRIBE #1 NOTE: I, Elif Coker, am scribing for, and in the presence of,  CLAUDETTE Glass. I have scribed the following portions of the note - Other sections scribed: HPI and ROS.       History     Chief Complaint   Patient presents with    Pelvic Pain     pt reports lower abdominal/pelvic cramping pain ongoing for a couple of weeks; pt took pregnancy test a few days ago and said it was positive but has not seen an OBGYN yet; LMP was ; pt very stongly smells of marijuana    Abdominal Pain     CC: Pelvic Pain    HPI: This 32 y.o female who has Anxiety, Endometriosis, GERD, Bipolar 1 disorder, Mounika, Anemia presents to the ED c/o acute, intermittent, 5/10 lower abdominal/pelvic pain that began 2 weeks ago.  Patient also reports of brown vaginal discharge.  Patient reports of recent positive UPT on 18.  She reports she has not been evaluated by an OBGYN for this pregnancy.  Patient denies fever, chills, nausea, emesis, diarrhea, back pain, dysuria, or any other associated symptoms.  No prior tx.  No alleviating factors.      The history is provided by the patient. No  was used.     Review of patient's allergies indicates:   Allergen Reactions    Tramadol      Stomach upset and vomiting      Past Medical History:   Diagnosis Date    Anemia     Anxiety     Bipolar 1 disorder     Depression     Ear infection     Endometriosis     GERD (gastroesophageal reflux disease)     History of psychiatric care     History of psychiatric hospitalization     [facility]     Opiate use     Psychiatric problem     S/P gastric bypass     Suicidal ideation     Therapy     Dr Rosa On 14, scheduled appointment     Past Surgical History:   Procedure Laterality Date    ABDOMINAL SURGERY      Exp lap to r/o bowel obstruction. Pt says surg was neg     SECTION, LOW TRANSVERSE      x2    and 10/2016     DILATION AND CURETTAGE OF UTERUS      GASTRIC BYPASS  2005     Family History   Problem Relation Age of Onset    Diabetes Mother     Depression Mother     Depression Sister     Cancer Maternal Grandmother 80     CRC     Social History   Substance Use Topics    Smoking status: Former Smoker     Packs/day: 0.50     Years: 2.00     Types: Cigarettes     Quit date: 3/12/2016    Smokeless tobacco: Current User    Alcohol use No     Review of Systems   Constitutional: Negative for chills and fever.   HENT: Negative for ear pain and sore throat.    Eyes: Negative for pain.   Respiratory: Negative for cough and shortness of breath.    Cardiovascular: Negative for chest pain.   Gastrointestinal: Positive for abdominal pain. Negative for diarrhea, nausea and vomiting.   Genitourinary: Positive for pelvic pain and vaginal discharge. Negative for dysuria, hematuria and vaginal bleeding.   Musculoskeletal: Negative for back pain.   Skin: Negative for rash.   Neurological: Negative for headaches.       Physical Exam     Initial Vitals [04/08/18 2215]   BP Pulse Resp Temp SpO2   136/67 107 16 98.2 °F (36.8 °C) 100 %      MAP       90         Physical Exam    Nursing note and vitals reviewed.  Constitutional: She appears well-developed and well-nourished. She is not diaphoretic. She is Obese . No distress.   HENT:   Head: Normocephalic and atraumatic.   Right Ear: External ear normal.   Left Ear: External ear normal.   Nose: Nose normal.   Eyes: Conjunctivae and EOM are normal. Pupils are equal, round, and reactive to light. Right eye exhibits no discharge. Left eye exhibits no discharge. No scleral icterus.   Neck: Normal range of motion. Neck supple. No thyromegaly present. No tracheal deviation present. No JVD present.   Cardiovascular: Normal rate, regular rhythm, normal heart sounds and intact distal pulses. Exam reveals no gallop and no friction rub.    No murmur heard.  Pulmonary/Chest: Breath sounds normal. No  stridor. No respiratory distress. She has no wheezes. She has no rhonchi. She has no rales.   Abdominal: Soft. Bowel sounds are normal. She exhibits no distension and no mass. There is no tenderness. There is no rebound and no guarding.   Genitourinary: Uterus normal. Pelvic exam was performed with patient supine. Uterus is not tender. Cervix exhibits no motion tenderness, no discharge and no friability. Right adnexum displays no mass, no tenderness and no fullness. Left adnexum displays no mass, no tenderness and no fullness. No erythema, tenderness or bleeding in the vagina. No foreign body in the vagina. No signs of injury around the vagina. Vaginal discharge (minimal white) found.   Genitourinary Comments: Minimal white discharge in the vaginal vault. No bleeding. Cervical os is closed.   Musculoskeletal: Normal range of motion. She exhibits no edema or tenderness.   Lymphadenopathy:     She has no cervical adenopathy.   Neurological: She is alert and oriented to person, place, and time. She has normal strength and normal reflexes. She displays normal reflexes. No cranial nerve deficit or sensory deficit.   Skin: Skin is warm and dry. No rash and no abscess noted. No erythema. No pallor.   Psychiatric: She has a normal mood and affect. Her behavior is normal. Judgment and thought content normal.         ED Course   Procedures  Labs Reviewed   CBC W/ AUTO DIFFERENTIAL - Abnormal; Notable for the following:        Result Value    Hemoglobin 9.3 (*)     Hematocrit 29.4 (*)     MCV 65 (*)     MCH 20.4 (*)     MCHC 31.6 (*)     RDW 17.5 (*)     Platelets 399 (*)     All other components within normal limits   COMPREHENSIVE METABOLIC PANEL - Abnormal; Notable for the following:     Sodium 134 (*)     CO2 21 (*)     Glucose 112 (*)     All other components within normal limits   VAGINAL SCREEN - Abnormal; Notable for the following:     Clue Cells, Wet Prep Occasional (*)     WBC - Vaginal Screen Rare (*)     Bacteria -  Vaginal Screen Occasional (*)     All other components within normal limits   POCT URINE PREGNANCY - Abnormal; Notable for the following:     POC Preg Test, Ur Positive (*)     All other components within normal limits   C. TRACHOMATIS/N. GONORRHOEAE BY AMP DNA   HCG, QUANTITATIVE, PREGNANCY   URINALYSIS   GROUP & RH   RH TYPING   INDIRECT ANTIGLOBULIN TEST   PREPARE RH IMMUNE GLOBULIN             Medical Decision Making:   Differential Diagnosis:   Probable fetal demise secondary to miscarriage. Consider but doubt ectopic pregnancy, molar pregnancy, normal IUP, PID, endometriosis, others  Clinical Tests:   Lab Tests: Ordered and Reviewed  Radiological Study: Ordered and Reviewed  ED Management:  32-year-old female presenting for evaluation of diffuse suprapubic pain. She reports positive pregnancy test 2-3 days ago. LMP 3/5/18 which puts patient weeks gestational age by dates. She reports vaginal discharge. She denies vaginal bleeding, nausea, vomiting, diarrhea, or any additional symptoms. Patient is afebrile, well-appearing, in no distress. Vital signs are stable within normal limits. Her abdomen is soft and nontender without rigidity or guarding. No peritoneal signs. Doubt surgical abdomen. There is mild white vaginal discharge in the vaginal vault. There is no blood in the vaginal vault. The cervical os is closed. No CMT. No adnexal or uterine TTP. Doubt PID. Vaginal screen remarkable for clue cells. Will defer treatment to OB. CBC remarkable for a symptomatic microcytic hypochromic anemia. Appears to be chronic. Other labs are unremarkable. HCG is 204 which is far lower than expected for gestational age. Blood type is O-. Patient given RhoGAM. Ultrasound shows abnormally shaped possible gestational sac with no definitive fetal components. Although suspect spontaneous  I instructed the patient to continue behaving as if she is pregnant until follow-up. Advised patient to follow-up with her OB/GYN in one  week for reevaluation. ED return precautions given. Patient expressed understanding of diagnosis, discharge instructions, return precautions.  Other:   I have discussed this case with another health care provider.       <> Summary of the Discussion: Case discussed with my attending physician who agreed with the assessment and plan.            Scribe Attestation:   Scribe #1: I performed the above scribed service and the documentation accurately describes the services I performed. I attest to the accuracy of the note.    Attending Attestation:           Physician Attestation for Scribe:  Physician Attestation Statement for Scribe #1: I, Addi Herr NP-C, reviewed documentation, as scribed by Elif Coker in my presence, and it is both accurate and complete.         Attending ED Notes:   I, Dr. Luis Armando Cabrales, personally performed the services described in this documentation. All medical record entries made by the scribe were at my direction and in my presence.  I have reviewed the chart and agree that the record reflects my personal performance and is accurate and complete. Luis Armando Cabrales MD.  1:15 AM 04/09/2018             Clinical Impression:   The primary encounter diagnosis was Fetal demise due to miscarriage. Diagnoses of Rh negative, maternal, first trimester, Pelvic pain affecting pregnancy in first trimester, antepartum, and BV (bacterial vaginosis) were also pertinent to this visit.    Disposition:   Disposition: Discharged  Condition: Stable                        Addi Herr NP  04/09/18 0500

## 2018-04-11 ENCOUNTER — HOSPITAL ENCOUNTER (EMERGENCY)
Facility: HOSPITAL | Age: 33
Discharge: HOME | End: 2018-04-12
Attending: EMERGENCY MEDICINE
Payer: MEDICAID

## 2018-04-11 DIAGNOSIS — R10.9 ABDOMINAL PAIN IN PREGNANCY: ICD-10-CM

## 2018-04-11 DIAGNOSIS — O26.899 ABDOMINAL PAIN IN PREGNANCY: ICD-10-CM

## 2018-04-11 DIAGNOSIS — O21.9 NAUSEA/VOMITING IN PREGNANCY: Primary | ICD-10-CM

## 2018-04-11 DIAGNOSIS — R50.9 FEVER, UNSPECIFIED FEVER CAUSE: ICD-10-CM

## 2018-04-11 LAB
ALBUMIN SERPL BCP-MCNC: 4.4 G/DL
ALP SERPL-CCNC: 55 U/L
ALT SERPL W/O P-5'-P-CCNC: 16 U/L
ANION GAP SERPL CALC-SCNC: 9 MMOL/L
AST SERPL-CCNC: 26 U/L
B-HCG UR QL: POSITIVE
BACTERIA #/AREA URNS HPF: NORMAL /HPF
BASOPHILS # BLD AUTO: 0.01 K/UL
BASOPHILS NFR BLD: 0.1 %
BILIRUB SERPL-MCNC: 0.4 MG/DL
BILIRUB UR QL STRIP: NEGATIVE
BUN SERPL-MCNC: 10 MG/DL
CALCIUM SERPL-MCNC: 9.5 MG/DL
CHLORIDE SERPL-SCNC: 108 MMOL/L
CLARITY UR: ABNORMAL
CO2 SERPL-SCNC: 21 MMOL/L
COLOR UR: ABNORMAL
CREAT SERPL-MCNC: 0.8 MG/DL
CTP QC/QA: YES
DIFFERENTIAL METHOD: ABNORMAL
EOSINOPHIL # BLD AUTO: 0 K/UL
EOSINOPHIL NFR BLD: 0 %
ERYTHROCYTE [DISTWIDTH] IN BLOOD BY AUTOMATED COUNT: 17.5 %
EST. GFR  (AFRICAN AMERICAN): >60 ML/MIN/1.73 M^2
EST. GFR  (NON AFRICAN AMERICAN): >60 ML/MIN/1.73 M^2
GLUCOSE SERPL-MCNC: 108 MG/DL
GLUCOSE UR QL STRIP: NEGATIVE
HCG INTACT+B SERPL-ACNC: 1003 MIU/ML
HCT VFR BLD AUTO: 31.9 %
HGB BLD-MCNC: 10.1 G/DL
HGB UR QL STRIP: ABNORMAL
HYALINE CASTS #/AREA URNS LPF: NORMAL /LPF
KETONES UR QL STRIP: ABNORMAL
LEUKOCYTE ESTERASE UR QL STRIP: NEGATIVE
LIPASE SERPL-CCNC: 27 U/L
LYMPHOCYTES # BLD AUTO: 1.1 K/UL
LYMPHOCYTES NFR BLD: 14.5 %
MCH RBC QN AUTO: 20.7 PG
MCHC RBC AUTO-ENTMCNC: 31.7 G/DL
MCV RBC AUTO: 65 FL
MICROSCOPIC COMMENT: NORMAL
MONOCYTES # BLD AUTO: 0.5 K/UL
MONOCYTES NFR BLD: 6 %
NEUTROPHILS # BLD AUTO: 6.1 K/UL
NEUTROPHILS NFR BLD: 79.5 %
NITRITE UR QL STRIP: NEGATIVE
PH UR STRIP: 5 [PH] (ref 5–8)
PLATELET # BLD AUTO: 408 K/UL
PMV BLD AUTO: 9.4 FL
POTASSIUM SERPL-SCNC: 3.6 MMOL/L
PROT SERPL-MCNC: 8.3 G/DL
PROT UR QL STRIP: ABNORMAL
RBC # BLD AUTO: 4.89 M/UL
RBC #/AREA URNS HPF: 0 /HPF (ref 0–4)
SODIUM SERPL-SCNC: 138 MMOL/L
SP GR UR STRIP: 1.03 (ref 1–1.03)
SQUAMOUS #/AREA URNS HPF: 6 /HPF
URN SPEC COLLECT METH UR: ABNORMAL
UROBILINOGEN UR STRIP-ACNC: NEGATIVE EU/DL
WBC # BLD AUTO: 7.67 K/UL
WBC #/AREA URNS HPF: 2 /HPF (ref 0–5)

## 2018-04-11 PROCEDURE — 25000003 PHARM REV CODE 250: Performed by: PHYSICIAN ASSISTANT

## 2018-04-11 PROCEDURE — 85025 COMPLETE CBC W/AUTO DIFF WBC: CPT

## 2018-04-11 PROCEDURE — 96361 HYDRATE IV INFUSION ADD-ON: CPT

## 2018-04-11 PROCEDURE — 63600175 PHARM REV CODE 636 W HCPCS: Performed by: PHYSICIAN ASSISTANT

## 2018-04-11 PROCEDURE — 80320 DRUG SCREEN QUANTALCOHOLS: CPT

## 2018-04-11 PROCEDURE — 83690 ASSAY OF LIPASE: CPT

## 2018-04-11 PROCEDURE — 96375 TX/PRO/DX INJ NEW DRUG ADDON: CPT

## 2018-04-11 PROCEDURE — 84702 CHORIONIC GONADOTROPIN TEST: CPT

## 2018-04-11 PROCEDURE — 96365 THER/PROPH/DIAG IV INF INIT: CPT

## 2018-04-11 PROCEDURE — 81025 URINE PREGNANCY TEST: CPT | Performed by: EMERGENCY MEDICINE

## 2018-04-11 PROCEDURE — 80307 DRUG TEST PRSMV CHEM ANLYZR: CPT

## 2018-04-11 PROCEDURE — 87086 URINE CULTURE/COLONY COUNT: CPT

## 2018-04-11 PROCEDURE — 99285 EMERGENCY DEPT VISIT HI MDM: CPT | Mod: 25

## 2018-04-11 PROCEDURE — 80053 COMPREHEN METABOLIC PANEL: CPT

## 2018-04-11 PROCEDURE — 81000 URINALYSIS NONAUTO W/SCOPE: CPT | Mod: 59

## 2018-04-11 RX ORDER — ONDANSETRON 2 MG/ML
4 INJECTION INTRAMUSCULAR; INTRAVENOUS
Status: COMPLETED | OUTPATIENT
Start: 2018-04-11 | End: 2018-04-11

## 2018-04-11 RX ORDER — ACETAMINOPHEN 500 MG
1000 TABLET ORAL
Status: COMPLETED | OUTPATIENT
Start: 2018-04-11 | End: 2018-04-11

## 2018-04-11 RX ADMIN — SODIUM CHLORIDE 1000 ML: 0.9 INJECTION, SOLUTION INTRAVENOUS at 10:04

## 2018-04-11 RX ADMIN — ONDANSETRON 4 MG: 2 INJECTION INTRAMUSCULAR; INTRAVENOUS at 10:04

## 2018-04-11 RX ADMIN — ACETAMINOPHEN 1000 MG: 500 TABLET, FILM COATED ORAL at 11:04

## 2018-04-12 VITALS
WEIGHT: 217 LBS | OXYGEN SATURATION: 100 % | TEMPERATURE: 98 F | SYSTOLIC BLOOD PRESSURE: 152 MMHG | DIASTOLIC BLOOD PRESSURE: 81 MMHG | HEART RATE: 65 BPM | RESPIRATION RATE: 18 BRPM | HEIGHT: 69 IN | BODY MASS INDEX: 32.14 KG/M2

## 2018-04-12 LAB
AMPHET+METHAMPHET UR QL: NEGATIVE
BACTERIA GENITAL QL WET PREP: ABNORMAL
BARBITURATES UR QL SCN>200 NG/ML: NEGATIVE
BENZODIAZ UR QL SCN>200 NG/ML: NEGATIVE
BZE UR QL SCN: NEGATIVE
C TRACH DNA SPEC QL NAA+PROBE: NOT DETECTED
CANNABINOIDS UR QL SCN: NORMAL
CLUE CELLS VAG QL WET PREP: ABNORMAL
CREAT UR-MCNC: 302.5 MG/DL
ETHANOL SERPL-MCNC: <10 MG/DL
FILAMENT FUNGI VAG WET PREP-#/AREA: ABNORMAL
METHADONE UR QL SCN>300 NG/ML: NEGATIVE
N GONORRHOEA DNA SPEC QL NAA+PROBE: NOT DETECTED
OPIATES UR QL SCN: NEGATIVE
PCP UR QL SCN>25 NG/ML: NEGATIVE
SPECIMEN SOURCE: ABNORMAL
T VAGINALIS GENITAL QL WET PREP: ABNORMAL
TOXICOLOGY INFORMATION: NORMAL
WBC #/AREA VAG WET PREP: ABNORMAL
YEAST GENITAL QL WET PREP: ABNORMAL

## 2018-04-12 PROCEDURE — 25000003 PHARM REV CODE 250: Performed by: EMERGENCY MEDICINE

## 2018-04-12 PROCEDURE — 63600175 PHARM REV CODE 636 W HCPCS: Performed by: PHYSICIAN ASSISTANT

## 2018-04-12 PROCEDURE — 87491 CHLMYD TRACH DNA AMP PROBE: CPT

## 2018-04-12 PROCEDURE — 63600175 PHARM REV CODE 636 W HCPCS: Performed by: EMERGENCY MEDICINE

## 2018-04-12 PROCEDURE — 87210 SMEAR WET MOUNT SALINE/INK: CPT

## 2018-04-12 RX ORDER — METOCLOPRAMIDE 10 MG/1
10 TABLET ORAL
Status: DISCONTINUED | OUTPATIENT
Start: 2018-04-12 | End: 2018-04-12

## 2018-04-12 RX ORDER — METOCLOPRAMIDE HYDROCHLORIDE 5 MG/ML
5 INJECTION INTRAMUSCULAR; INTRAVENOUS
Status: DISCONTINUED | OUTPATIENT
Start: 2018-04-12 | End: 2018-04-12

## 2018-04-12 RX ORDER — FAMOTIDINE 20 MG/1
20 TABLET, FILM COATED ORAL 2 TIMES DAILY
Qty: 60 TABLET | Refills: 0 | Status: SHIPPED | OUTPATIENT
Start: 2018-04-12 | End: 2018-06-15

## 2018-04-12 RX ORDER — PYRIDOXINE HCL (VITAMIN B6) 25 MG
25 TABLET ORAL NIGHTLY PRN
Qty: 15 TABLET | Refills: 0 | Status: SHIPPED | OUTPATIENT
Start: 2018-04-12 | End: 2018-05-14

## 2018-04-12 RX ORDER — PYRIDOXINE HYDROCHLORIDE 100 MG/ML
25 INJECTION, SOLUTION INTRAMUSCULAR; INTRAVENOUS DAILY
Status: DISCONTINUED | OUTPATIENT
Start: 2018-04-12 | End: 2018-04-12 | Stop reason: DRUGHIGH

## 2018-04-12 RX ORDER — DIPHENHYDRAMINE HYDROCHLORIDE 50 MG/ML
25 INJECTION INTRAMUSCULAR; INTRAVENOUS
Status: COMPLETED | OUTPATIENT
Start: 2018-04-12 | End: 2018-04-12

## 2018-04-12 RX ADMIN — DIPHENHYDRAMINE HYDROCHLORIDE 25 MG: 50 INJECTION, SOLUTION INTRAMUSCULAR; INTRAVENOUS at 01:04

## 2018-04-12 RX ADMIN — PYRIDOXINE HYDROCHLORIDE 25 MG: 100 INJECTION, SOLUTION INTRAMUSCULAR; INTRAVENOUS at 01:04

## 2018-04-12 NOTE — ED NOTES
"Pt. States that if we do not move her to a bed she will " click out". Pt. Moved to 34 when room became available.   "

## 2018-04-12 NOTE — DISCHARGE INSTRUCTIONS
Take doxylamine and pyridoxine, 25mg each, together as needed in the evenings to help ease nausea. Pepcid twice daily. Tylenol as needed for abdominal discomfort.    Follow-up with OB-GYN tomorrow for reevaluation and further recommendations. Return to this ED if pain worsens, if unable to tolerate by mouth intake, if you begin with fever, if any other problems occur.

## 2018-04-12 NOTE — ED PROVIDER NOTES
Encounter Date: 2018    SCRIBE #1 NOTE: I, Adarsh Cristo, am scribing for, and in the presence of, Myles Obrien PA-C. Other sections scribed: HPI, ROS.       History     Chief Complaint   Patient presents with    Abdominal Pain     with nausea and vomiting, she reports she is pregnant, LMP in march, EMS report pt has been sticking her finger down her throat during the duration of ride to hospital      CC: Abdominal Pain  HPI: This 32 y.o. gravid female 5w EGA with GERD, bipolar 1 disorder and Hx of gastric bypass, polysubstance abuse presents to the ED via EMS c/o severe abdominal cramping, nausea, and vomiting since 1500 this afternoon. She states that she cannot tolerate oral intake. She states that she is feeling weak tonight. She denies relief with Zofran. She did not know she had a fever. She denies dysuria, vaginal bleeding, diarrhea, cough, chest pain. EMS reports pt gagging herslef repeatedly en route.        The history is provided by the patient and the EMS personnel.     Review of patient's allergies indicates:   Allergen Reactions    Tramadol      Stomach upset and vomiting      Past Medical History:   Diagnosis Date    Anemia     Anxiety     Bipolar 1 disorder     Depression     Ear infection     Endometriosis     GERD (gastroesophageal reflux disease)     History of psychiatric care     History of psychiatric hospitalization     [facility]     Opiate use     Psychiatric problem     S/P gastric bypass     Suicidal ideation     Therapy     Dr Rosa On 14, scheduled appointment     Past Surgical History:   Procedure Laterality Date    ABDOMINAL SURGERY      Exp lap to r/o bowel obstruction. Pt says surg was neg     SECTION, LOW TRANSVERSE      x2    and 10/2016    DILATION AND CURETTAGE OF UTERUS      GASTRIC BYPASS       Family History   Problem Relation Age of Onset    Diabetes Mother     Depression Mother      Depression Sister     Cancer Maternal Grandmother 80     CRC     Social History   Substance Use Topics    Smoking status: Former Smoker     Packs/day: 0.50     Years: 2.00     Types: Cigarettes     Quit date: 3/12/2016    Smokeless tobacco: Current User    Alcohol use No     Review of Systems   Constitutional: Negative for chills and fever.   HENT: Negative for rhinorrhea and sore throat.    Eyes: Negative for pain and visual disturbance.   Respiratory: Negative for cough and shortness of breath.    Cardiovascular: Negative for chest pain.   Gastrointestinal: Positive for abdominal pain, nausea and vomiting. Negative for anal bleeding and diarrhea.   Genitourinary: Negative for dysuria and flank pain.   Musculoskeletal: Negative for arthralgias and myalgias.   Skin: Negative for rash.   Neurological: Positive for weakness. Negative for dizziness and headaches.       Physical Exam     Initial Vitals [04/11/18 2112]   BP Pulse Resp Temp SpO2   133/74 64 18 (!) 100.8 °F (38.2 °C) 100 %      MAP       93.67         Physical Exam    Nursing note and vitals reviewed.  Constitutional: She appears well-developed and well-nourished. She is not diaphoretic. No distress.   HENT:   Head: Normocephalic and atraumatic.   Eyes: Conjunctivae and EOM are normal. Pupils are equal, round, and reactive to light.   Neck: Normal range of motion. Neck supple. No tracheal deviation present.   Cardiovascular: Normal heart sounds.   Pulmonary/Chest: Breath sounds normal. No stridor. No respiratory distress. She has no wheezes.   Abdominal:   Abdomen overall soft, normal bowel sounds ×4.  Mild TTP suprapubic region, RLQ.  No rebound, no guarding.  No palpable mass, no distention.  No flank or CVA tenderness. Negative Jaramillo sign.   Genitourinary: Pelvic exam was performed with patient supine. There is no rash, tenderness, lesion or injury on the right labia. There is no rash, tenderness, lesion or injury on the left labia.   Genitourinary  Comments: Cervix os closed.  No cervix erythema or friability.  No discharge, bleeding, or tissue from os.  No cervix motion tenderness.  No significant discharge within vaginal vault.  No bleeding, tissue, or obvious injury.  No adnexal mass or tenderness.   Musculoskeletal: Normal range of motion. She exhibits no tenderness.   Lymphadenopathy:     She has no cervical adenopathy.   Neurological: She is alert and oriented to person, place, and time.   Skin: Skin is warm and dry. Capillary refill takes less than 2 seconds.   Psychiatric: She has a normal mood and affect. Her behavior is normal. Judgment and thought content normal.         ED Course   Procedures  Labs Reviewed   CBC W/ AUTO DIFFERENTIAL - Abnormal; Notable for the following:        Result Value    Hemoglobin 10.1 (*)     Hematocrit 31.9 (*)     MCV 65 (*)     MCH 20.7 (*)     MCHC 31.7 (*)     RDW 17.5 (*)     Platelets 408 (*)     Gran% 79.5 (*)     Lymph% 14.5 (*)     All other components within normal limits   COMPREHENSIVE METABOLIC PANEL - Abnormal; Notable for the following:     CO2 21 (*)     All other components within normal limits   URINALYSIS - Abnormal; Notable for the following:     Appearance, UA Cloudy (*)     Protein, UA 1+ (*)     Ketones, UA 2+ (*)     Occult Blood UA 1+ (*)     All other components within normal limits   VAGINAL SCREEN - Abnormal; Notable for the following:     Clue Cells, Wet Prep Occasional (*)     WBC - Vaginal Screen Rare (*)     Bacteria - Vaginal Screen Rare (*)     All other components within normal limits   POCT URINE PREGNANCY - Abnormal; Notable for the following:     POC Preg Test, Ur Positive (*)     All other components within normal limits   C. TRACHOMATIS/N. GONORRHOEAE BY AMP DNA   CULTURE, URINE   HCG, QUANTITATIVE, PREGNANCY   LIPASE   URINALYSIS MICROSCOPIC   ALCOHOL,MEDICAL (ETHANOL)   DRUG SCREEN PANEL, URINE EMERGENCY             Medical Decision Making:   ED Management:  32-year-old female  presents to ED complaining of acute onset lower abdominal cramping with radiation to bilateral low back, nausea with one episode nonbloody emesis, all of which began today.  Patient is approximately 5 weeks gravid via recent OB ultrasound.  Patient was evaluated in this ED on 17 due to lower abdominal cramping.  Incidentally found to be pregnant.  Given low hCG with questionable gestational sac, discharged with suspected fetal demise.  She did follow-up with her OB/GYN the following day, repeat hCG without significant elevation.  She does have an ultrasound scheduled for next week.  History of D&C secondary to endometriosis.  History of right inguinal abscess status post  section.  Differential at this time includes retained products of conception, UTI, STI, PID.  On exam, she is uncomfortable appearing although nontoxic.  Mild discomfort with palpation to suprapubic region, RLQ.  No flank or CVA tenderness.  Abdomen is soft with normal bowel sounds.   She is febrile although remainder of vitals within normal limits.  She denies any recent vaginal discharge or vaginal bleeding.  Pelvic exam unremarkable.  Pelvic ultrasound without evidence of TOA, ovarian torsion. No obvious heterotopic. No CMT.  I do think there is low likelihood of appendicitis. hcg continues to rise. Unsure of the cause of patient's fever. No obvious source of infection.  Nausea controlled in ED. On reevaluation, pt feels better, resting comfortably in bed. Will d/c with diclegis cocktail, have pt see OB. I have given her strict return precautions if unable to tolerate PO, if fever recurs, if unable to tolerate pain. She does feel comfortable with D/C. I do feel she is safe and stable for d/c without further intervention.  Other:   I have discussed this case with another health care provider.       <> Summary of the Discussion: I have discussed this case with Dr. Copeland.            Gonzalez Attestation:   Gonzalez #1: I performed the  above scribed service and the documentation accurately describes the services I performed. I attest to the accuracy of the note.    Attending Attestation:           Physician Attestation for Scribe:  Physician Attestation Statement for Scribe #1: I, Myles Obrien PA-C, reviewed documentation, as scribed by Adarsh Lemons in my presence, and it is both accurate and complete.                    Clinical Impression:   The primary encounter diagnosis was Nausea/vomiting in pregnancy. Diagnoses of Abdominal pain in pregnancy and Fever, unspecified fever cause were also pertinent to this visit.    Disposition:   Disposition: Discharged  Condition: Stable                        Myles Obrien PA-C  04/12/18 0217       Myles Obrien PA-C  04/12/18 0219

## 2018-04-12 NOTE — ED NOTES
"Pt. States she would like for the provider to order and given her, her night time medication " Ambien". Nurse explains to pt. That she will not be spending the night and medication should be taken in the comfort of her house. Provider made aware that pt.requested her " night time" medications.  "

## 2018-04-12 NOTE — ED TRIAGE NOTES
""I been nauseous and throwing up since around 3. I feel pain and I'm light-headed and weak." C/o N/V/D, mid abdominal pain. Pt is very anxious/agitated; denies taking meds PTA. Is currently pregnant; denies vaginal bleeding  "

## 2018-04-14 LAB — BACTERIA UR CULT: NORMAL

## 2018-05-14 PROBLEM — K08.89 TOOTHACHE: Status: RESOLVED | Noted: 2017-08-30 | Resolved: 2018-05-14

## 2018-06-15 ENCOUNTER — HOSPITAL ENCOUNTER (EMERGENCY)
Facility: HOSPITAL | Age: 33
Discharge: HOME | End: 2018-06-15
Attending: EMERGENCY MEDICINE
Payer: MEDICAID

## 2018-06-15 VITALS
RESPIRATION RATE: 20 BRPM | SYSTOLIC BLOOD PRESSURE: 128 MMHG | DIASTOLIC BLOOD PRESSURE: 90 MMHG | TEMPERATURE: 99 F | HEIGHT: 69 IN | WEIGHT: 220 LBS | OXYGEN SATURATION: 100 % | HEART RATE: 100 BPM | BODY MASS INDEX: 32.58 KG/M2

## 2018-06-15 DIAGNOSIS — R10.9 ABDOMINAL CRAMPING AFFECTING PREGNANCY: ICD-10-CM

## 2018-06-15 DIAGNOSIS — O26.899 ABDOMINAL CRAMPING AFFECTING PREGNANCY: ICD-10-CM

## 2018-06-15 DIAGNOSIS — N93.9 VAGINAL BLEEDING: Primary | ICD-10-CM

## 2018-06-15 LAB
ABO + RH BLD: NORMAL
BACTERIA GENITAL QL WET PREP: ABNORMAL
BILIRUB UR QL STRIP: NEGATIVE
BLD GP AB SCN CELLS X3 SERPL QL: NORMAL
CLARITY UR: CLEAR
CLUE CELLS VAG QL WET PREP: ABNORMAL
COLOR UR: ABNORMAL
FETAL CELL SCN BLD QL ROSETTE: NORMAL
FILAMENT FUNGI VAG WET PREP-#/AREA: ABNORMAL
GLUCOSE UR QL STRIP: NEGATIVE
HGB UR QL STRIP: NEGATIVE
KETONES UR QL STRIP: ABNORMAL
LEUKOCYTE ESTERASE UR QL STRIP: NEGATIVE
NITRITE UR QL STRIP: NEGATIVE
PH UR STRIP: 6 [PH] (ref 5–8)
PROT UR QL STRIP: NEGATIVE
SP GR UR STRIP: 1.01 (ref 1–1.03)
SPECIMEN SOURCE: ABNORMAL
T VAGINALIS GENITAL QL WET PREP: ABNORMAL
URN SPEC COLLECT METH UR: ABNORMAL
UROBILINOGEN UR STRIP-ACNC: NEGATIVE EU/DL
WBC #/AREA VAG WET PREP: ABNORMAL
YEAST GENITAL QL WET PREP: ABNORMAL

## 2018-06-15 PROCEDURE — 86901 BLOOD TYPING SEROLOGIC RH(D): CPT

## 2018-06-15 PROCEDURE — 99284 EMERGENCY DEPT VISIT MOD MDM: CPT

## 2018-06-15 PROCEDURE — 85461 HEMOGLOBIN FETAL: CPT

## 2018-06-15 PROCEDURE — 87491 CHLMYD TRACH DNA AMP PROBE: CPT

## 2018-06-15 PROCEDURE — 87210 SMEAR WET MOUNT SALINE/INK: CPT

## 2018-06-15 PROCEDURE — 81003 URINALYSIS AUTO W/O SCOPE: CPT

## 2018-06-15 PROCEDURE — 86850 RBC ANTIBODY SCREEN: CPT | Mod: 91

## 2018-06-15 NOTE — DISCHARGE INSTRUCTIONS
Your lab result in the emergency department showed that you have bacterial vaginosis.  You will need to follow up with your OBGYN for treatment.  You will need to be on pelvic rest until your cleared by her OBGYN.  This means do not have sexual intercourse or insertion of any devices into her vagina.  Do not do any strenuous activity or heavy lifting until cleared by OBGYN.  Please take Tylenol as needed for pain or discomfort.  You will need to call her OB GYNs office on Monday and make an appointment to be seen within the next couple days.     Please return to the Emergency Department for any new or worsening symptoms including:  fever, chest pain, shortness of breath, loss of consciousness, dizziness, weakness, or any other concerns.     Please follow up with your Primary Care Provider within in the week. If you do not have one, you may contact the one listed on your discharge paperwork or you may also call the [facility] Appointment Desk at [phone] to schedule an appointment with one.     Please take all medication as prescribed.

## 2018-06-15 NOTE — ED PROVIDER NOTES
Encounter Date: 6/15/2018  32-year-old pregnant female, 14 weeks, with vaginal bleeding x1 day and abdominal cramping.  Patient is Rh negative. Orders placed including RhoGAM preparation.  Patient will be seen by another provider for further evaluation.  Nick WELLS, 3:18 p.m.  SCRIBE #1 NOTE: I, Kaushik Kim, am scribing for, and in the presence of,  Betina Kruse NP. I have scribed the following portions of the note - Other sections scribed: HPI, ROS.       History     Chief Complaint   Patient presents with    Vaginal Bleeding     x 1 day. Pt reports being 14 weeks pregnant     Abdominal Cramping     x 3 days. RLQ     CC: Vaginal Spotting ; Abdominal Cramping    HPI: 33 y/o pregnant (15 weeks) female with medical hx of anemia, anxiety, bipolar 1 disorder, depression, endometriosis, GERD, psychiatric hospitilization, opiate use, SI, abdominal surgery, and  presents to the ED c/o vaginal spotting that began last night. Pt first noticed it on her underwear last night but notes seeing only now when wiping. Pt describes it as a bright red streak. Pt reports having sex in the last 3 days. Pt reported to this ED facility on 18 with similar symptoms and received a shot of RhoGAM. Pt is also c/o x3 day hx of intermittent right lower abdominal cramping. Pt denies concern for STD. Pt denies fever, chills, N/V/D, SOB, chest pain, vaginal discharge, or any other associated symptoms. No modifying factors or tx PTA.       The history is provided by the patient. No  was used.     Review of patient's allergies indicates:   Allergen Reactions    Tramadol      Stomach upset and vomiting      Past Medical History:   Diagnosis Date    Anemia     Anxiety     Bipolar 1 disorder     Depression     Endometriosis     GERD (gastroesophageal reflux disease)     History of psychiatric care     History of psychiatric hospitalization     [facility]      Opiate use     Psychiatric problem     Suicidal ideation     Therapy     Dr Rosa On 14, scheduled appointment     Past Surgical History:   Procedure Laterality Date    ABDOMINAL SURGERY      Exp lap to r/o bowel obstruction. Pt says surg was neg     SECTION, LOW TRANSVERSE      x2    and 10/2016    DILATION AND CURETTAGE OF UTERUS      GASTRIC BYPASS       Family History   Problem Relation Age of Onset    Diabetes Mother     Depression Mother     Depression Sister     Cancer Maternal Grandmother 80        CRC     Social History   Substance Use Topics    Smoking status: Former Smoker     Years: 2.00     Types: Cigarettes     Quit date: 3/12/2016    Smokeless tobacco: Never Used    Alcohol use No     Review of Systems   Constitutional: Negative for activity change, appetite change, chills and fever.   HENT: Negative for congestion, ear pain, rhinorrhea and sore throat.    Eyes: Negative for pain and redness.   Respiratory: Negative for cough, chest tightness and shortness of breath.    Cardiovascular: Negative for chest pain.   Gastrointestinal: Negative for abdominal pain, diarrhea, nausea and vomiting.        (+) Abdominal cramping   Genitourinary: Positive for vaginal bleeding (Spotting). Negative for decreased urine volume, difficulty urinating, dyspareunia, dysuria, flank pain, hematuria, pelvic pain, urgency, vaginal discharge and vaginal pain.   Musculoskeletal: Negative for arthralgias, back pain, myalgias, neck pain and neck stiffness.   Skin: Negative for rash.   Neurological: Negative for dizziness, syncope, weakness, light-headedness, numbness and headaches.   All other systems reviewed and are negative.      Physical Exam     Initial Vitals [06/15/18 1455]   BP Pulse Resp Temp SpO2   123/72 76 16 98.3 °F (36.8 °C) 100 %      MAP       --         Physical Exam    Nursing note and vitals reviewed.  Constitutional: Vital signs are normal. She appears well-developed and  well-nourished. She is cooperative.  Non-toxic appearance. She does not have a sickly appearance. She does not appear ill. No distress.   HENT:   Head: Normocephalic and atraumatic.   Eyes: Conjunctivae and EOM are normal. Pupils are equal, round, and reactive to light.   Neck: Normal range of motion and full passive range of motion without pain. Neck supple.   Cardiovascular: Normal rate, regular rhythm, normal heart sounds, intact distal pulses and normal pulses. Exam reveals no decreased pulses.    No murmur heard.  Pulmonary/Chest: Effort normal and breath sounds normal. No respiratory distress. She has no decreased breath sounds. She has no wheezes. She exhibits no tenderness.   Abdominal: Soft. Normal appearance and bowel sounds are normal. She exhibits no distension and no mass. There is no hepatosplenomegaly. There is no tenderness. There is no rigidity, no rebound, no guarding, no CVA tenderness, no tenderness at McBurney's point and negative Jaramillo's sign. No hernia. Hernia confirmed negative in the right inguinal area and confirmed negative in the left inguinal area.   Genitourinary: Rectum normal. Pelvic exam was performed with patient supine. No labial fusion. There is no rash, tenderness, lesion or injury on the right labia. There is no rash, tenderness, lesion or injury on the left labia. Uterus is enlarged. Cervix exhibits no motion tenderness, no discharge and no friability. Right adnexum displays no mass, no tenderness and no fullness. Left adnexum displays no mass, no tenderness and no fullness. No erythema, tenderness or bleeding in the vagina. No foreign body in the vagina. No signs of injury around the vagina. Vaginal discharge found.   Genitourinary Comments:  exam reveals small amount of thick white discharge in here to the vaginal wall and around cervix, no CMT, no cervical friability, cervical os closed, no blood or bloody discharge noted within the vaginal vault, no adnexal tenderness,  no trauma noted within the vagina, no rashes or lesions, fetal heart tones 156   Musculoskeletal: Normal range of motion.   Lymphadenopathy:        Right: No inguinal adenopathy present.        Left: No inguinal adenopathy present.   Neurological: She is alert and oriented to person, place, and time. She has normal reflexes.   Skin: Skin is warm and dry. Capillary refill takes less than 2 seconds. No pallor.   Psychiatric: She has a normal mood and affect. Her behavior is normal. Judgment and thought content normal.         ED Course   Procedures  Labs Reviewed   URINALYSIS - Abnormal; Notable for the following:        Result Value    Ketones, UA 1+ (*)     All other components within normal limits   VAGINAL SCREEN - Abnormal; Notable for the following:     Clue Cells, Wet Prep Moderate (*)     WBC - Vaginal Screen Few (*)     Bacteria - Vaginal Screen Few (*)     All other components within normal limits   C. TRACHOMATIS/N. GONORRHOEAE BY AMP DNA   TYPE & SCREEN   OLIVER - [facility] (FETAL BLEED SCREEN)   POST PARTUM TYPE AND SCREEN   PREPARE RH IMMUNE GLOBULIN          No orders to display              APC / Resident Notes:   Darlin Silva is a 32 y.o. female who presents to the Emergency Department for evaluation of intermittent right lower quadrant  x3 days with onset of vaginal spotting last night.  Patient reports she had sex within the last 3 days.     Physical Exam shows a non-toxic, afebrile, and well appearing female. Pertinent exam findings include breath sounds are clear and equal in all fields, normal effort, oxygen saturations 100%, with normal heart tones rate 76, abdomen is soft nontender, bowel sounds are present all quadrants and normoactive, there is no tenderness to palpation, no masses, guarding, rebound or rigidity or reason to suggest surgical abdomen at this time, no CVA tenderness, patient's uterus is enlarged and is above the pubic symphysis,  exam reveals small amount of thick white discharge  in here to the vaginal wall and around cervix, no CMT, no cervical friability, cervical os closed, no blood or bloody discharge noted within the vaginal vault, no adnexal tenderness, no trauma noted within the vagina, no rashes or lesions, fetal heart tones 156.  Patient reports there is no abdominal pain during assessment.    Vital Signs Are Reassuring. If available, previous records reviewed.     RESULTS:  Urinalysis is unremarkable for infection, vaginal screen shows moderate clue cells, note was sent to Dr. Shukla office and will defer treatment to her.  GC chlamydia pending, type and screen shows O negative.    My overall impression is vaginal bleeding in early pregnancy.  I considered but do not suspect at this time STI, PID, miscarriage, pyelonephritis, UTI, appendicitis, diverticulitis, colitis, bowel obstruction, others.      The patient was deemed safe and stable for discharge.    ED Course:  This case was discussed with Dr. Shukla patient's personal OBGYN.  She agrees with not administering RhoGAM at this time given no vaginal bleeding during exam.  Patient also agrees with this plan.  Dr. Shukla wants to see patient in clinic early next week and patient was instructed to call and make appointment.  Patient is amenable to this plan.  Patient was given strict instructions for pelvic rest and no strenuous activity or heavy lifting.  D/C Meds: none. Additional D/C Information:  Take Tylenol as needed for pain control.  ED return precautions given.  The diagnosis, treatment plan, instructions for follow-up and reevaluation with PCP as well as ED return precautions were discussed and understanding was verbalized. All questions or concerns have been addressed. Patient was discharged home with an instructional sheet which gave not only information regarding the most likely diagnoses but also information regarding when to return to the emergency department for alarming symptoms and when to seek  further care.      This case was discussed with  Dr. Jose who is in agreement with my assessment and plan.          Scribe Attestation:   Scribe #1: I performed the above scribed service and the documentation accurately describes the services I performed. I attest to the accuracy of the note.    Attending Attestation:           Physician Attestation for Scribe:  Physician Attestation Statement for Scribe #1: I, Betina Kruse NP, reviewed documentation, as scribed by Kaushik Kim in my presence, and it is both accurate and complete.                    Clinical Impression:   The primary encounter diagnosis was Vaginal bleeding. A diagnosis of Abdominal cramping affecting pregnancy was also pertinent to this visit.      Disposition:   Disposition: Discharged  Condition: Stable                        Betina Kruse NP  06/15/18 2798

## 2018-06-15 NOTE — ED TRIAGE NOTES
Pt states the abdominal cramping started three days ago and the vaginal bleeding started yesterday. Pt states when she goes to the bathroom and wipes it is on the toilet paper; but it is not enough to fill a pad.

## 2018-06-18 LAB
C TRACH DNA SPEC QL NAA+PROBE: NOT DETECTED
N GONORRHOEA DNA SPEC QL NAA+PROBE: NOT DETECTED

## 2018-09-11 PROBLEM — Z87.59 HISTORY OF PRIOR PREGNANCY WITH IUGR NEWBORN: Status: ACTIVE | Noted: 2018-09-11

## 2018-09-24 ENCOUNTER — HOSPITAL ENCOUNTER (OUTPATIENT)
Dept: OBSTETRICS AND GYNECOLOGY | Facility: HOSPITAL | Age: 33
Discharge: HOME | End: 2018-09-24
Attending: OBSTETRICS & GYNECOLOGY
Payer: MEDICAID

## 2018-09-24 DIAGNOSIS — Z34.83 PRENATAL CARE, SUBSEQUENT PREGNANCY, THIRD TRIMESTER: ICD-10-CM

## 2018-09-24 LAB — INJECT RH IG VOL PATIENT: NORMAL ML

## 2018-09-24 PROCEDURE — 63600519 RHOGAM PHARM REV CODE 636 ALT 250 W HCPCS: Performed by: OBSTETRICS & GYNECOLOGY

## 2018-09-24 RX ADMIN — HUMAN RHO(D) IMMUNE GLOBULIN 300 MCG: 300 INJECTION, SOLUTION INTRAMUSCULAR at 05:09
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
## 2018-11-30 PROBLEM — E55.9 VITAMIN D DEFICIENCY: Status: ACTIVE | Noted: 2018-11-30

## 2018-11-30 PROBLEM — D50.9 IDA (IRON DEFICIENCY ANEMIA): Status: ACTIVE | Noted: 2018-11-30

## 2018-12-04 ENCOUNTER — ANESTHESIA (OUTPATIENT)
Dept: OBSTETRICS AND GYNECOLOGY | Facility: HOSPITAL | Age: 33
End: 2018-12-04
Payer: MEDICAID

## 2018-12-04 ENCOUNTER — HOSPITAL ENCOUNTER (INPATIENT)
Facility: HOSPITAL | Age: 33
LOS: 2 days | Discharge: HOME | End: 2018-12-06
Attending: OBSTETRICS & GYNECOLOGY | Admitting: OBSTETRICS & GYNECOLOGY
Payer: MEDICAID

## 2018-12-04 ENCOUNTER — ANESTHESIA EVENT (OUTPATIENT)
Dept: OBSTETRICS AND GYNECOLOGY | Facility: HOSPITAL | Age: 33
End: 2018-12-04
Payer: MEDICAID

## 2018-12-04 DIAGNOSIS — Z34.83 PRENATAL CARE, SUBSEQUENT PREGNANCY, THIRD TRIMESTER: ICD-10-CM

## 2018-12-04 DIAGNOSIS — Z98.891 H/O: C-SECTION: ICD-10-CM

## 2018-12-04 LAB
ABO + RH BLD: NORMAL
BASOPHILS # BLD AUTO: 0 K/UL
BASOPHILS NFR BLD: 0 %
BLD GP AB SCN CELLS X3 SERPL QL: NORMAL
DIFFERENTIAL METHOD: ABNORMAL
EOSINOPHIL # BLD AUTO: 0 K/UL
EOSINOPHIL NFR BLD: 0.5 %
ERYTHROCYTE [DISTWIDTH] IN BLOOD BY AUTOMATED COUNT: 13 %
HCT VFR BLD AUTO: 36.3 %
HGB BLD-MCNC: 12.1 G/DL
LYMPHOCYTES # BLD AUTO: 1.9 K/UL
LYMPHOCYTES NFR BLD: 28.4 %
MCH RBC QN AUTO: 28.3 PG
MCHC RBC AUTO-ENTMCNC: 33.3 G/DL
MCV RBC AUTO: 85 FL
MONOCYTES # BLD AUTO: 0.6 K/UL
MONOCYTES NFR BLD: 9.2 %
NEUTROPHILS # BLD AUTO: 4.1 K/UL
NEUTROPHILS NFR BLD: 61.9 %
PLATELET # BLD AUTO: 197 K/UL
PMV BLD AUTO: 11.3 FL
RBC # BLD AUTO: 4.27 M/UL
WBC # BLD AUTO: 6.66 K/UL

## 2018-12-04 PROCEDURE — 85025 COMPLETE CBC W/AUTO DIFF WBC: CPT

## 2018-12-04 PROCEDURE — 59514 CESAREAN DELIVERY ONLY: CPT | Mod: ANES,,, | Performed by: ANESTHESIOLOGY

## 2018-12-04 PROCEDURE — 25000003 PHARM REV CODE 250: Performed by: OBSTETRICS & GYNECOLOGY

## 2018-12-04 PROCEDURE — 63600175 PHARM REV CODE 636 W HCPCS: Performed by: ANESTHESIOLOGY

## 2018-12-04 PROCEDURE — 27100025 HC TUBING, SET FLUID WARMER: Performed by: NURSE ANESTHETIST, CERTIFIED REGISTERED

## 2018-12-04 PROCEDURE — 63600175 PHARM REV CODE 636 W HCPCS: Performed by: OBSTETRICS & GYNECOLOGY

## 2018-12-04 PROCEDURE — 59514 CESAREAN DELIVERY ONLY: CPT | Mod: CRNA,,, | Performed by: NURSE ANESTHETIST, CERTIFIED REGISTERED

## 2018-12-04 PROCEDURE — 86592 SYPHILIS TEST NON-TREP QUAL: CPT

## 2018-12-04 PROCEDURE — 63600175 PHARM REV CODE 636 W HCPCS

## 2018-12-04 PROCEDURE — 51702 INSERT TEMP BLADDER CATH: CPT

## 2018-12-04 PROCEDURE — 37000008 HC ANESTHESIA 1ST 15 MINUTES: Performed by: OBSTETRICS & GYNECOLOGY

## 2018-12-04 PROCEDURE — 25000003 PHARM REV CODE 250: Performed by: NURSE ANESTHETIST, CERTIFIED REGISTERED

## 2018-12-04 PROCEDURE — 27200688 HC TRAY, SPINAL-HYPER/ ISOBARIC: Performed by: NURSE ANESTHETIST, CERTIFIED REGISTERED

## 2018-12-04 PROCEDURE — 63600175 PHARM REV CODE 636 W HCPCS: Performed by: NURSE ANESTHETIST, CERTIFIED REGISTERED

## 2018-12-04 PROCEDURE — 37000009 HC ANESTHESIA EA ADD 15 MINS: Performed by: OBSTETRICS & GYNECOLOGY

## 2018-12-04 PROCEDURE — 36000685 HC CESAREAN SECTION LEVEL I

## 2018-12-04 PROCEDURE — 86901 BLOOD TYPING SEROLOGIC RH(D): CPT

## 2018-12-04 PROCEDURE — 27201108 HC SURGICEL

## 2018-12-04 PROCEDURE — 27200918 HC ALEXIS O RING

## 2018-12-04 PROCEDURE — 11000001 HC ACUTE MED/SURG PRIVATE ROOM

## 2018-12-04 RX ORDER — FAMOTIDINE 10 MG/ML
20 INJECTION INTRAVENOUS ONCE
Status: DISCONTINUED | OUTPATIENT
Start: 2018-12-04 | End: 2018-12-04

## 2018-12-04 RX ORDER — SIMETHICONE 80 MG
1 TABLET,CHEWABLE ORAL EVERY 6 HOURS PRN
Status: DISCONTINUED | OUTPATIENT
Start: 2018-12-04 | End: 2018-12-06 | Stop reason: HOSPADM

## 2018-12-04 RX ORDER — METOCLOPRAMIDE HYDROCHLORIDE 5 MG/ML
INJECTION INTRAMUSCULAR; INTRAVENOUS
Status: DISCONTINUED | OUTPATIENT
Start: 2018-12-04 | End: 2018-12-04

## 2018-12-04 RX ORDER — PHENYLEPHRINE HYDROCHLORIDE 10 MG/ML
INJECTION INTRAVENOUS
Status: DISCONTINUED | OUTPATIENT
Start: 2018-12-04 | End: 2018-12-04

## 2018-12-04 RX ORDER — AMOXICILLIN 250 MG
1 CAPSULE ORAL NIGHTLY PRN
Status: DISCONTINUED | OUTPATIENT
Start: 2018-12-04 | End: 2018-12-06 | Stop reason: HOSPADM

## 2018-12-04 RX ORDER — NALOXONE HCL 0.4 MG/ML
0.02 VIAL (ML) INJECTION
Status: DISCONTINUED | OUTPATIENT
Start: 2018-12-04 | End: 2018-12-06 | Stop reason: HOSPADM

## 2018-12-04 RX ORDER — ACETAMINOPHEN 10 MG/ML
INJECTION, SOLUTION INTRAVENOUS
Status: DISCONTINUED | OUTPATIENT
Start: 2018-12-04 | End: 2018-12-04

## 2018-12-04 RX ORDER — SODIUM CHLORIDE 9 MG/ML
INJECTION, SOLUTION INTRAVENOUS
Status: DISCONTINUED | OUTPATIENT
Start: 2018-12-04 | End: 2018-12-06 | Stop reason: HOSPADM

## 2018-12-04 RX ORDER — OXYCODONE AND ACETAMINOPHEN 10; 325 MG/1; MG/1
1 TABLET ORAL EVERY 4 HOURS PRN
Status: DISCONTINUED | OUTPATIENT
Start: 2018-12-04 | End: 2018-12-06 | Stop reason: HOSPADM

## 2018-12-04 RX ORDER — KETOROLAC TROMETHAMINE 30 MG/ML
30 INJECTION, SOLUTION INTRAMUSCULAR; INTRAVENOUS EVERY 6 HOURS
Status: DISCONTINUED | OUTPATIENT
Start: 2018-12-04 | End: 2018-12-06

## 2018-12-04 RX ORDER — OXYTOCIN/RINGER'S LACTATE 20/1000 ML
41.65 PLASTIC BAG, INJECTION (ML) INTRAVENOUS CONTINUOUS
Status: ACTIVE | OUTPATIENT
Start: 2018-12-04 | End: 2018-12-04

## 2018-12-04 RX ORDER — BISACODYL 10 MG
10 SUPPOSITORY, RECTAL RECTAL ONCE AS NEEDED
Status: ACTIVE | OUTPATIENT
Start: 2018-12-04 | End: 2018-12-04

## 2018-12-04 RX ORDER — FAMOTIDINE 10 MG/ML
20 INJECTION INTRAVENOUS ONCE
Status: DISCONTINUED | OUTPATIENT
Start: 2018-12-04 | End: 2018-12-06 | Stop reason: HOSPADM

## 2018-12-04 RX ORDER — SODIUM CITRATE AND CITRIC ACID MONOHYDRATE 334; 500 MG/5ML; MG/5ML
30 SOLUTION ORAL ONCE
Status: DISCONTINUED | OUTPATIENT
Start: 2018-12-04 | End: 2018-12-04

## 2018-12-04 RX ORDER — HYDROMORPHONE HCL IN 0.9% NACL 6 MG/30 ML
PATIENT CONTROLLED ANALGESIA SYRINGE INTRAVENOUS CONTINUOUS
Status: DISCONTINUED | OUTPATIENT
Start: 2018-12-04 | End: 2018-12-04

## 2018-12-04 RX ORDER — GLYCOPYRROLATE 0.2 MG/ML
INJECTION INTRAMUSCULAR; INTRAVENOUS
Status: DISCONTINUED | OUTPATIENT
Start: 2018-12-04 | End: 2018-12-04

## 2018-12-04 RX ORDER — SODIUM CHLORIDE, SODIUM LACTATE, POTASSIUM CHLORIDE, CALCIUM CHLORIDE 600; 310; 30; 20 MG/100ML; MG/100ML; MG/100ML; MG/100ML
INJECTION, SOLUTION INTRAVENOUS CONTINUOUS
Status: ACTIVE | OUTPATIENT
Start: 2018-12-04 | End: 2018-12-05

## 2018-12-04 RX ORDER — FENTANYL CITRATE 50 UG/ML
INJECTION, SOLUTION INTRAMUSCULAR; INTRAVENOUS
Status: DISCONTINUED | OUTPATIENT
Start: 2018-12-04 | End: 2018-12-04

## 2018-12-04 RX ORDER — OXYTOCIN/RINGER'S LACTATE 20/1000 ML
41.7 PLASTIC BAG, INJECTION (ML) INTRAVENOUS CONTINUOUS
Status: ACTIVE | OUTPATIENT
Start: 2018-12-04 | End: 2018-12-04

## 2018-12-04 RX ORDER — OXYCODONE AND ACETAMINOPHEN 5; 325 MG/1; MG/1
1 TABLET ORAL EVERY 4 HOURS PRN
Status: DISCONTINUED | OUTPATIENT
Start: 2018-12-04 | End: 2018-12-06 | Stop reason: HOSPADM

## 2018-12-04 RX ORDER — HYDROMORPHONE HCL IN 0.9% NACL 6 MG/30 ML
PATIENT CONTROLLED ANALGESIA SYRINGE INTRAVENOUS CONTINUOUS
Status: CANCELLED | OUTPATIENT
Start: 2018-12-04

## 2018-12-04 RX ORDER — BUPIVACAINE HYDROCHLORIDE 7.5 MG/ML
INJECTION, SOLUTION INTRASPINAL
Status: DISCONTINUED | OUTPATIENT
Start: 2018-12-04 | End: 2018-12-04

## 2018-12-04 RX ORDER — HYDROCORTISONE 25 MG/G
CREAM TOPICAL 3 TIMES DAILY PRN
Status: DISCONTINUED | OUTPATIENT
Start: 2018-12-04 | End: 2018-12-06 | Stop reason: HOSPADM

## 2018-12-04 RX ORDER — ADHESIVE BANDAGE
30 BANDAGE TOPICAL 2 TIMES DAILY PRN
Status: DISCONTINUED | OUTPATIENT
Start: 2018-12-05 | End: 2018-12-06 | Stop reason: HOSPADM

## 2018-12-04 RX ORDER — NAPROXEN 500 MG/1
500 TABLET ORAL EVERY 8 HOURS PRN
Status: DISCONTINUED | OUTPATIENT
Start: 2018-12-04 | End: 2018-12-06 | Stop reason: HOSPADM

## 2018-12-04 RX ORDER — DOCUSATE SODIUM 100 MG/1
200 CAPSULE, LIQUID FILLED ORAL 2 TIMES DAILY
Status: DISCONTINUED | OUTPATIENT
Start: 2018-12-04 | End: 2018-12-06 | Stop reason: HOSPADM

## 2018-12-04 RX ORDER — SODIUM CHLORIDE, SODIUM LACTATE, POTASSIUM CHLORIDE, CALCIUM CHLORIDE 600; 310; 30; 20 MG/100ML; MG/100ML; MG/100ML; MG/100ML
INJECTION, SOLUTION INTRAVENOUS CONTINUOUS
Status: DISCONTINUED | OUTPATIENT
Start: 2018-12-04 | End: 2018-12-06 | Stop reason: HOSPADM

## 2018-12-04 RX ORDER — SODIUM CITRATE AND CITRIC ACID MONOHYDRATE 334; 500 MG/5ML; MG/5ML
30 SOLUTION ORAL ONCE
Status: DISCONTINUED | OUTPATIENT
Start: 2018-12-04 | End: 2018-12-06 | Stop reason: HOSPADM

## 2018-12-04 RX ORDER — ACETAMINOPHEN 10 MG/ML
1000 INJECTION, SOLUTION INTRAVENOUS EVERY 6 HOURS
Status: DISPENSED | OUTPATIENT
Start: 2018-12-04 | End: 2018-12-05

## 2018-12-04 RX ORDER — HYDROMORPHONE HYDROCHLORIDE 2 MG/ML
0.5 INJECTION, SOLUTION INTRAMUSCULAR; INTRAVENOUS; SUBCUTANEOUS ONCE
Status: COMPLETED | OUTPATIENT
Start: 2018-12-04 | End: 2018-12-04

## 2018-12-04 RX ORDER — OXYTOCIN/RINGER'S LACTATE 20/1000 ML
333 PLASTIC BAG, INJECTION (ML) INTRAVENOUS CONTINUOUS
Status: ACTIVE | OUTPATIENT
Start: 2018-12-04 | End: 2018-12-04

## 2018-12-04 RX ORDER — ONDANSETRON 2 MG/ML
4 INJECTION INTRAMUSCULAR; INTRAVENOUS EVERY 12 HOURS PRN
Status: CANCELLED | OUTPATIENT
Start: 2018-12-04

## 2018-12-04 RX ORDER — MUPIROCIN 20 MG/G
1 OINTMENT TOPICAL 2 TIMES DAILY
Status: DISCONTINUED | OUTPATIENT
Start: 2018-12-04 | End: 2018-12-06 | Stop reason: HOSPADM

## 2018-12-04 RX ORDER — CEFAZOLIN SODIUM 2 G/50ML
2 SOLUTION INTRAVENOUS
Status: DISCONTINUED | OUTPATIENT
Start: 2018-12-04 | End: 2018-12-06 | Stop reason: HOSPADM

## 2018-12-04 RX ORDER — ONDANSETRON 8 MG/1
8 TABLET, ORALLY DISINTEGRATING ORAL EVERY 8 HOURS PRN
Status: DISCONTINUED | OUTPATIENT
Start: 2018-12-04 | End: 2018-12-04

## 2018-12-04 RX ORDER — ACETAMINOPHEN 10 MG/ML
1000 INJECTION, SOLUTION INTRAVENOUS EVERY 6 HOURS
Status: DISCONTINUED | OUTPATIENT
Start: 2018-12-04 | End: 2018-12-04

## 2018-12-04 RX ORDER — NALOXONE HCL 0.4 MG/ML
0.02 VIAL (ML) INJECTION
Status: CANCELLED | OUTPATIENT
Start: 2018-12-04

## 2018-12-04 RX ORDER — OXYTOCIN/RINGER'S LACTATE 20/1000 ML
PLASTIC BAG, INJECTION (ML) INTRAVENOUS
Status: COMPLETED
Start: 2018-12-04 | End: 2018-12-04

## 2018-12-04 RX ORDER — OXYTOCIN 10 [USP'U]/ML
INJECTION, SOLUTION INTRAMUSCULAR; INTRAVENOUS
Status: DISCONTINUED | OUTPATIENT
Start: 2018-12-04 | End: 2018-12-04

## 2018-12-04 RX ORDER — FAMOTIDINE 10 MG/ML
20 INJECTION INTRAVENOUS ONCE
Status: CANCELLED | OUTPATIENT
Start: 2018-12-04 | End: 2018-12-04

## 2018-12-04 RX ORDER — ONDANSETRON 8 MG/1
8 TABLET, ORALLY DISINTEGRATING ORAL EVERY 8 HOURS PRN
Status: DISCONTINUED | OUTPATIENT
Start: 2018-12-04 | End: 2018-12-06 | Stop reason: HOSPADM

## 2018-12-04 RX ORDER — MISOPROSTOL 200 UG/1
600 TABLET ORAL
Status: DISCONTINUED | OUTPATIENT
Start: 2018-12-04 | End: 2018-12-06 | Stop reason: HOSPADM

## 2018-12-04 RX ORDER — ONDANSETRON 2 MG/ML
4 INJECTION INTRAMUSCULAR; INTRAVENOUS EVERY 12 HOURS PRN
Status: DISCONTINUED | OUTPATIENT
Start: 2018-12-04 | End: 2018-12-06 | Stop reason: HOSPADM

## 2018-12-04 RX ORDER — SODIUM CITRATE AND CITRIC ACID MONOHYDRATE 334; 500 MG/5ML; MG/5ML
30 SOLUTION ORAL ONCE
Status: CANCELLED | OUTPATIENT
Start: 2018-12-04 | End: 2018-12-04

## 2018-12-04 RX ORDER — NALOXONE HCL 0.4 MG/ML
0.02 VIAL (ML) INJECTION
Status: DISCONTINUED | OUTPATIENT
Start: 2018-12-04 | End: 2018-12-04

## 2018-12-04 RX ORDER — ACETAMINOPHEN 10 MG/ML
1000 INJECTION, SOLUTION INTRAVENOUS EVERY 6 HOURS
Status: CANCELLED | OUTPATIENT
Start: 2018-12-04 | End: 2018-12-05

## 2018-12-04 RX ORDER — HYDROMORPHONE HCL IN 0.9% NACL 6 MG/30 ML
PATIENT CONTROLLED ANALGESIA SYRINGE INTRAVENOUS CONTINUOUS
Status: DISCONTINUED | OUTPATIENT
Start: 2018-12-04 | End: 2018-12-06 | Stop reason: HOSPADM

## 2018-12-04 RX ORDER — DIPHENHYDRAMINE HCL 25 MG
25 CAPSULE ORAL EVERY 4 HOURS PRN
Status: DISCONTINUED | OUTPATIENT
Start: 2018-12-04 | End: 2018-12-06 | Stop reason: HOSPADM

## 2018-12-04 RX ADMIN — AZITHROMYCIN MONOHYDRATE 500 MG: 500 INJECTION, POWDER, LYOPHILIZED, FOR SOLUTION INTRAVENOUS at 06:12

## 2018-12-04 RX ADMIN — OXYCODONE HYDROCHLORIDE AND ACETAMINOPHEN 1 TABLET: 10; 325 TABLET ORAL at 08:12

## 2018-12-04 RX ADMIN — DIPHENHYDRAMINE HYDROCHLORIDE 25 MG: 25 CAPSULE ORAL at 10:12

## 2018-12-04 RX ADMIN — GLYCOPYRROLATE 0.2 MG: 0.2 INJECTION, SOLUTION INTRAMUSCULAR; INTRAVENOUS at 08:12

## 2018-12-04 RX ADMIN — Medication: at 10:12

## 2018-12-04 RX ADMIN — HYDROMORPHONE HYDROCHLORIDE 0.5 MG: 2 INJECTION, SOLUTION INTRAMUSCULAR; INTRAVENOUS; SUBCUTANEOUS at 11:12

## 2018-12-04 RX ADMIN — OXYTOCIN 20 UNITS: 10 INJECTION, SOLUTION INTRAMUSCULAR; INTRAVENOUS at 08:12

## 2018-12-04 RX ADMIN — PHENYLEPHRINE HYDROCHLORIDE 100 MCG: 10 INJECTION INTRAVENOUS at 08:12

## 2018-12-04 RX ADMIN — Medication 20 UNITS: at 11:12

## 2018-12-04 RX ADMIN — FENTANYL CITRATE 25 MCG: 50 INJECTION, SOLUTION INTRAMUSCULAR; INTRAVENOUS at 08:12

## 2018-12-04 RX ADMIN — MISOPROSTOL 600 MCG: 200 TABLET ORAL at 11:12

## 2018-12-04 RX ADMIN — PHENYLEPHRINE HYDROCHLORIDE 200 MCG: 10 INJECTION INTRAVENOUS at 08:12

## 2018-12-04 RX ADMIN — KETOROLAC TROMETHAMINE 30 MG: 30 INJECTION, SOLUTION INTRAMUSCULAR at 12:12

## 2018-12-04 RX ADMIN — MUPIROCIN 1 G: 20 OINTMENT TOPICAL at 11:12

## 2018-12-04 RX ADMIN — Medication: at 02:12

## 2018-12-04 RX ADMIN — KETOROLAC TROMETHAMINE 30 MG: 30 INJECTION, SOLUTION INTRAMUSCULAR at 05:12

## 2018-12-04 RX ADMIN — DIPHENHYDRAMINE HYDROCHLORIDE 25 MG: 25 CAPSULE ORAL at 11:12

## 2018-12-04 RX ADMIN — DIPHENHYDRAMINE HYDROCHLORIDE 25 MG: 25 CAPSULE ORAL at 04:12

## 2018-12-04 RX ADMIN — SODIUM CHLORIDE, SODIUM LACTATE, POTASSIUM CHLORIDE, AND CALCIUM CHLORIDE: .6; .31; .03; .02 INJECTION, SOLUTION INTRAVENOUS at 05:12

## 2018-12-04 RX ADMIN — BUPIVACAINE HYDROCHLORIDE IN DEXTROSE 1.6 ML: 7.5 INJECTION, SOLUTION SUBARACHNOID at 08:12

## 2018-12-04 RX ADMIN — MUPIROCIN 1 G: 20 OINTMENT TOPICAL at 08:12

## 2018-12-04 RX ADMIN — FENTANYL CITRATE 15 MCG: 50 INJECTION, SOLUTION INTRAMUSCULAR; INTRAVENOUS at 08:12

## 2018-12-04 RX ADMIN — ACETAMINOPHEN 1000 MG: 10 INJECTION, SOLUTION INTRAVENOUS at 08:12

## 2018-12-04 RX ADMIN — SODIUM CHLORIDE, SODIUM LACTATE, POTASSIUM CHLORIDE, AND CALCIUM CHLORIDE: .6; .31; .03; .02 INJECTION, SOLUTION INTRAVENOUS at 08:12

## 2018-12-04 RX ADMIN — ACETAMINOPHEN 1000 MG: 10 INJECTION, SOLUTION INTRAVENOUS at 03:12

## 2018-12-04 RX ADMIN — DOCUSATE SODIUM 200 MG: 100 CAPSULE, LIQUID FILLED ORAL at 08:12

## 2018-12-04 RX ADMIN — METOCLOPRAMIDE 10 MG: 5 INJECTION, SOLUTION INTRAMUSCULAR; INTRAVENOUS at 07:12

## 2018-12-04 RX ADMIN — SODIUM CHLORIDE, SODIUM LACTATE, POTASSIUM CHLORIDE, AND CALCIUM CHLORIDE 1000 ML: .6; .31; .03; .02 INJECTION, SOLUTION INTRAVENOUS at 05:12

## 2018-12-04 RX ADMIN — DOCUSATE SODIUM 200 MG: 100 CAPSULE, LIQUID FILLED ORAL at 11:12

## 2018-12-04 RX ADMIN — SODIUM CHLORIDE, SODIUM LACTATE, POTASSIUM CHLORIDE, AND CALCIUM CHLORIDE: .6; .31; .03; .02 INJECTION, SOLUTION INTRAVENOUS at 07:12

## 2018-12-04 RX ADMIN — SODIUM CHLORIDE, SODIUM LACTATE, POTASSIUM CHLORIDE, AND CALCIUM CHLORIDE: .6; .31; .03; .02 INJECTION, SOLUTION INTRAVENOUS at 09:12

## 2018-12-04 RX ADMIN — FENTANYL CITRATE 35 MCG: 50 INJECTION, SOLUTION INTRAMUSCULAR; INTRAVENOUS at 08:12

## 2018-12-04 NOTE — NURSING
1125: pt transported to mother baby unit via stretcher. SHENG Almanza at bedside. Transferred to bed, call light in reach, bed in low position, brake on, dante care performed. No needs at this time. Care handed off.

## 2018-12-04 NOTE — PLAN OF CARE
Problem: Patient Care Overview  Goal: Plan of Care Review  Outcome: Ongoing (interventions implemented as appropriate)  VSS.  Good pain control with PCA pump.  Concerns for baby's well being with extended transition.  Bean draining clear yellow urine.  SCD's to bilateral lower legs.  Pt verbalized understanding of plan of care.

## 2018-12-04 NOTE — PROGRESS NOTES
Pt sitting up in bed playing with baby.  Mother at bedside.  No complaints.  No signs of distress.

## 2018-12-04 NOTE — OP NOTE
2018      Pre-op: Term pregnancy    H/o opiate use, in remission    H/o prev c/s        Post-op:   Term pregnancy    H/o opiate use, in remission    H/o prev c/s          Procedure:  Repeat Low Transverse  Section with 2 layer closure    Indications: 33 y.o.  with IUP at 39w1d with h/o previous c/s x 2.      Findings:  male infant, vertex presentation, APGARS 9 at 1 minute, 9 at 5 minutes, weight is pending, normal uterus, tubes and ovaries.  Window defect on uterus      EBL: 650 cc    Specimen:  Placenta sent No    Complications:  None    Patient was taken to the operating room after informed consent.  Epidural anesthesia was found to be adequate.  She was prepped and draped in the normal sterile fashion in the dorsal supine position.  Bean catheter had been placed.  A Pfannenstiel skin incision was made  and carried down to the underlying layer of fascia with the Bovie.  The fascia was incised in the midline and extended laterally with the curved Corea scissors.  The inferior aspect of the fascial incision was grasped with the Ochsner clamps, and the rectus muscle was dissected off using the Bovie Cautery.  The superior aspect of the fascial incision was grasped with the Ochsner clamps, and the rectus muscle was dissected off using the curved Corea scissors.  The rectus muscles were  in the midline.  The peritoneum was grasped, elevated, and entered sharply with the Metzenbaum scissors.  The incision was extended superiorly and inferiorly with good visualization of the bladder.  The Octavia self-retaining retractor was placed within the peritoneal cavity and deployed. The vesicouterine peritoneum was grasped with the pick-ups, elevated, and entered sharply with the Metzenbaum scissors.  The incision was extended laterally, and the bladder flap was created digitally.  The bladder blade was reinserted.  There was a window defect.  A low transverse incision was made with the scalpel and  extended laterally with finger fracture technique.  Membranes were ruptured.  Clear fluid was present.  The vertex was delivered atraumatically.  The mouth and nose were suctioned with the bulb.  The remaining infant delivered without difficulty.  The cord was cut and clamped.  The infant was handed to the awaiting  nurse practitioner.  The placenta was delivered.  The uterus was cleared of all clots and debris.  The uterus was repaired in a running, locked fashion with #1 chromic.  A second layer using #1 chromic was used to imbricate the incision. The paracolic gutters were cleared of clots and debris.  The uterine incision was irrigated and found to be hemostasis.  The rectus muscles were plicated with chromic suture.  The fascia was closed with 1 looped PDS in a running fashion.  The subcutaneous tissue was re-approximated with 2-0 plain gut.  The skin was closed with Insorb stables.  An aquacel dressing was placed.     The patient tolerated the procedure without well.  All counts were correct.  Patient will be taken to the recovery room stable condition.    Chad Arambula MD

## 2018-12-04 NOTE — LACTATION NOTE
12/04/18 1215   Maternal Infant Assessment   Breast Density soft   Areola elastic   Nipple(s) everted   Breasts WDL   Breasts WDL WDL   Maternal Infant Feeding   Breastfeeding Education adequate infant intake;adequate milk volume;importance of skin-to-skin contact   Breastfeeding History   Breastfeeding History yes   Previous Exclusive Breastfeeding no  (few days)   Previous Breastfeeding Success unsuccessful   Duration of Previous Breastfeeding (few days)   Lactation Interventions   Maternal Breastfeeding Support encouragement offered;lactation counseling provided;maternal hydration promoted;maternal nutrition promoted;maternal rest encouraged;infant-mother separation minimized   Breastfeeding basics reviewed.

## 2018-12-04 NOTE — PROGRESS NOTES
Pt complains of pain with PCA and benadryl.  New orders noted per Dr. Arambula.  Pt verbalized understanding of plan of care.

## 2018-12-04 NOTE — INTERVAL H&P NOTE
The patient has been examined and the H&P has been reviewed:    I concur with the findings and no changes have occurred since H&P was written.     Asked again about BTL, patient politely declines    Anesthesia/Surgery risks, benefits and alternative options discussed and understood by patient/family.          Active Hospital Problems    Diagnosis  POA    Prenatal care, subsequent pregnancy, third trimester [Z34.83]  Not Applicable      Resolved Hospital Problems   No resolved problems to display.

## 2018-12-04 NOTE — LACTATION NOTE
Pt decided to just bottle feed.  Pt verbalized understanding of safe bottle feeding and preparation.      Safe formula feeding handout given and reviewed.  Discussed proper hand washing, expiration time of formula, position of baby, position of nipple and bottle while feeding, baby led feeding and fullness cues.  Pt verbalized understanding and verbalized appropriate recall.    Instructed on the risks of formula feeding including:   Lacks the nutrients found in colostrums to help prevent infection, mature the gut, aid in digestion and resist allergies   Contains artificial additives and preservatives which increases incidence of contamination   Increase spitting up due to slower digestion   Increased cost and requires preparation, including bottle sanitation and formula refrigeration   Increased incidence of NEC for the  baby   Increased risk of diabetes with family history, SIDS and ear infections   Skipped feedings for the breastfeeding mother increases chance of engorgement, mastitis and plugged ducts   Decreases breastfeeding babys appetite resulting in poor feeding session, decreased breast stimulation and poor milk supply   Exposes the breastfeeding baby to the possibility of allergic reactions and colic  Pt states understanding and verbalized appropriate recall.

## 2018-12-04 NOTE — ANESTHESIA PREPROCEDURE EVALUATION
12/04/2018  Darlin Silva is a 33 y.o., female.    Anesthesia Evaluation    I have reviewed the Patient Summary Reports.    I have reviewed the Nursing Notes.   I have reviewed the Medications.     Review of Systems  Anesthesia Hx:  No problems with previous Anesthesia  History of prior surgery of interest to airway management or planning: Previous anesthesia: General, Spinal Denies Family Hx of Anesthesia complications.   Denies Personal Hx of Anesthesia complications.   Social:  Former Smoker    Hematology/Oncology:         -- Anemia:   Cardiovascular:  Cardiovascular Normal Exercise tolerance: good     Pulmonary:  Pulmonary Normal    Renal/:  Renal/ Normal     Hepatic/GI:   GERD    Musculoskeletal:  Musculoskeletal Normal    OB/GYN/PEDS:  Endometriosis   Neurological:  Neurology Normal    Endocrine:  Endocrine Normal    Psych:   Psychiatric History depression Sher, suicidal ideation in past.   H/o opoid abuse.   Marijuana abuse         Physical Exam  General:  Morbid Obesity    Airway/Jaw/Neck:  Airway Findings: Mouth Opening: Normal General Airway Assessment: Adult  Mallampati: III  TM Distance: Normal, at least 6 cm      Dental:  Dental Findings: In tact        Mental Status:  Mental Status Findings:  Cooperative, Alert and Oriented         Anesthesia Plan  Type of Anesthesia, risks & benefits discussed:  Anesthesia Type:  spinal  Patient's Preference:   Intra-op Monitoring Plan: standard ASA monitors  Intra-op Monitoring Plan Comments:   Post Op Pain Control Plan: multimodal analgesia, IV/PO Opioids PRN and per primary service following discharge from PACU  Post Op Pain Control Plan Comments:   Induction:    Beta Blocker:  Patient is not currently on a Beta-Blocker (No further documentation required).       Informed Consent: Patient understands risks and agrees with Anesthesia plan.  Questions  answered. Anesthesia consent signed with patient.  ASA Score: 2     Day of Surgery Review of History & Physical: I have interviewed and examined the patient. I have reviewed the patient's H&P dated: 11/29.           Ready For Surgery From Anesthesia Perspective.

## 2018-12-04 NOTE — TRANSFER OF CARE
"Anesthesia Transfer of Care Note    Patient: Darlin Silva    Procedure(s) Performed: Procedure(s) (LRB):   SECTION (N/A)    Patient location: Labor and Delivery    Anesthesia Type: spinal    Transport from OR: Transported from OR on room air with adequate spontaneous ventilation    Post pain: adequate analgesia    Post assessment: tolerated procedure well and no apparent anesthetic complications    Post vital signs: stable    Level of consciousness: awake, alert and oriented    Nausea/Vomiting: no nausea/vomiting    Complications: none    Transfer of care protocol was followed      Last vitals:   Visit Vitals  BP (!) 123/58   Pulse (!) 50   Temp 36.4 °C (97.5 °F)   Resp 18   Ht 5' 9" (1.753 m)   Wt 119.3 kg (263 lb)   LMP 2018   SpO2 98%   Breastfeeding? No   BMI 38.84 kg/m²     "

## 2018-12-05 LAB
ABO + RH BLD: NORMAL
APTT BLDCRRT: 27.5 SEC
BASOPHILS # BLD AUTO: 0.01 K/UL
BASOPHILS NFR BLD: 0.1 %
BLD GP AB SCN CELLS X3 SERPL QL: NORMAL
DIFFERENTIAL METHOD: ABNORMAL
EOSINOPHIL # BLD AUTO: 0 K/UL
EOSINOPHIL # BLD AUTO: 0 K/UL
EOSINOPHIL # BLD AUTO: 0.1 K/UL
EOSINOPHIL NFR BLD: 0.5 %
EOSINOPHIL NFR BLD: 0.5 %
EOSINOPHIL NFR BLD: 0.6 %
ERYTHROCYTE [DISTWIDTH] IN BLOOD BY AUTOMATED COUNT: 12.9 %
ERYTHROCYTE [DISTWIDTH] IN BLOOD BY AUTOMATED COUNT: 12.9 %
ERYTHROCYTE [DISTWIDTH] IN BLOOD BY AUTOMATED COUNT: 13 %
FETAL CELL SCN BLD QL ROSETTE: NORMAL
FIBRINOGEN PPP-MCNC: 302 MG/DL
HCT VFR BLD AUTO: 30.2 %
HCT VFR BLD AUTO: 31.2 %
HCT VFR BLD AUTO: 31.2 %
HGB BLD-MCNC: 10.1 G/DL
HGB BLD-MCNC: 10.4 G/DL
HGB BLD-MCNC: 10.4 G/DL
INJECT RH IG VOL PATIENT: NORMAL ML
INR PPP: 1
LYMPHOCYTES # BLD AUTO: 1.3 K/UL
LYMPHOCYTES # BLD AUTO: 2 K/UL
LYMPHOCYTES # BLD AUTO: 2 K/UL
LYMPHOCYTES NFR BLD: 14.3 %
LYMPHOCYTES NFR BLD: 23.2 %
LYMPHOCYTES NFR BLD: 23.2 %
MCH RBC QN AUTO: 28.5 PG
MCH RBC QN AUTO: 28.5 PG
MCH RBC QN AUTO: 28.9 PG
MCHC RBC AUTO-ENTMCNC: 33.3 G/DL
MCHC RBC AUTO-ENTMCNC: 33.3 G/DL
MCHC RBC AUTO-ENTMCNC: 33.4 G/DL
MCV RBC AUTO: 86 FL
MCV RBC AUTO: 86 FL
MCV RBC AUTO: 87 FL
MONOCYTES # BLD AUTO: 0.6 K/UL
MONOCYTES # BLD AUTO: 0.7 K/UL
MONOCYTES # BLD AUTO: 0.7 K/UL
MONOCYTES NFR BLD: 7.2 %
MONOCYTES NFR BLD: 8.5 %
MONOCYTES NFR BLD: 8.5 %
NEUTROPHILS # BLD AUTO: 5.8 K/UL
NEUTROPHILS # BLD AUTO: 5.8 K/UL
NEUTROPHILS # BLD AUTO: 6.8 K/UL
NEUTROPHILS NFR BLD: 67.7 %
NEUTROPHILS NFR BLD: 67.7 %
NEUTROPHILS NFR BLD: 77.8 %
PLATELET # BLD AUTO: 178 K/UL
PLATELET # BLD AUTO: 178 K/UL
PLATELET # BLD AUTO: 186 K/UL
PMV BLD AUTO: 10.6 FL
PROTHROMBIN TIME: 10 SEC
RBC # BLD AUTO: 3.49 M/UL
RBC # BLD AUTO: 3.65 M/UL
RBC # BLD AUTO: 3.65 M/UL
RPR SER QL: NORMAL
WBC # BLD AUTO: 8.57 K/UL
WBC # BLD AUTO: 8.57 K/UL
WBC # BLD AUTO: 8.77 K/UL

## 2018-12-05 PROCEDURE — 36415 COLL VENOUS BLD VENIPUNCTURE: CPT

## 2018-12-05 PROCEDURE — 25000003 PHARM REV CODE 250: Performed by: OBSTETRICS & GYNECOLOGY

## 2018-12-05 PROCEDURE — 63600175 PHARM REV CODE 636 W HCPCS: Performed by: OBSTETRICS & GYNECOLOGY

## 2018-12-05 PROCEDURE — 85384 FIBRINOGEN ACTIVITY: CPT

## 2018-12-05 PROCEDURE — 11000001 HC ACUTE MED/SURG PRIVATE ROOM

## 2018-12-05 PROCEDURE — 85025 COMPLETE CBC W/AUTO DIFF WBC: CPT | Mod: 91

## 2018-12-05 PROCEDURE — 25000003 PHARM REV CODE 250

## 2018-12-05 PROCEDURE — 63600175 PHARM REV CODE 636 W HCPCS

## 2018-12-05 PROCEDURE — 86850 RBC ANTIBODY SCREEN: CPT

## 2018-12-05 PROCEDURE — 85730 THROMBOPLASTIN TIME PARTIAL: CPT

## 2018-12-05 PROCEDURE — 85610 PROTHROMBIN TIME: CPT

## 2018-12-05 PROCEDURE — 85461 HEMOGLOBIN FETAL: CPT

## 2018-12-05 PROCEDURE — 63600519 RHOGAM PHARM REV CODE 636 ALT 250 W HCPCS: Performed by: OBSTETRICS & GYNECOLOGY

## 2018-12-05 RX ORDER — OXYTOCIN/RINGER'S LACTATE 20/1000 ML
41.65 PLASTIC BAG, INJECTION (ML) INTRAVENOUS CONTINUOUS
Status: DISPENSED | OUTPATIENT
Start: 2018-12-05 | End: 2018-12-05

## 2018-12-05 RX ORDER — CARBOPROST TROMETHAMINE 250 UG/ML
250 INJECTION, SOLUTION INTRAMUSCULAR ONCE
Status: DISCONTINUED | OUTPATIENT
Start: 2018-12-05 | End: 2018-12-06 | Stop reason: HOSPADM

## 2018-12-05 RX ORDER — MISOPROSTOL 200 UG/1
400 TABLET ORAL ONCE
Status: COMPLETED | OUTPATIENT
Start: 2018-12-05 | End: 2018-12-05

## 2018-12-05 RX ORDER — CITALOPRAM 20 MG/1
20 TABLET, FILM COATED ORAL DAILY
Status: DISCONTINUED | OUTPATIENT
Start: 2018-12-05 | End: 2018-12-06 | Stop reason: HOSPADM

## 2018-12-05 RX ORDER — CARBOPROST TROMETHAMINE 250 UG/ML
INJECTION, SOLUTION INTRAMUSCULAR
Status: COMPLETED
Start: 2018-12-05 | End: 2018-12-05

## 2018-12-05 RX ORDER — OXYTOCIN/RINGER'S LACTATE 20/1000 ML
PLASTIC BAG, INJECTION (ML) INTRAVENOUS
Status: COMPLETED
Start: 2018-12-05 | End: 2018-12-05

## 2018-12-05 RX ADMIN — OXYCODONE HYDROCHLORIDE AND ACETAMINOPHEN 1 TABLET: 10; 325 TABLET ORAL at 10:12

## 2018-12-05 RX ADMIN — CARBOPROST TROMETHAMINE 250 MCG: 250 INJECTION, SOLUTION INTRAMUSCULAR at 12:12

## 2018-12-05 RX ADMIN — KETOROLAC TROMETHAMINE 30 MG: 30 INJECTION, SOLUTION INTRAMUSCULAR at 06:12

## 2018-12-05 RX ADMIN — KETOROLAC TROMETHAMINE 30 MG: 30 INJECTION, SOLUTION INTRAMUSCULAR at 11:12

## 2018-12-05 RX ADMIN — DIPHENHYDRAMINE HYDROCHLORIDE 25 MG: 25 CAPSULE ORAL at 06:12

## 2018-12-05 RX ADMIN — DIPHENHYDRAMINE HYDROCHLORIDE 25 MG: 25 CAPSULE ORAL at 10:12

## 2018-12-05 RX ADMIN — OXYCODONE HYDROCHLORIDE AND ACETAMINOPHEN 1 TABLET: 10; 325 TABLET ORAL at 05:12

## 2018-12-05 RX ADMIN — OXYCODONE HYDROCHLORIDE AND ACETAMINOPHEN 1 TABLET: 10; 325 TABLET ORAL at 01:12

## 2018-12-05 RX ADMIN — MISOPROSTOL 400 MCG: 200 TABLET ORAL at 10:12

## 2018-12-05 RX ADMIN — OXYCODONE HYDROCHLORIDE AND ACETAMINOPHEN 1 TABLET: 10; 325 TABLET ORAL at 02:12

## 2018-12-05 RX ADMIN — OXYCODONE HYDROCHLORIDE AND ACETAMINOPHEN 1 TABLET: 10; 325 TABLET ORAL at 06:12

## 2018-12-05 RX ADMIN — Medication 41.65 MILLI-UNITS/MIN: at 07:12

## 2018-12-05 RX ADMIN — Medication 20 UNITS: at 12:12

## 2018-12-05 RX ADMIN — DOCUSATE SODIUM 200 MG: 100 CAPSULE, LIQUID FILLED ORAL at 08:12

## 2018-12-05 RX ADMIN — DIPHENHYDRAMINE HYDROCHLORIDE 25 MG: 25 CAPSULE ORAL at 04:12

## 2018-12-05 RX ADMIN — MUPIROCIN 1 G: 20 OINTMENT TOPICAL at 08:12

## 2018-12-05 RX ADMIN — CITALOPRAM HYDROBROMIDE 20 MG: 20 TABLET ORAL at 11:12

## 2018-12-05 RX ADMIN — HUMAN RHO(D) IMMUNE GLOBULIN 300 MCG: 300 INJECTION, SOLUTION INTRAMUSCULAR at 10:12

## 2018-12-05 NOTE — PROGRESS NOTES
"Bringing infant back to room. Upon entering, LAURIE Cosby at bedside and reports patient spouse called regarding patient bleeding upon walking to bathroom to void. Patient presently in bed shaking. States "cold."  Significant other asleep at bedside. Infant taken back out to [facility] by Ms. Sánchez.    2336 - 600mcg Cytotec and bolus of Pitocin given by HIWOT Romero    2338 - Bean cath inserted. Per LAURIE Cosby, RN    2340 - JASMIN Solitario phone Dr. Arambula and received ordered for Dilaudid 0.5mg. Given per order.    2350 - LAURIE Cosby called and spoke with Dr. Arambual for bedside consult    0000 - JASMIN Garrison spoke to Dr. Arambula via phone and order noted to give Hemabate now and prepare one for bedside.     0005 - First dose of hemabate given in right anterior thigh.     0021 - Dr. Arambula at bedside for exam. Orders noted for second dose of hemabate, stat CBC and ultrasound of uterus.     0027 - Hemabate given in left anterior thigh.     0041 - Lab at bedside for stat CBC    0043 - Infant brought back to room. Asleep. NADN.    0111 - Ultrasound performed at bedside. Dr. Arambula present. Orders noted to continue Pitocin and stop Toradol.     0148 - Percocet 10/325 given for c/o pain 10/10.     0235 - Patient states pain better 2 6/10. Fundus firm, midline and at umbilicus. Light bleeding noted on peripard. No clots. Infant placed in patient's arm and placed in crib at bedside per patient request. Given warm blanket. NADN noted. Instructed patient to call with any needs. Understanding voiced. Will continue to monitor.   Vital signs assessed throughout. See chart.       "

## 2018-12-05 NOTE — PHYSICIAN QUERY
"PT Name: Darlin Silva  MR #: [id number]    Physician Query Form - Hematology Clarification      CDS/: JOSÉ MIGUEL Rivera,[email or web address]           Contact information:[email or web address]    This form is a permanent document in the medical record.      Query Date: 2018    By submitting this query, we are merely seeking further clarification of documentation. Please utilize your independent clinical judgment when addressing the question(s) below.    The Medical record contains the following:   Indicators  Supporting Clinical Findings Location in Medical Record   X "Anemia" documented Anemia Anesthesia note    X H & H = Hgb=10.1-12.1  Hct=30.2-36.3 LAB -    BP =                     HR=      "GI bleeding" documented     X Acute bleeding (Non GI site) Called emergently to the hospital due to late post partum hemorrhage.     OB Progress note @1231am    Transfusion(s)     X Treatment: Will give another hemabate.  Will order an u/s.  A cavity that is distended with clot should be able to be appreciated by u/s.       Will also get stat CBC    EBL: 650 cc   OB Progress note @1231am              Op note    X Other:  Procedure:  Repeat Low Transverse  Section with 2 layer closure   Op note      Provider, please specify diagnosis or diagnoses associated with above clinical findings.    [  ] Acute blood loss anemia expected post-operatively   [ x ] Acute blood loss anemia     [  ] Other Hematological Diagnosis (please specify):     [  ] Clinically Undetermined       Please document in your progress notes daily for the duration of treatment, until resolved, and include in your discharge summary.                                                                                                      "

## 2018-12-05 NOTE — PROGRESS NOTES
Called emergently to the hospital due to late post partum hemorrhage.      Nursing had massaged a few clots, but was concerned about a remaining large collection.      She has received Cytotec.  I ordered Hemabate while I was en route to the hospital.      On my exam cervix feels closed.  Fundus may be slightly enlarged.  Does not feel as though her uterus is hugely distended with clot from atony.      She is no longer bleeding.      Will give another hemabate.  Will order an u/s.  A cavity that is distended with clot should be able to be appreciated by u/s.      Will also get stat CBC.  She is hemodynamically stable.     Chad Arambula MD

## 2018-12-05 NOTE — PLAN OF CARE
Problem: Patient Care Overview  Goal: Individualization & Mutuality  Outcome: Ongoing (interventions implemented as appropriate)  VSS. Afebrile. FF at umbilicus. Bleeding light. No clots noted. Bean cath remains in place and draining CYU. Repeat CBC and Rhogam w/u this AM. Pain managed with PRN Percocet. Given Benadryl PRN for itching on palms and feet. LTV incision with aquacel dressing c/d/i. RH IV patent and infusing w LR and Pit. LAC IV patent and SL. POC discussed and understanding voiced.

## 2018-12-05 NOTE — PLAN OF CARE
Problem: Patient Care Overview  Goal: Plan of Care Review  Outcome: Ongoing (interventions implemented as appropriate)  POC discussed and understanding voiced.

## 2018-12-05 NOTE — PROGRESS NOTES
PCA and Bean taken down.  Pt tolerated well and verbalized understanding of asking for pain medication when needed and request for assistance to restroom.  No complaints.  No signs of distress.

## 2018-12-05 NOTE — PROGRESS NOTES
Pt sitting up in bed.  Pt smiling and talking to family.  Pt states her pain gets up to 8 when coughing or moving.  No signs of distress noted.

## 2018-12-05 NOTE — PROGRESS NOTES
Patient complained that her pain is not being managed with the Percocet 10. Spoke with Dr. Hathaway and advised him that the patient received one dose of toradol yesterday, but her midnight and 6am dose was not given due to her bleeding issues. Per Dr. Hathaway, okay to resume toradol as ordered for the patient.

## 2018-12-05 NOTE — PLAN OF CARE
Problem: Postpartum ( Delivery) (Adult,Obstetrics,Pediatric)  Goal: Signs and Symptoms of Listed Potential Problems Will be Absent, Minimized or Managed (Postpartum)  Signs and symptoms of listed potential problems will be absent, minimized or managed by discharge/transition of care (reference Postpartum ( Delivery) (Adult,Obstetrics,Pediatric) CPG).  Outcome: Ongoing (interventions implemented as appropriate)  FF at umbilicus. Bleeding light. Bean cath in place and draining c/y/u/ NADN

## 2018-12-05 NOTE — PROGRESS NOTES
Notified Dr. Hathaway that the patient is asking for her home medication Celexa and Ambien to be ordered while she is inpatient. Advised Dr. Hathaway that the patient has been taking percocet 10s and ambien. Patient has also decided not to breastfeed and formula feed her  only. TORB to administer citalopram 20 mg daily PO. Per Dr. Hathaway, no ambien ordered because patient is regularly taking percocet 10s for pain and benadryl for itching per patient report.

## 2018-12-05 NOTE — PLAN OF CARE
Problem: Breastfeeding (Adult,Obstetrics,Pediatric)  Goal: Signs and Symptoms of Listed Potential Problems Will be Absent, Minimized or Managed (Breastfeeding)  Signs and symptoms of listed potential problems will be absent, minimized or managed by discharge/transition of care (reference Breastfeeding (Adult,Obstetrics,Pediatric) CPG).  Outcome: Ongoing (interventions implemented as appropriate)  Breastfeeding ad dionne and supplementing with formula PRN as needed.

## 2018-12-05 NOTE — PROGRESS NOTES
Postop day 1  Patient doing well, no complaints.  Tolerating diet  Had hemorrhage overnight - reports no heavy bleeding now  Temp:  [96.1 °F (35.6 °C)-98.5 °F (36.9 °C)]   Pulse:  []   Resp:  [14-20]   BP: (119-167)/()   SpO2:  [95 %-100 %]   Abd soft nondistended fundus firm and nontender  Incision clean, dry, intact    Recent Labs   Lab 12/05/18  0643   WBC 8.77   HGB 10.1*   HCT 30.2*          A&P  Post op doing well.  Routine post op care.  hct stable  D/c fierro and ambulate

## 2018-12-05 NOTE — PROGRESS NOTES
Bleeding has stopped.      Cavity is collapsed, per the sagittal views I saw while the official u/s was being performed.      H/H is stable.      Will monitor.      I think the worst is over and she will remain stable.  Continue pitocin.  Stop Toradol.      Chad Arambula MD

## 2018-12-06 VITALS
BODY MASS INDEX: 38.95 KG/M2 | HEIGHT: 69 IN | RESPIRATION RATE: 19 BRPM | HEART RATE: 92 BPM | SYSTOLIC BLOOD PRESSURE: 143 MMHG | WEIGHT: 263 LBS | TEMPERATURE: 97 F | DIASTOLIC BLOOD PRESSURE: 81 MMHG | OXYGEN SATURATION: 95 %

## 2018-12-06 PROCEDURE — 63600175 PHARM REV CODE 636 W HCPCS: Performed by: OBSTETRICS & GYNECOLOGY

## 2018-12-06 PROCEDURE — 25000003 PHARM REV CODE 250: Performed by: OBSTETRICS & GYNECOLOGY

## 2018-12-06 RX ORDER — NAPROXEN 500 MG/1
500 TABLET ORAL EVERY 8 HOURS PRN
Qty: 90 TABLET | Refills: 1 | Status: SHIPPED | OUTPATIENT
Start: 2018-12-06 | End: 2019-11-21

## 2018-12-06 RX ORDER — IBUPROFEN 400 MG/1
800 TABLET ORAL 3 TIMES DAILY
Status: DISCONTINUED | OUTPATIENT
Start: 2018-12-06 | End: 2018-12-06 | Stop reason: HOSPADM

## 2018-12-06 RX ADMIN — DIPHENHYDRAMINE HYDROCHLORIDE 25 MG: 25 CAPSULE ORAL at 08:12

## 2018-12-06 RX ADMIN — OXYCODONE HYDROCHLORIDE AND ACETAMINOPHEN 1 TABLET: 10; 325 TABLET ORAL at 10:12

## 2018-12-06 RX ADMIN — KETOROLAC TROMETHAMINE 30 MG: 30 INJECTION, SOLUTION INTRAMUSCULAR at 06:12

## 2018-12-06 RX ADMIN — OXYCODONE HYDROCHLORIDE AND ACETAMINOPHEN 1 TABLET: 10; 325 TABLET ORAL at 02:12

## 2018-12-06 RX ADMIN — CITALOPRAM HYDROBROMIDE 20 MG: 20 TABLET ORAL at 08:12

## 2018-12-06 RX ADMIN — DOCUSATE SODIUM 200 MG: 100 CAPSULE, LIQUID FILLED ORAL at 08:12

## 2018-12-06 RX ADMIN — DIPHENHYDRAMINE HYDROCHLORIDE 25 MG: 25 CAPSULE ORAL at 04:12

## 2018-12-06 RX ADMIN — OXYCODONE HYDROCHLORIDE AND ACETAMINOPHEN 1 TABLET: 10; 325 TABLET ORAL at 06:12

## 2018-12-06 RX ADMIN — KETOROLAC TROMETHAMINE 30 MG: 30 INJECTION, SOLUTION INTRAMUSCULAR at 12:12

## 2018-12-06 RX ADMIN — MUPIROCIN 1 G: 20 OINTMENT TOPICAL at 08:12

## 2018-12-06 NOTE — PLAN OF CARE
Problem: Patient Care Overview  Goal: Plan of Care Review  Outcome: Ongoing (interventions implemented as appropriate)  Plan of care reviewed. Fundus firm.@ umbilicus. Small to light amount rubra lochia. Aquacel dressing intact to low transverse Bonding with infant. Frequent checks made to ensure safety and comfort. Bed low, call bell within reach. Will continue to monitor.

## 2018-12-06 NOTE — NURSING
"Report received from Louise PATRICIO; care assumed. AAOx3, assessment completed. C/o pain to incision, rates pain 9/10 using pain scale. Pt states she was recently walking and "did too much". Right hand #18g IV, patent to flush. Left AC #18g, patent upon flush. Both sites free of redness or swelling. Aquacel dressing present, dressing CDI. POC reviewed with pt, pt verb understanding. Call bell within reach, will monitor.   "

## 2018-12-06 NOTE — NURSING
Pt d/c'd home. D/c instructions given to pt, pt verb understanding. Percocet Rx given to pt, currently waiting for d/c order from peds.

## 2018-12-06 NOTE — CONSULTS
Consult for family assessment for strong marijuana odor in room.   SW met in room with mom, baby and 2 year old present. No odor detected at the time.     SW met wit pt at bedside. Pt doing well. Pt will be bottle feeding and is linked to [facility].   DESTINY confirmed address and phone numbers, pharmacy ([facility]). SW inquired if pt has help at home. Pt stated that she has help at home with her her mom and her younger brother. She lives with them and her children (7 yr daughter, 2 yr son and ). She informs that the children's dad will come over to help at times but does not live in the household.  SW inquired if patient feels safe in her home. Reports feels safe--no issues.   SW reiterated the importance of following up with appointments once discharged. OB follow up appointment is  per patient.  Peds is Dr Mohsen Correia. Mom reports will call tomorrow to make baby appointment.  Pt has no questions or concerns. Contact information provided.   DESTINY will assist as needed.

## 2018-12-06 NOTE — DISCHARGE SUMMARY
33 y.o.   OB History      Para Term  AB Living    4 3 3   1 3    SAB TAB Ectopic Multiple Live Births    1     0 3        admitted for Delivery. See procedure note. Postpartum course was unremarkable.     Admit date 2018  5:24 AM  D/c date 2018    Disposition: Home  Activity 6 weeks pelvic rest  Diet: normal  Discharge followup 1 week, if . Follow up in 6 weeks if vaginal delivery  Meds listed separately\

## 2018-12-06 NOTE — DISCHARGE INSTRUCTIONS
After a Cesearean Birth    General Discharge Instructions  · May follow a regular diet, unless otherwise discussed with physician.    · Take showers, not baths unless otherwise discussed with physician.    · Activity as tolerated.    · No lifting or heavy exercise for 6 weeks, no driving for 2 weeks, no sexual   intercourse, douching or tampons for 6 weeks    · May return to work/school as discussed with physician    · Discuss birth control with physician    · Take medications as directed    · Breast care support bra worn at all times    · Lactation consultant referral number ( [phone] or [phone])    Call Your Healthcare Provider Right Away If You Have:  · A temperature of 100.4°F or higher.  · If your blood pressure is over 155/105.  · You have difficulty catching your breath or trouble breathing.  · Heavy vaginal bleeding, clots, or vaginal discharge with foul odor. (heavier than menses)  · Persistent nausea or vomiting.  · You gain more than 3 pounds in 3 days.  · Severe headaches not relieved by Tylenol (acetaminophen) or Motrin (ibuprofen)  · Blurry or double vision, see spots or flashing lights.  · Dizziness or fainting.  · New onset swelling or worsening of existing swelling.  · Burning or pain when you urinate.  · No bowel movement for 5 days.  · Redness, warmth, swelling, or pain in the lower leg.  · Redness, discharge, or pain worse than you had in the hospital.  · Burning, pain, red streaks, or lumpy areas in your breasts.  · Cracks, blisters, or blood on your nipples.  · Feelings of extreme sadness or anxiety, or a feeling that you dont want to be with your baby.  · If you have any new or unusual symptoms or have questions or concerns    Some of these symptoms can occur up to 4 to 6 weeks after delivery. This can be a sign of pre-eclampsia, which is a serious disease that can cause stroke, seizures, organ damage, or death. Do not wait to call your doctor or seek medical  attention.          Incision Care  · If you have an Aquacel dressing, then it stays in place for 1 week. It is waterproof and will be removed at your post-op visit. If it comes off before then, call your physician and keep the incision clean with warm soapy water and pat dry.  · Watch your incision for signs of infection, such as increasing redness or drainage, or a foul smelling odor.  · For ease of movement, hold a pillow against the incision when you get up from a lying or sitting position, and when you laugh or cough.  · Avoid heavy lifting--nothing heavier than your baby until your doctor instructs you otherwise.     Follow-Up  Schedule a  follow-up exam with your healthcare provider for about 1 week after delivery. During this exam, your uterus and vaginal area will be checked. Contact your healthcare provider if you think you or your baby are having any problems.

## 2018-12-06 NOTE — NURSING
Dr. Doyle gave order to d/c toradol at this time and begin motrin 800 mg TID, MD also gave order for social work consult due to suspected drug abuse (strong marijuana odor in room).

## 2018-12-06 NOTE — PLAN OF CARE
Problem: Patient Care Overview  Goal: Plan of Care Review  Outcome: Ongoing (interventions implemented as appropriate)  Patient alert and oriented with even & unlabored respirations. Pain managed with scheduled toradol and PRN pain medicine. POC discussed with the patient, and the patient verbalized understanding.

## 2018-12-06 NOTE — PROGRESS NOTES
Postop day 2  Patient doing well, no complaints. Ambulating. Lochia minimal,  denies n/v, denies h/a, vision changes, upper abd pain, chest pain, sob. Pain is controlled. Tolerating diet, PASSING flatus    Patient Active Problem List   Diagnosis    S/P gastric bypass    Depression with anxiety    Opioid dependence - off suboxone 2016, but +UDS oxycodone 3/30/16, NEG 2016, NEG 6/15, neg , +tramadol 18    Sedative hypnotic or anxiolytic dependence    +THC on UTOX at NOB.  5/10/18    Tobacco use disorder     GERD (gastroesophageal reflux disease)    H/O:     Rh negative state in antepartum period    Drug-seeking behavior    Endometriosis, site unspecified    History of prior pregnancy with IUGR     KIRSTIN (iron deficiency anemia)    Vitamin D deficiency    Prenatal care, subsequent pregnancy, third trimester       Temp:  [97 °F (36.1 °C)-98.1 °F (36.7 °C)] 97.3 °F (36.3 °C)  Pulse:  [] 81  Resp:  [18-20] 18  BP: (125-137)/(64-97) 126/74  Gen: Alert, orientated  CV: Regular rate  Normal resp effort  Abd: soft nondistended fundus firm and appropriately tender  Incision: clean, dry, intact; No erythema  Ext: no significant edema; nontender calves    Recent Labs   Lab 18  0643   WBC 8.77   HGB 10.1*   HCT 30.2*      GROUPTRH O NEG       A&P  33 y.o.  s/p c/d  Post op doing well.  Routine post op care.  H/o gastric bipass- has taken ibu's without difficulty in the past.  Drug seek behav and h/o perc abuse- giving Perc #21 tabs for pain. Will monitor.  THC strong odor in room, per multiple RN's- SS consult.   S/p rhogam pp.

## 2018-12-11 NOTE — ANESTHESIA POSTPROCEDURE EVALUATION
"Anesthesia Post Evaluation    Patient: Darlin Silva    Procedure(s) Performed: Procedure(s) (LRB):   SECTION (N/A)    Final Anesthesia Type: spinal  Patient location during evaluation: labor & delivery  Patient participation: Yes- Able to Participate  Level of consciousness: awake and alert, awake and oriented  Post-procedure vital signs: reviewed and stable  Pain management: adequate  Airway patency: patent  PONV status at discharge: No PONV  Anesthetic complications: no      Cardiovascular status: blood pressure returned to baseline, hemodynamically stable and stable  Respiratory status: unassisted  Hydration status: euvolemic  Follow-up not needed.        Visit Vitals  BP (!) 143/81 (BP Location: Left arm, Patient Position: Sitting)   Pulse 92   Temp 36.2 °C (97.2 °F) (Oral)   Resp 19   Ht 5' 9" (1.753 m)   Wt 119.3 kg (263 lb)   LMP 2018   SpO2 95%   Breastfeeding? Yes   BMI 38.84 kg/m²       Pain/Perfecto Score: No Data Recorded      "

## 2018-12-11 NOTE — ANESTHESIA PROCEDURE NOTES
Spinal    Diagnosis:  Section  Patient location during procedure: OR  Start time: 12/10/2018 8:00 AM  Timeout: 12/10/2018 7:58 AM  End time: 12/10/2018 8:08 AM  Staffing  Anesthesiologist: Jovon Chávez MD  Performed: anesthesiologist   Preanesthetic Checklist  Completed: patient identified, site marked, surgical consent, pre-op evaluation, timeout performed, IV checked, risks and benefits discussed and monitors and equipment checked  Spinal Block  Patient position: sitting  Prep: ChloraPrep  Patient monitoring: heart rate, cardiac monitor, continuous pulse ox, continuous capnometry and frequent blood pressure checks  Approach: midline  Location: L3-4  Injection technique: single shot  CSF Fluid: clear free-flowing CSF  Needle  Needle type: pencil-tip   Needle gauge: 25 G  Needle length: 3.5 in  Additional Documentation: negative aspiration for heme and no paresthesia on injection  Needle localization: anatomical landmarks  Assessment  Sensory level: T5   Dermatomal levels determined by alcohol wipe  Ease of block: easy  Patient's tolerance of the procedure: comfortable throughout block and no complaints

## 2018-12-12 PROBLEM — D50.9 IDA (IRON DEFICIENCY ANEMIA): Status: RESOLVED | Noted: 2018-11-30 | Resolved: 2018-12-12

## 2018-12-12 PROCEDURE — 96374 THER/PROPH/DIAG INJ IV PUSH: CPT

## 2018-12-12 PROCEDURE — 99285 EMERGENCY DEPT VISIT HI MDM: CPT | Mod: 25

## 2018-12-13 ENCOUNTER — HOSPITAL ENCOUNTER (OUTPATIENT)
Facility: HOSPITAL | Age: 33
Discharge: HOME | End: 2018-12-13
Attending: EMERGENCY MEDICINE | Admitting: OBSTETRICS & GYNECOLOGY
Payer: MEDICAID

## 2018-12-13 VITALS
RESPIRATION RATE: 15 BRPM | DIASTOLIC BLOOD PRESSURE: 74 MMHG | HEIGHT: 69 IN | SYSTOLIC BLOOD PRESSURE: 143 MMHG | BODY MASS INDEX: 38.36 KG/M2 | WEIGHT: 259 LBS | OXYGEN SATURATION: 93 % | TEMPERATURE: 98 F | HEART RATE: 62 BPM

## 2018-12-13 DIAGNOSIS — R00.0 TACHYCARDIA: ICD-10-CM

## 2018-12-13 DIAGNOSIS — I83.893 VARICOSE VEINS OF LEG WITH SWELLING, BILATERAL: ICD-10-CM

## 2018-12-13 PROBLEM — O14.90 PREECLAMPSIA: Status: ACTIVE | Noted: 2018-12-13

## 2018-12-13 LAB
ALBUMIN SERPL BCP-MCNC: 3.1 G/DL
ALP SERPL-CCNC: 74 U/L
ALT SERPL W/O P-5'-P-CCNC: 14 U/L
ANION GAP SERPL CALC-SCNC: 7 MMOL/L
AST SERPL-CCNC: 19 U/L
BASOPHILS # BLD AUTO: 0.02 K/UL
BASOPHILS NFR BLD: 0.3 %
BILIRUB SERPL-MCNC: 0.2 MG/DL
BILIRUB UR QL STRIP: NEGATIVE
BNP SERPL-MCNC: 92 PG/ML
BUN SERPL-MCNC: 12 MG/DL
CALCIUM SERPL-MCNC: 8.6 MG/DL
CHLORIDE SERPL-SCNC: 108 MMOL/L
CLARITY UR: CLEAR
CO2 SERPL-SCNC: 24 MMOL/L
COLOR UR: COLORLESS
CREAT SERPL-MCNC: 0.7 MG/DL
DIFFERENTIAL METHOD: ABNORMAL
EOSINOPHIL # BLD AUTO: 0.1 K/UL
EOSINOPHIL NFR BLD: 1.9 %
ERYTHROCYTE [DISTWIDTH] IN BLOOD BY AUTOMATED COUNT: 13 %
EST. GFR  (AFRICAN AMERICAN): >60 ML/MIN/1.73 M^2
EST. GFR  (NON AFRICAN AMERICAN): >60 ML/MIN/1.73 M^2
GLUCOSE SERPL-MCNC: 86 MG/DL
GLUCOSE UR QL STRIP: NEGATIVE
HCT VFR BLD AUTO: 33.5 %
HGB BLD-MCNC: 11 G/DL
HGB UR QL STRIP: ABNORMAL
KETONES UR QL STRIP: NEGATIVE
LEUKOCYTE ESTERASE UR QL STRIP: NEGATIVE
LYMPHOCYTES # BLD AUTO: 2.4 K/UL
LYMPHOCYTES NFR BLD: 37.5 %
MAGNESIUM SERPL-MCNC: 1.7 MG/DL
MCH RBC QN AUTO: 28.1 PG
MCHC RBC AUTO-ENTMCNC: 32.8 G/DL
MCV RBC AUTO: 86 FL
MICROSCOPIC COMMENT: NORMAL
MONOCYTES # BLD AUTO: 0.5 K/UL
MONOCYTES NFR BLD: 7.2 %
NEUTROPHILS # BLD AUTO: 3.4 K/UL
NEUTROPHILS NFR BLD: 53.1 %
NITRITE UR QL STRIP: NEGATIVE
PH UR STRIP: 6 [PH] (ref 5–8)
PLATELET # BLD AUTO: 412 K/UL
PMV BLD AUTO: 8.9 FL
POTASSIUM SERPL-SCNC: 3.4 MMOL/L
PROT SERPL-MCNC: 7.1 G/DL
PROT UR QL STRIP: NEGATIVE
RBC # BLD AUTO: 3.91 M/UL
RBC #/AREA URNS HPF: 0 /HPF (ref 0–4)
SODIUM SERPL-SCNC: 139 MMOL/L
SP GR UR STRIP: 1 (ref 1–1.03)
SQUAMOUS #/AREA URNS HPF: NORMAL /HPF
URN SPEC COLLECT METH UR: ABNORMAL
UROBILINOGEN UR STRIP-ACNC: NEGATIVE EU/DL
WBC # BLD AUTO: 6.35 K/UL
WBC #/AREA URNS HPF: 0 /HPF (ref 0–5)

## 2018-12-13 PROCEDURE — 63600175 PHARM REV CODE 636 W HCPCS: Performed by: EMERGENCY MEDICINE

## 2018-12-13 PROCEDURE — 85025 COMPLETE CBC W/AUTO DIFF WBC: CPT

## 2018-12-13 PROCEDURE — 80053 COMPREHEN METABOLIC PANEL: CPT

## 2018-12-13 PROCEDURE — 83880 ASSAY OF NATRIURETIC PEPTIDE: CPT

## 2018-12-13 PROCEDURE — 83735 ASSAY OF MAGNESIUM: CPT

## 2018-12-13 PROCEDURE — 99211 OFF/OP EST MAY X REQ PHY/QHP: CPT | Mod: 25

## 2018-12-13 PROCEDURE — 81000 URINALYSIS NONAUTO W/SCOPE: CPT

## 2018-12-13 PROCEDURE — 93010 ELECTROCARDIOGRAM REPORT: CPT | Mod: ,,, | Performed by: INTERNAL MEDICINE

## 2018-12-13 PROCEDURE — 25000003 PHARM REV CODE 250: Performed by: EMERGENCY MEDICINE

## 2018-12-13 PROCEDURE — G0378 HOSPITAL OBSERVATION PER HR: HCPCS

## 2018-12-13 PROCEDURE — 93005 ELECTROCARDIOGRAM TRACING: CPT

## 2018-12-13 RX ORDER — OXYCODONE AND ACETAMINOPHEN 5; 325 MG/1; MG/1
1 TABLET ORAL
Status: COMPLETED | OUTPATIENT
Start: 2018-12-13 | End: 2018-12-13

## 2018-12-13 RX ORDER — MORPHINE SULFATE 4 MG/ML
4 INJECTION, SOLUTION INTRAMUSCULAR; INTRAVENOUS
Status: COMPLETED | OUTPATIENT
Start: 2018-12-13 | End: 2018-12-13

## 2018-12-13 RX ADMIN — MORPHINE SULFATE 4 MG: 4 INJECTION INTRAVENOUS at 04:12

## 2018-12-13 RX ADMIN — OXYCODONE AND ACETAMINOPHEN 1 TABLET: 5; 325 TABLET ORAL at 03:12
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
## 2018-12-13 NOTE — PROGRESS NOTES
To room with Dr. Hathaway, plan of care discussed at length, leg edema viewed, patient would like to go home now.... Plan to manage pain at home discussed at length.... Patient will be discharged home, discharge orders given

## 2018-12-13 NOTE — PROGRESS NOTES
Patient given discharge instructions, iv removed, pressure dressing applied. Patient eating breakfast, pre  E s/s discussed at length.... Patient verb understanding of plan of care. Will let me know when her ride gets here.

## 2018-12-13 NOTE — DISCHARGE INSTRUCTIONS
Understanding Preeclampsia  Preeclampsia is pregnancy-related high blood pressure that develops after 20 weeks' gestation. It can lead to health risks for you and your baby. No one knows what causes preeclampsia. But it is known that the only cure is delivery.    Signs and symptoms  A common sign of preeclampsia is high blood pressure. Other signs and symptoms may include:  · Rapid weight gain (more than 5 pounds in a week)  · Feeling nauseous; throwing up  · Protein in your urine  · Headache  · Stomach pain   · Vision problems (seeing flashes or spots)  · Edema (swelling) in your face or hands (this also commonly happens near the end of normal pregnancies, even without preeclampsia)    When to call your healthcare provider  Call your healthcare provider if swelling, weight gain, or other symptoms come on quickly or are severe. Some cases of preeclampsia are more severe than others. Your signs and symptoms also may change or worsen as you get closer to your due date.    Dangers of preeclampsia  If not treated, preeclampsia can cause problems for you and your baby. The placenta (organ that nourishes your baby) may tear away from the uterine wall. This can lead to fetal distress (the baby is at risk for health problems) and premature delivery. Preeclampsia can also cause these health problems:  · Kidney failure or other organ damage  · Seizures  · Stroke  · Death  · Premature delivery              Home Undelivered Discharge Instructions    After Discharge Orders:    Future Appointments   Date Time Provider Department Center   1/16/2019  9:30 AM Aron Doyle MD [facility]       Call physician for any problems    Current Discharge Medication List      CONTINUE these medications which have NOT CHANGED    Details   citalopram (CELEXA) 20 MG tablet Take 20 mg by mouth every morning.  Refills: 2      ferrous sulfate (FEOSOL) 325 mg (65 mg iron) Tab tablet TAKE 1 TABLET BY MOUTH TWICE A DAY  Qty: 60 tablet, Refills:  1      naproxen (NAPROSYN) 500 MG tablet Take 1 tablet (500 mg total) by mouth every 8 (eight) hours as needed (cramping or mild pain 1-3/10 scale).  Qty: 90 tablet, Refills: 1      prenatal vit,tanya 74-iron-folic (PRENAPLUS) 27 mg iron- 1 mg Tab Take 1 tablet by mouth once daily.  Qty: 30 tablet, Refills: 11    Comments: May substitute any PNV that is covered.  You may have to enter several different ones to find out which one will work.  Do not submit denial or prior authorization.      zolpidem (AMBIEN) 10 mg Tab Take 10 mg by mouth every evening.                     · Diet:  normal diet as tolerated    · Rest: normal activity as tolerated    Other instructions: Do kick counts once a day on your baby. Choose the time of day your baby is most active. Get in a comfortable lying or sitting position and time how long it takes to feel 10 kicks, twists, turns, swishes, or rolls. Call your physician or midwife if there have not been 10 kicks in 1 hours    Call physician or midwife, return to Labor and Delivery, call 911, or go to the nearest Emergency Room if: increased leakage or fluid, contractions more than  5 per  1 hour, decreased fetal movement, persistent low back pain or cramping, bleeding from vaginal area, difficulty urinating, pain with urination, difficulty breathing or new calf pain

## 2018-12-13 NOTE — TREATMENT PLAN
"Pt arrived to unit.  Placed on pulse ox and BP monitor.      She reports having increased swelling and pain specifically in her ankles.  She states it has gotten worse since she has been home from the hospital. She does have 3+ pitting edema to BLE.     She states "the doctor downstairs says I might have pre eclampsia".  Pt denies any h/o HTN or pre eclampsia in the past.    Looked at pt's incision.  Looks to be healing well.  No redness or irritation noted.      Pt states she got some morphine downstairs and now is itching. She wants something for the itching.     Pt also requesting crackers.    "

## 2018-12-13 NOTE — ED TRIAGE NOTES
Pt reports c section on 12/4.  Reports lower leg swelling bilaterally and abdominal pain.  No relief with Naproxen and Percocet.

## 2019-11-21 PROBLEM — Z76.5 DRUG-SEEKING BEHAVIOR: Status: RESOLVED | Noted: 2017-02-23 | Resolved: 2019-11-21

## 2020-02-26 ENCOUNTER — HOSPITAL ENCOUNTER (EMERGENCY)
Facility: HOSPITAL | Age: 35
Discharge: HOME | End: 2020-02-26
Attending: EMERGENCY MEDICINE
Payer: MEDICAID

## 2020-02-26 VITALS
RESPIRATION RATE: 18 BRPM | TEMPERATURE: 98 F | HEART RATE: 76 BPM | SYSTOLIC BLOOD PRESSURE: 141 MMHG | DIASTOLIC BLOOD PRESSURE: 65 MMHG | BODY MASS INDEX: 34.8 KG/M2 | WEIGHT: 235 LBS | HEIGHT: 69 IN | OXYGEN SATURATION: 98 %

## 2020-02-26 DIAGNOSIS — S61.411A LACERATION OF RIGHT HAND WITHOUT FOREIGN BODY, INITIAL ENCOUNTER: Primary | ICD-10-CM

## 2020-02-26 PROCEDURE — 99283 EMERGENCY DEPT VISIT LOW MDM: CPT | Mod: 25

## 2020-02-26 PROCEDURE — 25000003 PHARM REV CODE 250: Performed by: PHYSICIAN ASSISTANT

## 2020-02-26 PROCEDURE — 12001 RPR S/N/AX/GEN/TRNK 2.5CM/<: CPT

## 2020-02-26 RX ORDER — LIDOCAINE HYDROCHLORIDE 10 MG/ML
10 INJECTION INFILTRATION; PERINEURAL
Status: COMPLETED | OUTPATIENT
Start: 2020-02-26 | End: 2020-02-26

## 2020-02-26 RX ORDER — MUPIROCIN 20 MG/G
OINTMENT TOPICAL 3 TIMES DAILY
Qty: 15 G | Refills: 0 | Status: ON HOLD | OUTPATIENT
Start: 2020-02-26 | End: 2020-07-08 | Stop reason: CLARIF

## 2020-02-26 RX ORDER — BACITRACIN 500 [USP'U]/G
1 OINTMENT TOPICAL
Status: COMPLETED | OUTPATIENT
Start: 2020-02-26 | End: 2020-02-26

## 2020-02-26 RX ADMIN — BACITRACIN 1 G: 500 OINTMENT TOPICAL at 07:02

## 2020-02-26 RX ADMIN — LIDOCAINE HYDROCHLORIDE 10 ML: 10 INJECTION, SOLUTION INFILTRATION; PERINEURAL at 07:02
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
## 2020-06-01 ENCOUNTER — HOSPITAL ENCOUNTER (OUTPATIENT)
Dept: OBSTETRICS AND GYNECOLOGY | Facility: HOSPITAL | Age: 35
Discharge: HOME | End: 2020-06-01
Attending: OBSTETRICS & GYNECOLOGY
Payer: MEDICAID

## 2020-06-01 DIAGNOSIS — O20.0 THREATENED ABORTION, ANTEPARTUM: ICD-10-CM

## 2020-06-01 DIAGNOSIS — O26.899 RH NEGATIVE STATE IN ANTEPARTUM PERIOD: ICD-10-CM

## 2020-06-01 DIAGNOSIS — Z67.91 RH NEGATIVE STATE IN ANTEPARTUM PERIOD: ICD-10-CM

## 2020-06-01 LAB — INJECT RH IG VOL PATIENT: NORMAL ML

## 2020-06-01 PROCEDURE — 96372 THER/PROPH/DIAG INJ SC/IM: CPT

## 2020-06-01 PROCEDURE — 63600519 RHOGAM PHARM REV CODE 636 ALT 250 W HCPCS: Performed by: OBSTETRICS & GYNECOLOGY

## 2020-06-01 RX ADMIN — HUMAN RHO(D) IMMUNE GLOBULIN 300 MCG: 300 INJECTION, SOLUTION INTRAMUSCULAR at 04:06

## 2020-06-26 ENCOUNTER — HOSPITAL ENCOUNTER (OUTPATIENT)
Facility: HOSPITAL | Age: 35
Discharge: LEFT BEFORE DISCHARGE | End: 2020-06-26
Attending: OBSTETRICS & GYNECOLOGY | Admitting: OBSTETRICS & GYNECOLOGY
Payer: MEDICAID

## 2020-06-26 LAB
RUPTURE OF MEMBRANE: NEGATIVE
SARS-COV-2 RDRP RESP QL NAA+PROBE: NEGATIVE

## 2020-06-26 PROCEDURE — 84112 EVAL AMNIOTIC FLUID PROTEIN: CPT

## 2020-06-26 PROCEDURE — U0002 COVID-19 LAB TEST NON-CDC: HCPCS

## 2020-06-26 PROCEDURE — 99211 OFF/OP EST MAY X REQ PHY/QHP: CPT

## 2020-06-26 NOTE — NURSING
0536: Pt reports to L&D with c/o NVD, abd pain, generalized weakness, since 0200. Debbi SOB. Pt endorses positive fetal movement, denies vaginal bleeding, however she states she's been leaking fluid since she woke up at 0200. SVE closed/thick/unable to palpate presenting part. Pt thrashing around in bed, asking RN if she can go to bathroom. RN educating on need for 20 minutes of fetal strip prior to using bathroom, pt continuing to move around in bed despite RN educating on need for pt to remain still. Pt states her abdominal pain is 6/10, abd soft on palpation, nontender.    0545: Based on sx upon arrival, RN awaiting N95 for proper PPE prior to entering pt room.    0625: RN at  collecting ROM+, covid swab, pt unable to provide UDS at this time. RN educating pt that it is imperative that the fetus is monitored, pt refusing to be on monitor at this time. RN to send labs collected.    0629: Dr Bernal updated on pt status. RN given orders for 4mg zofran PO q6hrs PRN, PO hydration. RN confirmed c readback.    0633: Pt requesting to leave unit AMA, RN discussing potential complications with pt, verbalized understanding. Pt signed AMA form.    0635: Pt ambulated off unit with all personal belongings.
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
## 2020-07-07 ENCOUNTER — HOSPITAL ENCOUNTER (INPATIENT)
Facility: HOSPITAL | Age: 35
LOS: 3 days | Discharge: HOME | End: 2020-07-10
Attending: OBSTETRICS & GYNECOLOGY | Admitting: OBSTETRICS & GYNECOLOGY
Payer: MEDICAID

## 2020-07-07 ENCOUNTER — ANESTHESIA (OUTPATIENT)
Dept: OBSTETRICS AND GYNECOLOGY | Facility: HOSPITAL | Age: 35
End: 2020-07-07
Payer: MEDICAID

## 2020-07-07 ENCOUNTER — ANESTHESIA EVENT (OUTPATIENT)
Dept: OBSTETRICS AND GYNECOLOGY | Facility: HOSPITAL | Age: 35
End: 2020-07-07
Payer: MEDICAID

## 2020-07-07 DIAGNOSIS — F12.10 MILD TETRAHYDROCANNABINOL (THC) ABUSE: ICD-10-CM

## 2020-07-07 DIAGNOSIS — R41.9 LOSS OF ALERTNESS: ICD-10-CM

## 2020-07-07 DIAGNOSIS — Z34.90 PREGNANCY: Primary | ICD-10-CM

## 2020-07-07 DIAGNOSIS — F11.20 UNCOMPLICATED OPIOID DEPENDENCE: Chronic | ICD-10-CM

## 2020-07-07 DIAGNOSIS — F13.20 SEDATIVE HYPNOTIC OR ANXIOLYTIC DEPENDENCE: Chronic | ICD-10-CM

## 2020-07-07 LAB
ABO + RH BLD: NORMAL
AMPHET+METHAMPHET UR QL: NEGATIVE
BACTERIA #/AREA URNS HPF: ABNORMAL /HPF
BARBITURATES UR QL SCN>200 NG/ML: NEGATIVE
BASOPHILS # BLD AUTO: 0.02 K/UL (ref 0–0.2)
BASOPHILS NFR BLD: 0.3 % (ref 0–1.9)
BENZODIAZ UR QL SCN>200 NG/ML: NEGATIVE
BILIRUB UR QL STRIP: NEGATIVE
BLD GP AB SCN CELLS X3 SERPL QL: NORMAL
BLOOD GROUP ANTIBODIES SERPL: NORMAL
BZE UR QL SCN: NEGATIVE
CANNABINOIDS UR QL SCN: NORMAL
CLARITY UR: CLEAR
COLOR UR: YELLOW
CREAT UR-MCNC: 107.6 MG/DL (ref 15–325)
DIFFERENTIAL METHOD: ABNORMAL
EOSINOPHIL # BLD AUTO: 0 K/UL (ref 0–0.5)
EOSINOPHIL NFR BLD: 0.6 % (ref 0–8)
ERYTHROCYTE [DISTWIDTH] IN BLOOD BY AUTOMATED COUNT: 12.8 % (ref 11.5–14.5)
GLUCOSE UR QL STRIP: NEGATIVE
HCT VFR BLD AUTO: 34.7 % (ref 37–48.5)
HGB BLD-MCNC: 11.3 G/DL (ref 12–16)
HGB UR QL STRIP: NEGATIVE
IMM GRANULOCYTES # BLD AUTO: 0.04 K/UL (ref 0–0.04)
IMM GRANULOCYTES NFR BLD AUTO: 0.6 % (ref 0–0.5)
KETONES UR QL STRIP: ABNORMAL
LEUKOCYTE ESTERASE UR QL STRIP: ABNORMAL
LYMPHOCYTES # BLD AUTO: 1.9 K/UL (ref 1–4.8)
LYMPHOCYTES NFR BLD: 27.8 % (ref 18–48)
MCH RBC QN AUTO: 27.4 PG (ref 27–31)
MCHC RBC AUTO-ENTMCNC: 32.6 G/DL (ref 32–36)
MCV RBC AUTO: 84 FL (ref 82–98)
METHADONE UR QL SCN>300 NG/ML: NEGATIVE
MICROSCOPIC COMMENT: ABNORMAL
MONOCYTES # BLD AUTO: 0.5 K/UL (ref 0.3–1)
MONOCYTES NFR BLD: 7.1 % (ref 4–15)
NEUTROPHILS # BLD AUTO: 4.4 K/UL (ref 1.8–7.7)
NEUTROPHILS NFR BLD: 63.6 % (ref 38–73)
NITRITE UR QL STRIP: NEGATIVE
NRBC BLD-RTO: 0 /100 WBC
OPIATES UR QL SCN: NEGATIVE
PCP UR QL SCN>25 NG/ML: NEGATIVE
PH UR STRIP: 7 [PH] (ref 5–8)
PLATELET # BLD AUTO: 194 K/UL (ref 150–350)
PMV BLD AUTO: 10.3 FL (ref 9.2–12.9)
POCT GLUCOSE: 135 MG/DL (ref 70–110)
PROT UR QL STRIP: NEGATIVE
RBC # BLD AUTO: 4.13 M/UL (ref 4–5.4)
SARS-COV-2 RDRP RESP QL NAA+PROBE: NEGATIVE
SP GR UR STRIP: 1.02 (ref 1–1.03)
TOXICOLOGY INFORMATION: NORMAL
URN SPEC COLLECT METH UR: ABNORMAL
UROBILINOGEN UR STRIP-ACNC: ABNORMAL EU/DL
WBC # BLD AUTO: 6.88 K/UL (ref 3.9–12.7)
WBC #/AREA URNS HPF: 1 /HPF (ref 0–5)

## 2020-07-07 PROCEDURE — 88302 TISSUE EXAM BY PATHOLOGIST: CPT | Mod: 59,SZN | Performed by: PATHOLOGY

## 2020-07-07 PROCEDURE — 37000009 HC ANESTHESIA EA ADD 15 MINS: Performed by: OBSTETRICS & GYNECOLOGY

## 2020-07-07 PROCEDURE — 59514 PRA REAN DELIVERY ONLY: ICD-10-PCS | Mod: ANES,,, | Performed by: ANESTHESIOLOGY

## 2020-07-07 PROCEDURE — 25000003 PHARM REV CODE 250: Performed by: OBSTETRICS & GYNECOLOGY

## 2020-07-07 PROCEDURE — 59514 PRA REAN DELIVERY ONLY: ICD-10-PCS | Mod: CRNA,,, | Performed by: REGISTERED NURSE

## 2020-07-07 PROCEDURE — 27100025 HC TUBING, SET FLUID WARMER: Performed by: ANESTHESIOLOGY

## 2020-07-07 PROCEDURE — 59514 CESAREAN DELIVERY ONLY: CPT | Mod: ANES,,, | Performed by: ANESTHESIOLOGY

## 2020-07-07 PROCEDURE — 86592 SYPHILIS TEST NON-TREP QUAL: CPT

## 2020-07-07 PROCEDURE — 63600175 PHARM REV CODE 636 W HCPCS: Performed by: ANESTHESIOLOGY

## 2020-07-07 PROCEDURE — 88302 PR  SURG PATH,LEVEL II: ICD-10-PCS | Mod: 26,,, | Performed by: PATHOLOGY

## 2020-07-07 PROCEDURE — 36000685 HC CESAREAN SECTION LEVEL I: Mod: SZN

## 2020-07-07 PROCEDURE — 59514 CESAREAN DELIVERY ONLY: CPT | Mod: CRNA,,, | Performed by: REGISTERED NURSE

## 2020-07-07 PROCEDURE — 80307 DRUG TEST PRSMV CHEM ANLYZR: CPT

## 2020-07-07 PROCEDURE — 85025 COMPLETE CBC W/AUTO DIFF WBC: CPT

## 2020-07-07 PROCEDURE — 86850 RBC ANTIBODY SCREEN: CPT

## 2020-07-07 PROCEDURE — 11000001 HC ACUTE MED/SURG PRIVATE ROOM

## 2020-07-07 PROCEDURE — 37000008 HC ANESTHESIA 1ST 15 MINUTES: Performed by: OBSTETRICS & GYNECOLOGY

## 2020-07-07 PROCEDURE — 37000009 HC ANESTHESIA EA ADD 15 MINS: Mod: SZN

## 2020-07-07 PROCEDURE — 25000003 PHARM REV CODE 250: Performed by: ANESTHESIOLOGY

## 2020-07-07 PROCEDURE — 86922 COMPATIBILITY TEST ANTIGLOB: CPT

## 2020-07-07 PROCEDURE — 63600175 PHARM REV CODE 636 W HCPCS: Performed by: OBSTETRICS & GYNECOLOGY

## 2020-07-07 PROCEDURE — 36000680 HC C/S TUBAL LIGATION LEVEL I

## 2020-07-07 PROCEDURE — U0002 COVID-19 LAB TEST NON-CDC: HCPCS

## 2020-07-07 PROCEDURE — 51702 INSERT TEMP BLADDER CATH: CPT

## 2020-07-07 PROCEDURE — 80348 DRUG SCREENING BUPRENORPHINE: CPT

## 2020-07-07 PROCEDURE — 86870 RBC ANTIBODY IDENTIFICATION: CPT

## 2020-07-07 PROCEDURE — 27200918 HC ALEXIS O RING

## 2020-07-07 PROCEDURE — 88302 TISSUE EXAM BY PATHOLOGIST: CPT | Mod: 26,,, | Performed by: PATHOLOGY

## 2020-07-07 PROCEDURE — 81000 URINALYSIS NONAUTO W/SCOPE: CPT | Mod: 59

## 2020-07-07 PROCEDURE — 36000685 HC CESAREAN SECTION LEVEL I

## 2020-07-07 PROCEDURE — S0028 INJECTION, FAMOTIDINE, 20 MG: HCPCS | Performed by: ANESTHESIOLOGY

## 2020-07-07 PROCEDURE — 63600175 PHARM REV CODE 636 W HCPCS: Performed by: REGISTERED NURSE

## 2020-07-07 RX ORDER — FENTANYL CITRATE 50 UG/ML
INJECTION, SOLUTION INTRAMUSCULAR; INTRAVENOUS
Status: DISCONTINUED | OUTPATIENT
Start: 2020-07-07 | End: 2020-07-07

## 2020-07-07 RX ORDER — PHENYLEPHRINE HYDROCHLORIDE 10 MG/ML
INJECTION INTRAVENOUS
Status: DISCONTINUED | OUTPATIENT
Start: 2020-07-07 | End: 2020-07-07

## 2020-07-07 RX ORDER — CITALOPRAM 20 MG/1
20 TABLET, FILM COATED ORAL DAILY
Status: DISCONTINUED | OUTPATIENT
Start: 2020-07-07 | End: 2020-07-10 | Stop reason: HOSPADM

## 2020-07-07 RX ORDER — DOCUSATE SODIUM 100 MG/1
200 CAPSULE, LIQUID FILLED ORAL 2 TIMES DAILY
Status: DISCONTINUED | OUTPATIENT
Start: 2020-07-07 | End: 2020-07-10 | Stop reason: HOSPADM

## 2020-07-07 RX ORDER — SODIUM CITRATE AND CITRIC ACID MONOHYDRATE 334; 500 MG/5ML; MG/5ML
30 SOLUTION ORAL ONCE
Status: COMPLETED | OUTPATIENT
Start: 2020-07-07 | End: 2020-07-07

## 2020-07-07 RX ORDER — MUPIROCIN 20 MG/G
OINTMENT TOPICAL 2 TIMES DAILY
Status: DISCONTINUED | OUTPATIENT
Start: 2020-07-07 | End: 2020-07-10 | Stop reason: HOSPADM

## 2020-07-07 RX ORDER — BISACODYL 10 MG
10 SUPPOSITORY, RECTAL RECTAL ONCE AS NEEDED
Status: DISCONTINUED | OUTPATIENT
Start: 2020-07-07 | End: 2020-07-10 | Stop reason: HOSPADM

## 2020-07-07 RX ORDER — OXYCODONE AND ACETAMINOPHEN 5; 325 MG/1; MG/1
1 TABLET ORAL EVERY 4 HOURS PRN
Status: DISCONTINUED | OUTPATIENT
Start: 2020-07-07 | End: 2020-07-08

## 2020-07-07 RX ORDER — ONDANSETRON 2 MG/ML
INJECTION INTRAMUSCULAR; INTRAVENOUS
Status: DISCONTINUED | OUTPATIENT
Start: 2020-07-07 | End: 2020-07-07

## 2020-07-07 RX ORDER — SIMETHICONE 80 MG
1 TABLET,CHEWABLE ORAL EVERY 6 HOURS PRN
Status: DISCONTINUED | OUTPATIENT
Start: 2020-07-07 | End: 2020-07-10 | Stop reason: HOSPADM

## 2020-07-07 RX ORDER — ACETAMINOPHEN 325 MG/1
650 TABLET ORAL EVERY 6 HOURS
Status: DISPENSED | OUTPATIENT
Start: 2020-07-07 | End: 2020-07-08

## 2020-07-07 RX ORDER — ADHESIVE BANDAGE
30 BANDAGE TOPICAL 2 TIMES DAILY PRN
Status: DISCONTINUED | OUTPATIENT
Start: 2020-07-08 | End: 2020-07-10 | Stop reason: HOSPADM

## 2020-07-07 RX ORDER — CEFAZOLIN SODIUM 2 G/50ML
2 SOLUTION INTRAVENOUS ONCE
Status: DISCONTINUED | OUTPATIENT
Start: 2020-07-07 | End: 2020-07-07

## 2020-07-07 RX ORDER — OXYCODONE HYDROCHLORIDE 5 MG/1
5 TABLET ORAL EVERY 4 HOURS PRN
Status: DISCONTINUED | OUTPATIENT
Start: 2020-07-07 | End: 2020-07-08

## 2020-07-07 RX ORDER — METHYLERGONOVINE MALEATE 0.2 MG/ML
200 INJECTION INTRAVENOUS
Status: DISCONTINUED | OUTPATIENT
Start: 2020-07-07 | End: 2020-07-07

## 2020-07-07 RX ORDER — PRENATAL WITH FERROUS FUM AND FOLIC ACID 3080; 920; 120; 400; 22; 1.84; 3; 20; 10; 1; 12; 200; 27; 25; 2 [IU]/1; [IU]/1; MG/1; [IU]/1; MG/1; MG/1; MG/1; MG/1; MG/1; MG/1; UG/1; MG/1; MG/1; MG/1; MG/1
1 TABLET ORAL DAILY
Status: DISCONTINUED | OUTPATIENT
Start: 2020-07-07 | End: 2020-07-07

## 2020-07-07 RX ORDER — SODIUM CITRATE AND CITRIC ACID MONOHYDRATE 334; 500 MG/5ML; MG/5ML
30 SOLUTION ORAL ONCE
Status: CANCELLED | OUTPATIENT
Start: 2020-07-07 | End: 2020-07-07

## 2020-07-07 RX ORDER — ACETAMINOPHEN 325 MG/1
650 TABLET ORAL EVERY 6 HOURS
Status: DISCONTINUED | OUTPATIENT
Start: 2020-07-07 | End: 2020-07-07

## 2020-07-07 RX ORDER — KETOROLAC TROMETHAMINE 30 MG/ML
30 INJECTION, SOLUTION INTRAMUSCULAR; INTRAVENOUS EVERY 6 HOURS
Status: DISCONTINUED | OUTPATIENT
Start: 2020-07-07 | End: 2020-07-07

## 2020-07-07 RX ORDER — ONDANSETRON 8 MG/1
8 TABLET, ORALLY DISINTEGRATING ORAL EVERY 8 HOURS PRN
Status: DISCONTINUED | OUTPATIENT
Start: 2020-07-07 | End: 2020-07-10 | Stop reason: HOSPADM

## 2020-07-07 RX ORDER — FAMOTIDINE 10 MG/ML
20 INJECTION INTRAVENOUS ONCE
Status: COMPLETED | OUTPATIENT
Start: 2020-07-07 | End: 2020-07-07

## 2020-07-07 RX ORDER — OXYTOCIN/RINGER'S LACTATE 30/500 ML
95 PLASTIC BAG, INJECTION (ML) INTRAVENOUS ONCE
Status: DISCONTINUED | OUTPATIENT
Start: 2020-07-07 | End: 2020-07-07

## 2020-07-07 RX ORDER — HYDROCORTISONE 25 MG/G
CREAM TOPICAL 3 TIMES DAILY PRN
Status: DISCONTINUED | OUTPATIENT
Start: 2020-07-07 | End: 2020-07-10 | Stop reason: HOSPADM

## 2020-07-07 RX ORDER — MISOPROSTOL 200 UG/1
800 TABLET ORAL
Status: DISCONTINUED | OUTPATIENT
Start: 2020-07-07 | End: 2020-07-07

## 2020-07-07 RX ORDER — OXYCODONE HYDROCHLORIDE 5 MG/1
10 TABLET ORAL EVERY 4 HOURS PRN
Status: DISCONTINUED | OUTPATIENT
Start: 2020-07-07 | End: 2020-07-08

## 2020-07-07 RX ORDER — FAMOTIDINE 10 MG/ML
20 INJECTION INTRAVENOUS ONCE
Status: CANCELLED | OUTPATIENT
Start: 2020-07-07 | End: 2020-07-07

## 2020-07-07 RX ORDER — OXYTOCIN/RINGER'S LACTATE 30/500 ML
334 PLASTIC BAG, INJECTION (ML) INTRAVENOUS ONCE
Status: DISCONTINUED | OUTPATIENT
Start: 2020-07-07 | End: 2020-07-07

## 2020-07-07 RX ORDER — OXYTOCIN/RINGER'S LACTATE 30/500 ML
95 PLASTIC BAG, INJECTION (ML) INTRAVENOUS ONCE
Status: DISCONTINUED | OUTPATIENT
Start: 2020-07-07 | End: 2020-07-08

## 2020-07-07 RX ORDER — ONDANSETRON 2 MG/ML
4 INJECTION INTRAMUSCULAR; INTRAVENOUS EVERY 6 HOURS PRN
Status: ACTIVE | OUTPATIENT
Start: 2020-07-07 | End: 2020-07-08

## 2020-07-07 RX ORDER — CARBOPROST TROMETHAMINE 250 UG/ML
250 INJECTION, SOLUTION INTRAMUSCULAR
Status: DISCONTINUED | OUTPATIENT
Start: 2020-07-07 | End: 2020-07-07

## 2020-07-07 RX ORDER — OXYCODONE AND ACETAMINOPHEN 10; 325 MG/1; MG/1
1 TABLET ORAL EVERY 4 HOURS PRN
Status: DISCONTINUED | OUTPATIENT
Start: 2020-07-07 | End: 2020-07-08

## 2020-07-07 RX ORDER — IBUPROFEN 600 MG/1
600 TABLET ORAL EVERY 6 HOURS
Status: DISCONTINUED | OUTPATIENT
Start: 2020-07-07 | End: 2020-07-08

## 2020-07-07 RX ORDER — FERROUS SULFATE 325(65) MG
325 TABLET, DELAYED RELEASE (ENTERIC COATED) ORAL 2 TIMES DAILY
Status: DISCONTINUED | OUTPATIENT
Start: 2020-07-07 | End: 2020-07-10 | Stop reason: HOSPADM

## 2020-07-07 RX ORDER — PRENATAL WITH FERROUS FUM AND FOLIC ACID 3080; 920; 120; 400; 22; 1.84; 3; 20; 10; 1; 12; 200; 27; 25; 2 [IU]/1; [IU]/1; MG/1; [IU]/1; MG/1; MG/1; MG/1; MG/1; MG/1; MG/1; UG/1; MG/1; MG/1; MG/1; MG/1
1 TABLET ORAL DAILY
Status: DISCONTINUED | OUTPATIENT
Start: 2020-07-07 | End: 2020-07-08

## 2020-07-07 RX ORDER — AMOXICILLIN 250 MG
1 CAPSULE ORAL NIGHTLY PRN
Status: DISCONTINUED | OUTPATIENT
Start: 2020-07-07 | End: 2020-07-10 | Stop reason: HOSPADM

## 2020-07-07 RX ORDER — SODIUM CHLORIDE, SODIUM LACTATE, POTASSIUM CHLORIDE, CALCIUM CHLORIDE 600; 310; 30; 20 MG/100ML; MG/100ML; MG/100ML; MG/100ML
INJECTION, SOLUTION INTRAVENOUS CONTINUOUS
Status: DISCONTINUED | OUTPATIENT
Start: 2020-07-07 | End: 2020-07-07

## 2020-07-07 RX ORDER — DIPHENHYDRAMINE HCL 25 MG
25 CAPSULE ORAL EVERY 4 HOURS PRN
Status: DISCONTINUED | OUTPATIENT
Start: 2020-07-07 | End: 2020-07-10 | Stop reason: HOSPADM

## 2020-07-07 RX ORDER — OXYTOCIN 10 [USP'U]/ML
INJECTION, SOLUTION INTRAMUSCULAR; INTRAVENOUS
Status: DISCONTINUED | OUTPATIENT
Start: 2020-07-07 | End: 2020-07-07

## 2020-07-07 RX ORDER — ONDANSETRON HYDROCHLORIDE 2 MG/ML
INJECTION, SOLUTION INTRAMUSCULAR; INTRAVENOUS
Status: DISCONTINUED | OUTPATIENT
Start: 2020-07-07 | End: 2020-07-07

## 2020-07-07 RX ORDER — MORPHINE SULFATE 1 MG/ML
INJECTION, SOLUTION EPIDURAL; INTRATHECAL; INTRAVENOUS
Status: DISCONTINUED | OUTPATIENT
Start: 2020-07-07 | End: 2020-07-07

## 2020-07-07 RX ORDER — SODIUM CHLORIDE, SODIUM LACTATE, POTASSIUM CHLORIDE, CALCIUM CHLORIDE 600; 310; 30; 20 MG/100ML; MG/100ML; MG/100ML; MG/100ML
INJECTION, SOLUTION INTRAVENOUS CONTINUOUS
Status: DISCONTINUED | OUTPATIENT
Start: 2020-07-07 | End: 2020-07-08

## 2020-07-07 RX ORDER — ZOLPIDEM TARTRATE 5 MG/1
10 TABLET ORAL NIGHTLY
Status: DISCONTINUED | OUTPATIENT
Start: 2020-07-07 | End: 2020-07-10 | Stop reason: HOSPADM

## 2020-07-07 RX ORDER — MUPIROCIN 20 MG/G
OINTMENT TOPICAL
Status: CANCELLED | OUTPATIENT
Start: 2020-07-07

## 2020-07-07 RX ORDER — KETOROLAC TROMETHAMINE 30 MG/ML
30 INJECTION, SOLUTION INTRAMUSCULAR; INTRAVENOUS EVERY 6 HOURS
Status: DISCONTINUED | OUTPATIENT
Start: 2020-07-07 | End: 2020-07-08

## 2020-07-07 RX ADMIN — SODIUM CHLORIDE, SODIUM LACTATE, POTASSIUM CHLORIDE, AND CALCIUM CHLORIDE: .6; .31; .03; .02 INJECTION, SOLUTION INTRAVENOUS at 12:07

## 2020-07-07 RX ADMIN — ACETAMINOPHEN 650 MG: 325 TABLET ORAL at 05:07

## 2020-07-07 RX ADMIN — PHENYLEPHRINE HYDROCHLORIDE 200 MCG: 10 INJECTION INTRAVENOUS at 01:07

## 2020-07-07 RX ADMIN — KETOROLAC TROMETHAMINE 30 MG: 30 INJECTION, SOLUTION INTRAMUSCULAR at 05:07

## 2020-07-07 RX ADMIN — FAMOTIDINE 20 MG: 10 INJECTION INTRAVENOUS at 12:07

## 2020-07-07 RX ADMIN — OXYCODONE 5 MG: 5 TABLET ORAL at 03:07

## 2020-07-07 RX ADMIN — ONDANSETRON 4 MG: 2 INJECTION, SOLUTION INTRAMUSCULAR; INTRAVENOUS at 01:07

## 2020-07-07 RX ADMIN — PHENYLEPHRINE HYDROCHLORIDE 100 MCG: 10 INJECTION INTRAVENOUS at 01:07

## 2020-07-07 RX ADMIN — ZOLPIDEM TARTRATE 10 MG: 5 TABLET ORAL at 09:07

## 2020-07-07 RX ADMIN — FENTANYL CITRATE 10 MCG: 50 INJECTION, SOLUTION INTRAMUSCULAR; INTRAVENOUS at 01:07

## 2020-07-07 RX ADMIN — OXYTOCIN 20 UNITS: 10 INJECTION, SOLUTION INTRAMUSCULAR; INTRAVENOUS at 01:07

## 2020-07-07 RX ADMIN — SODIUM CHLORIDE, SODIUM LACTATE, POTASSIUM CHLORIDE, AND CALCIUM CHLORIDE: .6; .31; .03; .02 INJECTION, SOLUTION INTRAVENOUS at 08:07

## 2020-07-07 RX ADMIN — SODIUM CHLORIDE, SODIUM LACTATE, POTASSIUM CHLORIDE, AND CALCIUM CHLORIDE: .6; .31; .03; .02 INJECTION, SOLUTION INTRAVENOUS at 01:07

## 2020-07-07 RX ADMIN — Medication 0.1 MG: at 01:07

## 2020-07-07 RX ADMIN — KETOROLAC TROMETHAMINE 30 MG: 30 INJECTION, SOLUTION INTRAMUSCULAR at 11:07

## 2020-07-07 RX ADMIN — FERROUS SULFATE TAB EC 325 MG (65 MG FE EQUIVALENT) 325 MG: 325 (65 FE) TABLET DELAYED RESPONSE at 07:07

## 2020-07-07 RX ADMIN — DIPHENHYDRAMINE HYDROCHLORIDE 25 MG: 25 CAPSULE ORAL at 08:07

## 2020-07-07 RX ADMIN — DOCUSATE SODIUM 200 MG: 100 CAPSULE, LIQUID FILLED ORAL at 07:07

## 2020-07-07 RX ADMIN — CITALOPRAM HYDROBROMIDE 20 MG: 20 TABLET ORAL at 03:07

## 2020-07-07 RX ADMIN — SODIUM CHLORIDE, SODIUM LACTATE, POTASSIUM CHLORIDE, AND CALCIUM CHLORIDE 1000 ML: .6; .31; .03; .02 INJECTION, SOLUTION INTRAVENOUS at 11:07

## 2020-07-07 RX ADMIN — OXYCODONE 10 MG: 5 TABLET ORAL at 07:07

## 2020-07-07 RX ADMIN — SODIUM CITRATE AND CITRIC ACID MONOHYDRATE 30 ML: 500; 334 SOLUTION ORAL at 12:07

## 2020-07-07 RX ADMIN — ACETAMINOPHEN 650 MG: 325 TABLET ORAL at 11:07

## 2020-07-07 RX ADMIN — MUPIROCIN: 20 OINTMENT TOPICAL at 07:07

## 2020-07-07 NOTE — NURSING
1150: pt arrived to unit for scheduled repeat c/section taking hibiclens shower, reports good fetal movement, denies LOF, vaginal bleeding, pain. Abdomen soft on palpation  1125: labs sent to lab  1136:  called, will call again

## 2020-07-07 NOTE — TRANSFER OF CARE
"Anesthesia Transfer of Care Note    Patient: Darlin Silva    Procedure(s) Performed: Procedure(s) (LRB):   SECTION, WITH TUBAL LIGATION (N/A)    Patient location: Labor and Delivery    Anesthesia Type: spinal    Transport from OR: Transported from OR on room air with adequate spontaneous ventilation    Post pain: adequate analgesia    Post assessment: no apparent anesthetic complications and tolerated procedure well    Post vital signs: stable    Level of consciousness: awake, alert and oriented    Nausea/Vomiting: no nausea/vomiting    Complications: none    Transfer of care protocol was followed      Last vitals:   Visit Vitals  BP (!) 107/55 (BP Location: Left arm, Patient Position: Lying)   Pulse 60   Temp 36.3 °C (97.4 °F) (Oral)   Resp 18   Ht 5' 9" (1.753 m)   Wt 113.9 kg (251 lb)   LMP 10/14/2019   SpO2 100%   Breastfeeding No   BMI 37.07 kg/m²     "

## 2020-07-07 NOTE — NURSING
1600:Pt transferred to mother baby room via stretcher, transferred to bed, dante care performed, bed in low locked position, side rails raised x 2, call light in reach, Bean catheter draining to gravity, SCD's on calf bilaterally and functioning, JanieRN at bedside, care handed off. AAOX4, VSS, respirations equal and unlabored.

## 2020-07-07 NOTE — LACTATION NOTE
"This note was copied from a baby's chart.  Spoke with mother in L&D recovery.  States that her feeding plan is to formula feed only, does not wish to breastfeed or pump for the baby.  Discussed the benefits of breastfeeding and the risks of formula.  States "understand", continues to desire to formula feed only.  Encouraged to call for assist/concerns prn.  "

## 2020-07-07 NOTE — ANESTHESIA PREPROCEDURE EVALUATION
07/07/2020  Darlin Silva is a 34 y.o., female for csection x 4.  Pt has pmh of opioid use now on suboxone 8 mg daily.  Last dose yesterday morning.    Anesthesia Evaluation    I have reviewed the Patient Summary Reports.    I have reviewed the Nursing Notes.    I have reviewed the Medications.     Review of Systems  Anesthesia Hx:  No problems with previous Anesthesia Denies Hx of Anesthetic complications  Neg history of prior surgery. Denies Family Hx of Anesthesia complications.   Denies Personal Hx of Anesthesia complications.   Social:  Non-Smoker, No Alcohol Use    Hematology/Oncology:  Hematology Normal   Oncology Normal     EENT/Dental:EENT/Dental Normal   Cardiovascular:   Exercise tolerance: good Hypertension    Pulmonary:  Pulmonary Normal    Renal/:  Renal/ Normal     Hepatic/GI:   GERD    Musculoskeletal:  Musculoskeletal Normal    Neurological:  Neurology Normal    Endocrine:  Endocrine Normal    Dermatological:  Skin Normal    Psych:   anxiety depression          Physical Exam  General:  Well nourished    Airway/Jaw/Neck:  Airway Findings: Mouth Opening: Normal General Airway Assessment: Adult  Mallampati: II  TM Distance: Normal, at least 6 cm         Dental:  DENTAL FINDINGS: Normal   Chest/Lungs:  Chest/Lungs Clear    Heart/Vascular:  Heart Findings: Normal Heart murmur: negative       Mental Status:  Mental Status Findings:  Cooperative, Alert and Oriented         Anesthesia Plan  Type of Anesthesia, risks & benefits discussed:  Anesthesia Type:  spinal  Patient's Preference:   Intra-op Monitoring Plan:   Intra-op Monitoring Plan Comments:   Post Op Pain Control Plan: multimodal analgesia  Post Op Pain Control Plan Comments:   Induction:    Beta Blocker:         Informed Consent: Patient understands risks and agrees with Anesthesia plan.  Questions answered.   ASA Score: 2     Day of  Surgery Review of History & Physical:        Anesthesia Plan Notes: Discussed with patient that pain control may be difficult given suboxone.  Will give intrathecal opioids, however may not be effective.  Schedule tylenol and toradol ordered, however may need ketamine post op if pain is not well controlled.        Ready For Surgery From Anesthesia Perspective.

## 2020-07-07 NOTE — INTERVAL H&P NOTE
The patient has been examined and the H&P has been reviewed:    I concur with the findings and no changes have occurred since H&P was written.    Anesthesia/Surgery risks, benefits and alternative options discussed and understood by patient/family.          Active Hospital Problems    Diagnosis  POA    Pregnancy [Z34.90]  Not Applicable      Resolved Hospital Problems   No resolved problems to display.
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
## 2020-07-07 NOTE — ANESTHESIA PROCEDURE NOTES
Spinal    Diagnosis:  delivery  Patient location during procedure: OR  Start time: 2020 1:02 PM  Timeout: 2020 1:01 PM  End time: 2020 1:04 PM    Staffing  Authorizing Provider: Marcella Zavaleta MD  Performing Provider: Marcella Zavaleta MD    Preanesthetic Checklist  Completed: patient identified, site marked, surgical consent, pre-op evaluation, timeout performed, IV checked, risks and benefits discussed and monitors and equipment checked  Spinal Block  Patient position: sitting  Prep: ChloraPrep  Patient monitoring: heart rate, cardiac monitor and continuous pulse ox  Approach: midline  Location: L4-5  Injection technique: single shot  CSF Fluid: clear free-flowing CSF  Needle  Needle type: pencil-tip   Needle gauge: 25 G  Needle length: 3.5 in  Additional Documentation: incremental injection, negative aspiration for CSF, negative aspiration for heme and no paresthesia on injection  Needle localization: anatomical landmarks  Assessment  Sensory level: T4   Dermatomal levels determined by pinch or prick  Ease of block: easy  Patient's tolerance of the procedure: comfortable throughout block and no complaints

## 2020-07-07 NOTE — OP NOTE
2020      Pre-op: Term pregnancy    IUGR    Undesired fertility     H/o multiple c/s    Opiate use        Post-op:   Term pregnancy    IUGR    Undesired fertility     H/o multiple c/s    Opiate use        Procedure:  repeat Low Transverse  Section with 2 layer closure and bilateral tubal ligation    Indications: 34 y.o.  with IUP at 38w1d with h/o prev c/s, IUGR, and undesired fertility. Also uses suboxone.     Findings:  female infant, vertex presentation, APGARS 8 at 1 minute, 9 at 5 minutes, weight is pending, normal uterus, tubes and ovaries      EBL: 700 cc    Specimen:  Placenta sent No    Complications:  None    Patient was taken to the operating room after informed consent.  Epidural anesthesia was found to be adequate.  She was prepped and draped in the normal sterile fashion in the dorsal supine position.  Bean catheter had been placed.  A Pfannenstiel skin incision was made  and carried down to the underlying layer of fascia with the Bovie.  The fascia was incised in the midline and extended laterally with the curved Corea scissors.  The inferior aspect of the fascial incision was grasped with the Ochsner clamps, and the rectus muscle was dissected off using the Bovie Cautery.  The superior aspect of the fascial incision was grasped with the Ochsner clamps, and the rectus muscle was dissected off using the curved Corea scissors.  The rectus muscles were  in the midline.  The peritoneum was grasped, elevated, and entered sharply with the Metzenbaum scissors.  The incision was extended superiorly and inferiorly with good visualization of the bladder.  The Octavia self-retaining retractor was placed within the peritoneal cavity and deployed. The vesicouterine peritoneum was grasped with the pick-ups, elevated, and entered sharply with the Metzenbaum scissors.  The incision was extended laterally, and the bladder flap was created digitally.  The bladder blade was reinserted.  A low  transverse incision was made with the scalpel and extended laterally with finger fracture technique.  Membranes were ruptured.  Clear fluid was present.  The vertex was delivered atraumatically.  The mouth and nose were suctioned with the bulb.  The remaining infant delivered without difficulty.  The cord was cut and clamped.  The infant was handed to the awaiting  nurse practitioner.  The placenta was delivered.  The uterus was cleared of all clots and debris.  The uterus was repaired in a running, locked fashion with #1 chromic.  A second layer using #1 chromic was used to imbricate the incision. A modified Rick tubal ligation was done bilaterally.  The paracolic gutters were cleared of clots and debris.  The uterine incision was irrigated and found to be hemostasis.  The rectus muscles were plicated with chromic suture.  The fascia was closed with 1 looped PDS in a running fashion.  The subcutaneous tissue was re-approximated with 2-0 plain gut.  The skin was closed with Insorb stables.      The patient tolerated the procedure without well.  All counts were correct.  Patient will be taken to the recovery room stable condition.    Chad Arambula MD

## 2020-07-08 PROBLEM — O14.90 PREECLAMPSIA: Status: RESOLVED | Noted: 2018-12-13 | Resolved: 2020-07-08

## 2020-07-08 PROBLEM — D69.6 THROMBOCYTOPENIA: Status: ACTIVE | Noted: 2020-07-08

## 2020-07-08 PROBLEM — D64.9 SYMPTOMATIC ANEMIA: Status: ACTIVE | Noted: 2020-07-08

## 2020-07-08 PROBLEM — D62 ACUTE BLOOD LOSS ANEMIA: Status: ACTIVE | Noted: 2020-07-08

## 2020-07-08 PROBLEM — D72.829 LEUKOCYTOSIS: Status: ACTIVE | Noted: 2020-07-08

## 2020-07-08 LAB
ABO + RH BLD: NORMAL
ALBUMIN SERPL BCP-MCNC: 2.3 G/DL (ref 3.5–5.2)
ALLENS TEST: ABNORMAL
ALP SERPL-CCNC: 51 U/L (ref 55–135)
ALT SERPL W/O P-5'-P-CCNC: 12 U/L (ref 10–44)
ANION GAP SERPL CALC-SCNC: 17 MMOL/L (ref 8–16)
ANION GAP SERPL CALC-SCNC: 7 MMOL/L (ref 8–16)
APTT BLDCRRT: 24.3 SEC (ref 21–32)
AST SERPL-CCNC: 21 U/L (ref 10–40)
BASOPHILS # BLD AUTO: 0.02 K/UL (ref 0–0.2)
BASOPHILS # BLD AUTO: 0.03 K/UL (ref 0–0.2)
BASOPHILS # BLD AUTO: 0.04 K/UL (ref 0–0.2)
BASOPHILS NFR BLD: 0.2 % (ref 0–1.9)
BASOPHILS NFR BLD: 0.3 % (ref 0–1.9)
BASOPHILS NFR BLD: 0.3 % (ref 0–1.9)
BILIRUB SERPL-MCNC: 0.2 MG/DL (ref 0.1–1)
BLD GP AB SCN CELLS X3 SERPL QL: NORMAL
BLD PROD TYP BPU: NORMAL
BLD PROD TYP BPU: NORMAL
BLOOD UNIT EXPIRATION DATE: NORMAL
BLOOD UNIT EXPIRATION DATE: NORMAL
BLOOD UNIT TYPE CODE: 9500
BLOOD UNIT TYPE CODE: 9500
BLOOD UNIT TYPE: NORMAL
BLOOD UNIT TYPE: NORMAL
BUN SERPL-MCNC: 13 MG/DL (ref 6–30)
BUN SERPL-MCNC: 14 MG/DL (ref 6–20)
CALCIUM SERPL-MCNC: 7.8 MG/DL (ref 8.7–10.5)
CHLORIDE SERPL-SCNC: 104 MMOL/L (ref 95–110)
CHLORIDE SERPL-SCNC: 107 MMOL/L (ref 95–110)
CO2 SERPL-SCNC: 23 MMOL/L (ref 23–29)
CODING SYSTEM: NORMAL
CODING SYSTEM: NORMAL
CREAT SERPL-MCNC: 0.6 MG/DL (ref 0.5–1.4)
CREAT SERPL-MCNC: 0.7 MG/DL (ref 0.5–1.4)
DELSYS: ABNORMAL
DIFFERENTIAL METHOD: ABNORMAL
DISPENSE STATUS: NORMAL
DISPENSE STATUS: NORMAL
EOSINOPHIL # BLD AUTO: 0 K/UL (ref 0–0.5)
EOSINOPHIL # BLD AUTO: 0.1 K/UL (ref 0–0.5)
EOSINOPHIL # BLD AUTO: 0.1 K/UL (ref 0–0.5)
EOSINOPHIL NFR BLD: 0.3 % (ref 0–8)
EOSINOPHIL NFR BLD: 0.4 % (ref 0–8)
EOSINOPHIL NFR BLD: 1.1 % (ref 0–8)
ERYTHROCYTE [DISTWIDTH] IN BLOOD BY AUTOMATED COUNT: 12.9 % (ref 11.5–14.5)
ERYTHROCYTE [DISTWIDTH] IN BLOOD BY AUTOMATED COUNT: 13.6 % (ref 11.5–14.5)
ERYTHROCYTE [DISTWIDTH] IN BLOOD BY AUTOMATED COUNT: 13.6 % (ref 11.5–14.5)
EST. GFR  (AFRICAN AMERICAN): >60 ML/MIN/1.73 M^2
EST. GFR  (NON AFRICAN AMERICAN): >60 ML/MIN/1.73 M^2
FETAL CELL SCN BLD QL ROSETTE: NORMAL
FIBRINOGEN PPP-MCNC: 274 MG/DL (ref 182–366)
FLOW: 4
GLUCOSE SERPL-MCNC: 119 MG/DL (ref 70–110)
GLUCOSE SERPL-MCNC: 128 MG/DL (ref 70–110)
HCO3 UR-SCNC: 20.3 MMOL/L (ref 24–28)
HCT VFR BLD AUTO: 23.8 % (ref 37–48.5)
HCT VFR BLD AUTO: 26.4 % (ref 37–48.5)
HCT VFR BLD AUTO: 30.5 % (ref 37–48.5)
HCT VFR BLD CALC: 23 %PCV (ref 36–54)
HGB BLD-MCNC: 7.7 G/DL (ref 12–16)
HGB BLD-MCNC: 8.4 G/DL (ref 12–16)
HGB BLD-MCNC: 9.7 G/DL (ref 12–16)
IMM GRANULOCYTES # BLD AUTO: 0.07 K/UL (ref 0–0.04)
IMM GRANULOCYTES # BLD AUTO: 0.13 K/UL (ref 0–0.04)
IMM GRANULOCYTES # BLD AUTO: 0.29 K/UL (ref 0–0.04)
IMM GRANULOCYTES NFR BLD AUTO: 0.8 % (ref 0–0.5)
IMM GRANULOCYTES NFR BLD AUTO: 1.3 % (ref 0–0.5)
IMM GRANULOCYTES NFR BLD AUTO: 2.1 % (ref 0–0.5)
INJECT RH IG VOL PATIENT: NORMAL ML
INR PPP: 0.9 (ref 0.8–1.2)
LYMPHOCYTES # BLD AUTO: 1.4 K/UL (ref 1–4.8)
LYMPHOCYTES # BLD AUTO: 1.5 K/UL (ref 1–4.8)
LYMPHOCYTES # BLD AUTO: 3 K/UL (ref 1–4.8)
LYMPHOCYTES NFR BLD: 14.2 % (ref 18–48)
LYMPHOCYTES NFR BLD: 15.9 % (ref 18–48)
LYMPHOCYTES NFR BLD: 21.8 % (ref 18–48)
MCH RBC QN AUTO: 27.9 PG (ref 27–31)
MCH RBC QN AUTO: 28.4 PG (ref 27–31)
MCH RBC QN AUTO: 28.7 PG (ref 27–31)
MCHC RBC AUTO-ENTMCNC: 31.8 G/DL (ref 32–36)
MCHC RBC AUTO-ENTMCNC: 31.8 G/DL (ref 32–36)
MCHC RBC AUTO-ENTMCNC: 32.4 G/DL (ref 32–36)
MCV RBC AUTO: 88 FL (ref 82–98)
MCV RBC AUTO: 89 FL (ref 82–98)
MCV RBC AUTO: 89 FL (ref 82–98)
MODE: ABNORMAL
MONOCYTES # BLD AUTO: 0.5 K/UL (ref 0.3–1)
MONOCYTES # BLD AUTO: 0.6 K/UL (ref 0.3–1)
MONOCYTES # BLD AUTO: 0.9 K/UL (ref 0.3–1)
MONOCYTES NFR BLD: 4.7 % (ref 4–15)
MONOCYTES NFR BLD: 6.2 % (ref 4–15)
MONOCYTES NFR BLD: 6.6 % (ref 4–15)
NEUTROPHILS # BLD AUTO: 7 K/UL (ref 1.8–7.7)
NEUTROPHILS # BLD AUTO: 7.7 K/UL (ref 1.8–7.7)
NEUTROPHILS # BLD AUTO: 9.5 K/UL (ref 1.8–7.7)
NEUTROPHILS NFR BLD: 68.8 % (ref 38–73)
NEUTROPHILS NFR BLD: 75.7 % (ref 38–73)
NEUTROPHILS NFR BLD: 79.3 % (ref 38–73)
NRBC BLD-RTO: 0 /100 WBC
PCO2 BLDA: 38.4 MMHG (ref 35–45)
PH SMN: 7.33 [PH] (ref 7.35–7.45)
PLATELET # BLD AUTO: 147 K/UL (ref 150–350)
PLATELET # BLD AUTO: 169 K/UL (ref 150–350)
PLATELET # BLD AUTO: 198 K/UL (ref 150–350)
PMV BLD AUTO: 10.4 FL (ref 9.2–12.9)
PMV BLD AUTO: 10.5 FL (ref 9.2–12.9)
PMV BLD AUTO: 9.9 FL (ref 9.2–12.9)
PO2 BLDA: 30 MMHG (ref 40–60)
POC BE: -5 MMOL/L
POC IONIZED CALCIUM: 1.23 MMOL/L (ref 1.06–1.42)
POC SATURATED O2: 53 % (ref 95–100)
POC TCO2 (MEASURED): 20 MMOL/L (ref 23–29)
POC TCO2: 21 MMOL/L (ref 24–29)
POTASSIUM BLD-SCNC: 3.9 MMOL/L (ref 3.5–5.1)
POTASSIUM SERPL-SCNC: 3.9 MMOL/L (ref 3.5–5.1)
PROT SERPL-MCNC: 4.5 G/DL (ref 6–8.4)
PROTHROMBIN TIME: 10.1 SEC (ref 9–12.5)
RBC # BLD AUTO: 2.68 M/UL (ref 4–5.4)
RBC # BLD AUTO: 3.01 M/UL (ref 4–5.4)
RBC # BLD AUTO: 3.42 M/UL (ref 4–5.4)
RPR SER QL: NORMAL
SAMPLE: ABNORMAL
SAMPLE: ABNORMAL
SITE: ABNORMAL
SODIUM BLD-SCNC: 136 MMOL/L (ref 136–145)
SODIUM SERPL-SCNC: 137 MMOL/L (ref 136–145)
TRANS ERYTHROCYTES VOL PATIENT: NORMAL ML
TRANS ERYTHROCYTES VOL PATIENT: NORMAL ML
WBC # BLD AUTO: 13.74 K/UL (ref 3.9–12.7)
WBC # BLD AUTO: 9.2 K/UL (ref 3.9–12.7)
WBC # BLD AUTO: 9.66 K/UL (ref 3.9–12.7)

## 2020-07-08 PROCEDURE — 27000221 HC OXYGEN, UP TO 24 HOURS

## 2020-07-08 PROCEDURE — P9021 RED BLOOD CELLS UNIT: HCPCS

## 2020-07-08 PROCEDURE — 84132 ASSAY OF SERUM POTASSIUM: CPT

## 2020-07-08 PROCEDURE — 80053 COMPREHEN METABOLIC PANEL: CPT

## 2020-07-08 PROCEDURE — 63600175 PHARM REV CODE 636 W HCPCS: Performed by: HOSPITALIST

## 2020-07-08 PROCEDURE — 25000003 PHARM REV CODE 250: Performed by: OBSTETRICS & GYNECOLOGY

## 2020-07-08 PROCEDURE — 63600175 PHARM REV CODE 636 W HCPCS: Performed by: OBSTETRICS & GYNECOLOGY

## 2020-07-08 PROCEDURE — 25000003 PHARM REV CODE 250: Performed by: HOSPITALIST

## 2020-07-08 PROCEDURE — 84295 ASSAY OF SERUM SODIUM: CPT

## 2020-07-08 PROCEDURE — 82565 ASSAY OF CREATININE: CPT

## 2020-07-08 PROCEDURE — 11000001 HC ACUTE MED/SURG PRIVATE ROOM

## 2020-07-08 PROCEDURE — 36430 TRANSFUSION BLD/BLD COMPNT: CPT

## 2020-07-08 PROCEDURE — 85610 PROTHROMBIN TIME: CPT

## 2020-07-08 PROCEDURE — 86901 BLOOD TYPING SEROLOGIC RH(D): CPT

## 2020-07-08 PROCEDURE — 85384 FIBRINOGEN ACTIVITY: CPT

## 2020-07-08 PROCEDURE — 82803 BLOOD GASES ANY COMBINATION: CPT

## 2020-07-08 PROCEDURE — 85025 COMPLETE CBC W/AUTO DIFF WBC: CPT | Mod: 91

## 2020-07-08 PROCEDURE — 85014 HEMATOCRIT: CPT

## 2020-07-08 PROCEDURE — 93010 ELECTROCARDIOGRAM REPORT: CPT | Mod: ,,, | Performed by: INTERNAL MEDICINE

## 2020-07-08 PROCEDURE — 93005 ELECTROCARDIOGRAM TRACING: CPT

## 2020-07-08 PROCEDURE — 82330 ASSAY OF CALCIUM: CPT

## 2020-07-08 PROCEDURE — 85461 HEMOGLOBIN FETAL: CPT

## 2020-07-08 PROCEDURE — 93010 EKG 12-LEAD: ICD-10-PCS | Mod: ,,, | Performed by: INTERNAL MEDICINE

## 2020-07-08 PROCEDURE — 85730 THROMBOPLASTIN TIME PARTIAL: CPT

## 2020-07-08 PROCEDURE — 99900035 HC TECH TIME PER 15 MIN (STAT)

## 2020-07-08 PROCEDURE — 94761 N-INVAS EAR/PLS OXIMETRY MLT: CPT

## 2020-07-08 PROCEDURE — 63600175 PHARM REV CODE 636 W HCPCS

## 2020-07-08 PROCEDURE — 63600519 RHOGAM PHARM REV CODE 636 ALT 250 W HCPCS: Performed by: OBSTETRICS & GYNECOLOGY

## 2020-07-08 PROCEDURE — 36415 COLL VENOUS BLD VENIPUNCTURE: CPT

## 2020-07-08 RX ORDER — IBUPROFEN 600 MG/1
600 TABLET ORAL EVERY 6 HOURS PRN
Status: DISCONTINUED | OUTPATIENT
Start: 2020-07-08 | End: 2020-07-10 | Stop reason: HOSPADM

## 2020-07-08 RX ORDER — ONDANSETRON 2 MG/ML
4 INJECTION INTRAMUSCULAR; INTRAVENOUS EVERY 6 HOURS PRN
Status: ACTIVE | OUTPATIENT
Start: 2020-07-08 | End: 2020-07-09

## 2020-07-08 RX ORDER — BUPRENORPHINE HYDROCHLORIDE, NALOXONE HYDROCHLORIDE 8; 2 MG/1; MG/1
8 FILM, SOLUBLE BUCCAL; SUBLINGUAL DAILY
COMMUNITY
Start: 2020-06-26

## 2020-07-08 RX ORDER — OXYCODONE AND ACETAMINOPHEN 5; 325 MG/1; MG/1
1 TABLET ORAL EVERY 8 HOURS PRN
Status: DISCONTINUED | OUTPATIENT
Start: 2020-07-08 | End: 2020-07-10 | Stop reason: HOSPADM

## 2020-07-08 RX ORDER — ZOLPIDEM TARTRATE 10 MG/1
10 TABLET ORAL NIGHTLY
Status: ON HOLD | COMMUNITY
Start: 2020-06-14 | End: 2020-07-10 | Stop reason: HOSPADM

## 2020-07-08 RX ORDER — BUPRENORPHINE AND NALOXONE 2; .5 MG/1; MG/1
4 FILM, SOLUBLE BUCCAL; SUBLINGUAL DAILY
Status: DISCONTINUED | OUTPATIENT
Start: 2020-07-08 | End: 2020-07-10 | Stop reason: HOSPADM

## 2020-07-08 RX ORDER — NALOXONE HCL 0.4 MG/ML
VIAL (ML) INJECTION
Status: COMPLETED
Start: 2020-07-08 | End: 2020-07-08

## 2020-07-08 RX ORDER — ACETAMINOPHEN 325 MG/1
650 TABLET ORAL EVERY 6 HOURS PRN
Status: DISCONTINUED | OUTPATIENT
Start: 2020-07-08 | End: 2020-07-10 | Stop reason: HOSPADM

## 2020-07-08 RX ORDER — HYDROCODONE BITARTRATE AND ACETAMINOPHEN 500; 5 MG/1; MG/1
TABLET ORAL
Status: DISCONTINUED | OUTPATIENT
Start: 2020-07-08 | End: 2020-07-08

## 2020-07-08 RX ADMIN — MUPIROCIN: 20 OINTMENT TOPICAL at 09:07

## 2020-07-08 RX ADMIN — ACETAMINOPHEN 650 MG: 325 TABLET ORAL at 04:07

## 2020-07-08 RX ADMIN — MUPIROCIN: 20 OINTMENT TOPICAL at 12:07

## 2020-07-08 RX ADMIN — ZOLPIDEM TARTRATE 10 MG: 5 TABLET ORAL at 09:07

## 2020-07-08 RX ADMIN — PRENATAL VIT W/ FE FUMARATE-FA TAB 27-0.8 MG 1 TABLET: 27-0.8 TAB at 12:07

## 2020-07-08 RX ADMIN — DOCUSATE SODIUM 200 MG: 100 CAPSULE, LIQUID FILLED ORAL at 09:07

## 2020-07-08 RX ADMIN — CITALOPRAM HYDROBROMIDE 20 MG: 20 TABLET ORAL at 12:07

## 2020-07-08 RX ADMIN — NALOXONE HYDROCHLORIDE 0.4 MG: 0.4 INJECTION, SOLUTION INTRAMUSCULAR; INTRAVENOUS; SUBCUTANEOUS at 01:07

## 2020-07-08 RX ADMIN — FERROUS SULFATE TAB EC 325 MG (65 MG FE EQUIVALENT) 325 MG: 325 (65 FE) TABLET DELAYED RESPONSE at 12:07

## 2020-07-08 RX ADMIN — HUMAN RHO(D) IMMUNE GLOBULIN 300 MCG: 300 INJECTION, SOLUTION INTRAMUSCULAR at 03:07

## 2020-07-08 RX ADMIN — OXYCODONE HYDROCHLORIDE AND ACETAMINOPHEN 1 TABLET: 5; 325 TABLET ORAL at 12:07

## 2020-07-08 RX ADMIN — FERROUS SULFATE TAB EC 325 MG (65 MG FE EQUIVALENT) 325 MG: 325 (65 FE) TABLET DELAYED RESPONSE at 09:07

## 2020-07-08 RX ADMIN — BUPRENORPHINE AND NALOXONE 4 FILM: 2; .5 FILM BUCCAL; SUBLINGUAL at 04:07

## 2020-07-08 RX ADMIN — DOCUSATE SODIUM 200 MG: 100 CAPSULE, LIQUID FILLED ORAL at 12:07

## 2020-07-08 RX ADMIN — SODIUM CHLORIDE, SODIUM LACTATE, POTASSIUM CHLORIDE, AND CALCIUM CHLORIDE: .6; .31; .03; .02 INJECTION, SOLUTION INTRAVENOUS at 08:07

## 2020-07-08 RX ADMIN — IBUPROFEN 600 MG: 600 TABLET, FILM COATED ORAL at 09:07

## 2020-07-08 NOTE — NURSING
Patient called requesting for help to go to bathroom.     Bean catheter removed per orders before ambulating patient to bathroom.    Patient AAOX4, was able to walk with stand by assistance to the bathroom. Patient sat on toilet and had a bowel movement. Shortly after this patient called for help stating she did not feel well. Patient noted to be diaphoretic, and stated she feels like passing out, then suddenly patients eyes rolled back, called for extra help, charge RN arrived to room. Rapid response called at 1:15 am. And Dr. Hutchinson called at 1:25.    Patient was slowly eased to floor by nurses as it was very difficult to get patient off toilet seat and into wheelchair.     Patient was talking on and off throughout all this.     Large BM noted on toilet with scant lochia noted on hat.     Very difficult to obtain a blood pressure reading on patient, was able to get a reading of 120/77, HR of 105 and oxygen saturation 95 on room air.     Patient moved from floor back to bed by several staff members.    2L LR bolus given, 0.4mg of narcan given with no change in patients status noted.     Patient then transferred to ICU for closer monitoring at 2am.    Of note, patient had gotten up and walked at the begging of the shift with no issues.

## 2020-07-08 NOTE — ANESTHESIA POSTPROCEDURE EVALUATION
Anesthesia Post Evaluation    Patient: Dralin Silva    Procedure(s) Performed: Procedure(s) (LRB):   SECTION, WITH TUBAL LIGATION (N/A)    Final Anesthesia Type: CSE    Patient location during evaluation: labor & delivery  Patient participation: Yes- Able to Participate  Level of consciousness: awake and alert, oriented and awake  Post-procedure vital signs: reviewed and stable  Airway patency: patent    PONV status at discharge: No PONV  Anesthetic complications: no      Cardiovascular status: blood pressure returned to baseline  Respiratory status: unassisted, spontaneous ventilation and room air  Hydration status: euvolemic  Follow-up not needed.          Vitals Value Taken Time   /73 20 0846   Temp 36.9 °C (98.5 °F) 20 0515   Pulse 86 20 0857   Resp 17 20 0857   SpO2 98 % 20 0857   Vitals shown include unvalidated device data.      No case tracking events are documented in the log.      Pain/Perfecto Score: Pain Rating Prior to Med Admin: 8 (2020 11:03 PM)  Pain Rating Post Med Admin: 4 (2020 11:33 PM)
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
## 2020-07-08 NOTE — HPI
Ms Darlin Silva is a 34 y.o. woman who was admitted to OB service on 2020 for  and bilateral tubal ligation for intrauterine growth restriction at 37+ weeks. She had a low transverse  on . Estimated blood loss 700cc. Developed hypotension BP 88/60, diaphoresis, presyncopal episode after walking to the bathroom and having a BM around 11PM on . Given 2L IVF bolus, narcan, phenylephrine. Transferred to ICU on . Labs noted with Hgb 8.4 from pre-op 11.3. Transfused 1U RBCs with decrease in Hgb further to 7.7 Vitals stable. Mental status normal. Scant bleeding along surgical incision site. Also noted decreased platelets to 147 but normal PT/PTT/INR, normal fibrinogen, no elevated TBili, suggesting no hemolysis. Hospital medicine consulted for co-management.

## 2020-07-08 NOTE — PROGRESS NOTES
Notified Dr Shukla of pt c/o anxiety like symptoms, diaphoretic, and low b/p. Fluid bolus initiated. O2 sats 100%. Glucose 135. Scant vaginal bleeding and soft abd. Pt improving after fluids initiated. Will continue to monitor closely.

## 2020-07-08 NOTE — NURSING
[facility] [place] Nurse Note     Rapid Response Metrics:     Admit Date: 2020  LOS: 1  Code Status: Prior   Date of Consult: 2020  : 1985  Age: 34 y.o.  Weight:   Wt Readings from Last 1 Encounters:   20 113.9 kg (251 lb)     Sex: female  Race: Black or    Bed: 210   MRN: [id number]    Time Rapid Response Team page Received: 0115  Time Rapid Response Team at Bedside: 0120  Time Rapid Response Team left Bedside: 0200    Was the patient discharged from an ICU this admission?   no  Was the patient discharged from a PACU within last 24 hours?  no  Did the patient receive conscious sedation/general anesthesia within last 24 hours?  yes  Was the patient in the ED within the past 24 hours?  no  Was the patient started on NIPPV within the past 24 hours?  no    Attending Physician: Chad Arambula MD  Rapid Response Indication(s): altered mental status, low BP    SITUATION:     Reason for Call:   Called to evaluate the patient for Circulatory    BACKGROUND:     Why is the patient in the hospital?: s/p   What changed?: altered LOC, possible syncopal episode while using bathroom    ASSESSMENT:     What did you find: Pt being held up on toilet by nurses on arrival. Assisted to floor, minimally responsive. Unable to obtain BP initially. Pulse ox 98% RA, HR 130s. Bolus initiated. Phenylephrine per anesthesia. Narcan given. Pt more responsive, stated she was at the hospital. C/o pain across chest and being cold. BP improved to 120/70. . Hematocrit 23 via iSTAT.      RECOMMENDATIONS:     We recommend: Decision made to transfer to ICU for closer monitoring and blood transfusion. MD will consider CT abdomen if no improvement.      PHYSICIAN ESCALATION:     Orders received and case discussed with Hospitalist and OBGYN    Disposition: Tx in ICU bed 261.

## 2020-07-08 NOTE — PLAN OF CARE
Plan of care reviewed. Education provided. OK for level of care transfer. PO diet started. Pain controlled with prn medication. X2 units prbc's transfused, pt tolerated. BP improved. Urine output increased. Bean removed.

## 2020-07-08 NOTE — NURSING
[facility]  ICU Multidisciplinary Bedside Rounds   SUMMARY     Date: 7/8/2020    Prehospitalization: Home  Admit Date / LOS : 7/7/2020/ 1 days    Diagnosis: Pregnancy    Consults:        Active: N/A       Needed: N/A     Code Status: Prior   Advanced Directive: <no information>    LDA: Bean and PIV       Central Lines/Site/Justification:Patient Does Not Have Central Line       Urinary Cath/Order/Justification:Critically Ill in the ICU and requiring intensive monitoring    Vasopressors/Infusions:    lactated ringers 125 mL/hr at 07/07/20 2018          GOALS: Volume/ Hemodynamic: N/A                     RASS: 0  alert and calm    CAM ICU: N/A  Pain Management: PO       Pain Controlled: yes     Rhythm: NSR    Respiratory Device: Nasal Cannula                  Most Recent SBT/ SAT: N/A       MOVE Screen: PASS    VTE Prophylaxis: Mechanical  Mobility: Bedrest  Stress Ulcer Prophylaxis: No    Dietary: NPO  Tolerance: not applicable  /  Advancement: no    Isolation: No active isolations    Restraints: No    Significant Dates:  Post Op Date: N/A  Rescue Date: 7/8/20  Imaging/ Diagnostics: N/A    Noteworthy Labs:  none    CBC/Anemia Labs: Coags:    Recent Labs   Lab 07/07/20  1120 07/08/20  0147 07/08/20  0206   WBC 6.88  --  13.74*   HGB 11.3*  --  8.4*   HCT 34.7* 23* 26.4*     --  198   MCV 84  --  88   RDW 12.8  --  12.9    Recent Labs   Lab 07/08/20  0206   INR 0.9   APTT 24.3        Chemistries:   Recent Labs   Lab 07/08/20  0206      K 3.9      CO2 23   BUN 14   CREATININE 0.7   CALCIUM 7.8*   PROT 4.5*   BILITOT 0.2   ALKPHOS 51*   ALT 12   AST 21        Cardiac Enzymes: Ejection Fractions:    No results for input(s): CPK, CPKMB, MB, TROPONINI in the last 72 hours. No results found for: EF     POCT Glucose: HbA1c:    Recent Labs   Lab 07/07/20  2234   POCTGLUCOSE 135*    Hemoglobin A1C   Date Value Ref Range Status   10/06/2012 5.9 4.0 - 6.2 % Final        Needs from Care Team:  Monitor H/H, comfort     ICU LOS 4h  Level of Care: Critical Care

## 2020-07-08 NOTE — PROGRESS NOTES
[facility]  ICU Shift Summary  Date: 7/8/2020      COVID Test: (--)  Isolation: No active isolations     Prehospitalization: Home  Admit Date / LOS : 7/7/2020/ 1 days    Diagnosis: Pregnancy    Consults:        Active: N/A       Needed: N/A     Code Status: Prior   Advanced Directive: <no information>    LDA: PIV       Central Lines/Site/Justification:Patient Does Not Have Central Line       Urinary Cath/Order/Justification:Patient Does Not Have Urinary Catheter    Vasopressors/Infusions:        GOALS: Volume/ Hemodynamic: N/A                     RASS: 0  alert and calm    Pain Management: PO       Pain Controlled: yes     Rhythm: NSR    Respiratory Device: Room Air                  Most Recent SBT/ SAT: N/A       MOVE Screen: PASS    VTE Prophylaxis: Ambulation  Mobility: Ambulatory  Stress Ulcer Prophylaxis: No    Dietary: PO  Tolerance: yes  /  Advancement: yes    I & O (24h):    Intake/Output Summary (Last 24 hours) at 7/8/2020 1624  Last data filed at 7/8/2020 1400  Gross per 24 hour   Intake 862 ml   Output 1040 ml   Net -178 ml        Restraints: No    Significant Dates:  Post Op Date: 7/7/2020  Rescue Date: 7/7/2020  Imaging/ Diagnostics: N/A    Noteworthy Labs:  HNH improved post blood transfusion    CBC/Anemia Labs: Coags:    Recent Labs   Lab 07/08/20  0857 07/08/20  1520   WBC 9.66 9.20   HGB 7.7* 9.7*   HCT 23.8* 30.5*   * 169   MCV 89 89   RDW 13.6 13.6    Recent Labs   Lab 07/08/20  0206   INR 0.9   APTT 24.3        Chemistries:   Recent Labs   Lab 07/08/20  0206      K 3.9      CO2 23   BUN 14   CREATININE 0.7   CALCIUM 7.8*   PROT 4.5*   BILITOT 0.2   ALKPHOS 51*   ALT 12   AST 21        Cardiac Enzymes: Ejection Fractions:    No results for input(s): CPK, CPKMB, MB, TROPONINI in the last 72 hours. No results found for: EF     POCT Glucose: HbA1c:    Recent Labs   Lab 07/07/20  2234   POCTGLUCOSE 135*    Hemoglobin A1C   Date Value Ref Range Status   10/06/2012  5.9 4.0 - 6.2 % Final           ICU LOS 14h  Level of Care: OK to Transfer    Shift Summary/Plan for the shift: Plan to transfer to mother baby. Pt received 2 units prbc's. Urine output improved. Bean cath removed. BM x1 today. PO diet started. Ambulated in room and up to chair, toelrated well. Pain control po. Suboxone added. Surgical incision site intact, dried drainage. Scant vaginal bleeding, no clots. Fundus firm. RhoGAM received.

## 2020-07-08 NOTE — PROGRESS NOTES
[facility]  ICU Multidisciplinary Bedside Rounds     UPDATE     Date: 7/8/2020      Plan of care reviewed with the following, Nurse, Charge Nurse, Physician, Pulm CC and Resp. Therapist.       Needs/ Goals for the day: Pt with decreased urine output this am, 25 ml at 0800, Clear salina. 400 ml total urine output overnight. CBC at 0900, per report notify Primary team once CBC resulted for possible 2nd unit of prbc transfusion. Abd soft, tender at incision site only, pt denies any other complaints. Pt takes suboxone at home, no active orders for medication, will address with primary team, Anesthesia team recommends restarting to prevent withdraw symptoms.      Level of Care: OK to Transfer

## 2020-07-08 NOTE — PROGRESS NOTES
Notified that rapid response had been called.  Patient was up to the restroom for BM with nurse assistance.  Patient had near syncopal episode, eyes rolled back.  Patient was eased to the floor and then helped to the bed.  Never a true LOC.  Patient did not fall.      Rapid response team was present.  Initial BP was initially hypotensive and patient received 2L bolus, narcan and epinephrine. Patient was in trendelenburg.  Patient was responsive and knew who I was. She reports breast pain and lower abdominal pain.  I-stat hct 23.  Glucose 128.  Stat labs with plan to transfuse with 2 units    BP improved to 120/70.    Breast soft, not engorged, no significant tenderness  abd soft appropriately tender, ND, bandage in place  Lochia is scant  UO has been adequate     Spoke with patient's .  He was informed of what happened and indication for transfer to ICU.  He states that patient takes suboxone 8 mg daily, but did not take yesterday 2/2 NPO.    A/p  1.  POD # 1 s/p RTLCS/BTL - op note reviewed .  Drop in admit hct from 34 to 23 on istat.  Plan to transfuse 2 units PBCS.  Will hold toradol and motrin for now.  2.  Hypotensive episode - will transfer to ICU.  Etiology isn't clear but anemia likely contributor.  Coags are pending.  Replace fierro and monitor closely.  Abdomen is soft and nondistended.  Consider CT if doesn't continue to improve.  3. Anemia 2/2 operative blood loss, transfuse.  4.  H/o opiate abuse on suboxone at home. Will need to restart later today once patient is more stable.  5. Rh neg - f/u rhogam studies  6.  Breast pain - doesn't plan to breast feed.  Will need supportive bra.

## 2020-07-08 NOTE — PROGRESS NOTES
0920 Pt's HnH resulted 7.7/23.8. Called and informed MD Hernández on call for OB, primary team. MD states to admin 2nd unit of prbc ordered. MD Barrientos at bedside updated on lab results and recommendations from MD Hernández.  1215 Called MD Hernández to update on pt, pt with c/o pain to incision site to abdomen, no change to abdomen assessment. Pt awake and alert. Urine output improving post 2nd unit of prbc. MD states ok to give po medications and start diet. MD will come to round on pt soon. Vital signs provided to pt including temp 99.2.  1454 Removed fierro cath. Pt tolerated well. Up to chair with minimal assistance. BP stable. Call light and phone within reach with bedside table. Abd binder on while pt out of bed. peripad in place.  1600 pt c/o mild upper back pain bilateral, temp 99.1, pt ambulated, fierro removed, and pt received RhoGAM, notified MD Hernández. New orders received.

## 2020-07-08 NOTE — NURSING
New orders received to hold off on second unit of PRBCs until CBC at 0900 results per MD Shukla. Pt VSS. Will continue to monitor.

## 2020-07-08 NOTE — NURSING
Pt received from mother baby AAO x 4 on 2 L NC. ST on the monitor. BP stable. LR bolus infusing. Bean placed. Pt with c/o abd pain. Minimal bleeding noted. Pt placed on continuous cardiac monitor, blood pressure cuff, and pulse ox. Pt oriented to unit and room. Pt updated on plan of care.

## 2020-07-08 NOTE — SIGNIFICANT EVENT
"Hospital Medicine  Cross-Cover Note        Diagnosis leading to hospitalization: Pregnancy    Length of Stay since admission: 1     HPI - Interval History:       Called by nursing staff for rapid response.  Patient had assistance ambulating to the toilet.  After she had a bowel movement she knocked on the door and then lowered herself to the floor in became minimally responsive.  Palpable pulse but unable to obtain blood pressure until phenylephrine was given emergently in conjunction with IV fluids.  Narcan was also administered.    Patient was transferred to the bed and iSTAT labs obtained which revealed an approximate 4 g decrease in hemoglobin.    Blood pressure improved to 120 over 70s with heart rate of 105.  Saturating 95%.  Responsive and able to communicate but completed the cold uncomfortable with abdominal pain and chest discomfort.    Physical exam and Vitals:  Vitals:    07/07/20 2326 07/07/20 2353 07/08/20 0147 07/08/20 0203   BP: 93/64 107/72 120/77    BP Location:  Left arm     Patient Position:  Lying     Pulse: 94 102 105    Resp:  20     Temp:  98.2 °F (36.8 °C)  98.5 °F (36.9 °C)   TempSrc:  Oral  Oral   SpO2: 97% 100%     Weight:       Height:    5' 9" (1.753 m)       General:  Acutely ill-appearing middle-aged female who is lying in bed.  Transverse lower abdominal surgical scar dressed with scant bleeding.  Lochia present.  Palpable pulse.  Oriented to self, place, and time.  No focal neurologic deficits.  Pale conjunctiva.  Gray ashen appearance.  Soft abdomen with mild tenderness to palpation.  No rebound or guarding.    Current Medications:  Scheduled Meds:   acetaminophen  650 mg Oral Q6H    citalopram  20 mg Oral Daily    docusate sodium  200 mg Oral BID    ferrous sulfate  325 mg Oral BID    mupirocin   Nasal BID    naloxone        oxytocin in lactated ringers  95 bennett-units/min Intravenous Once    prenatal vitamin  1 tablet Oral Daily    zolpidem  10 mg Oral QHS     Continuous " Infusions:   lactated ringers 125 mL/hr at 07/07/20 2018     PRN Meds:.sodium chloride, bisacodyL, diphenhydrAMINE, hydrocortisone, lanolin, magnesium hydroxide 400 mg/5 ml, ondansetron, ondansetron, oxyCODONE, oxyCODONE, oxyCODONE-acetaminophen, oxyCODONE-acetaminophen, pramoxine-hydrocortisone, promethazine (PHENERGAN) IVPB, rho(D) immune globulin, rho(D) immune globulin, senna-docusate 8.6-50 mg, simethicone    Lab Results:     CBC:  Recent Labs   Lab 07/07/20  1120   WBC 6.88   HGB 11.3*      MCV 84   MCH 27.4   MCHC 32.6   RBC 4.13       ISTAT hematocrit = 33    CMP:  No results for input(s): NA, K, CL, CO2, BUN, CREATININE, GLUCOSE, CALCIUM, MG, PHOS, PROT, BILITOT, ALKPHOS, ALT, AST, LIPASE in the last 168 hours.    Invalid input(s): LABALBU    Coagulantion studies  No results for input(s): PROTIME, INR in the last 168 hours.    Cardiac Enzymes  No results for input(s): CPK, CKMB, TROPONINI in the last 168 hours.  No results for input(s): LACTATE in the last 168 hours.      Consults:  IP CONSULT TO ANESTHESIOLOGY     Assessment and Plan:    Active Hospital Problems    Diagnosis  POA    *Pregnancy [Z34.90]  Not Applicable      Resolved Hospital Problems   No resolved problems to display.       Labs were reviewed.  Imaging was reviewed.  Problem listed reviewed and updated where needed.    Additional orders placed:    1. ISTAT for CHem 8 and Hct  2. Type and cross 2 units of PRBCs for transfusion  3. INR  4. CBC  5. CMP  6. Chest x-ray  7. EKG  8. Cardiac enzymes  9. Transfer to ICU for further evaluation and treatment    Adam Lloyd MD, MPH  Hospital Medicine   Pager: [phone]                  Critical care was given for Darlin Silva who has a condition that poses threat to life and bodily function, including:  Loss of consciousness.  Hypotension.  Symptomatic anemia.     Critical care was time spent personally by me on the following activities: development of treatment plan with  patient or surrogate and bedside caregivers, discussions with consultants, evaluation of patient's response to treatment, examination of patient, ordering and performing treatments and interventions, ordering and review of laboratory studies, ordering and review of radiographic studies, pulse oximetry, re-evaluation of patient's condition. This critical care time did not overlap with that of any other provider or involve time for any procedures.    Reviewed: previous chart and vitals  Reviewed previous: labs, chart  Interpretation: labs, VBG      Total Critical Care time: 45 minutes. This excludes time spent performing separately reportable procedures and services.  Counseling/Conference Time: 15 minutes

## 2020-07-08 NOTE — PLAN OF CARE
Pt AAO x 4. On 2 L NC. NSR on the monitor. Afebrile. VSS. Bean in place with good UO. One unit PRBCs infused this shift. Abdomen soft and tender; dressing with small amount of old drainage. Scant vaginal bleeding noted throughout shift. Pt w/o complaints and resting comfortably. Pt free of falls, injuries, and skin breakdown. Pt updated on plan of care.

## 2020-07-08 NOTE — EICU
eICU Physician Brief Note    Chart reviewed. On camera, the patient is asleep, appears in NAD. Darlin is a 34 year old lady who underwent elective  and BTL on , without complications. The patient has had prior . In the evening of  she did receive some IV fluids for hypotension with improvement. Subsequently, during the night the patient ambulated to the BR with assistance and had a BM after which she had what appears to be a near-syncope.   Labs and investigations reviewed. Hgb 8.3 from 11.  Current medications reviewed.  A/P:  Near-syncope, probably a combination of vaso-vagal and volume depletion (or possibly bleeding, though less likely)  Acute drop in Hgb, though no obvious excessive bleeding.  - monitor hemodynamics closely in the ICU  - receiving PRBC  - OB following  - SCDs and ambulate early.
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
## 2020-07-08 NOTE — PLAN OF CARE
Discharge planning assessment completed with patient's assistance.  Patient from home with mother, father, three children, and significant other.  Patient independent and has no OP services. Reported that she uses her GYN for primary care.  EPIC updated with GYN and preferred pharmacy.  Mother prescribed Suboxone.  Infant in 210B.    Case managment will continue to assess and assist with discharge planning.       07/08/20 1636   Discharge Assessment   Assessment Type Discharge Planning Assessment   Assessment information obtained from? Patient   Expected Length of Stay (days) 2   Communicated expected length of stay with patient/caregiver no   Prior to hospitilization cognitive status: Alert/Oriented   Prior to hospitalization functional status: Independent   Current cognitive status: Alert/Oriented   Current Functional Status: Independent   Facility Arrived From: Home with family   Lives With significant other;parent(s)   Able to Return to Prior Arrangements yes   Is patient able to care for self after discharge? Yes   Who are your caregiver(s) and their phone number(s)? Signficant other:  Truong Chakraborty; [phone]; mother - Chichi Shira  [phone]; father-Ren [phone]   Patient's perception of discharge disposition home or selfcare   Readmission Within the Last 30 Days no previous admission in last 30 days   Patient currently being followed by outpatient case management? No   Patient currently receives any other outside agency services? No   Equipment Currently Used at Home none   Part D Coverage n/a   Do you have any problems affording any of your prescribed medications? No   Is the patient taking medications as prescribed? yes   Does the patient have transportation home? Yes   Transportation Anticipated family or friend will provide   Dialysis Name and Scheduled days n/a   Does the patient receive services at the [facility]mad[facility] Clinic? No   Discharge Plan A Home   Discharge Plan B Home   DME Needed Upon  Discharge  none   Patient/Family in Agreement with Plan yes   Althea Hendrickson LMSW, CCM  7/8/20

## 2020-07-08 NOTE — PHYSICIAN QUERY
PT Name: Darlin Silva  MR #: [id number]     Perfusion Diagnosis Clarification     CDS/: JOSÉ MIGUEL Rivera,RNC-MNN         Contact information:[email or web address]  This form is a permanent document in the medical record.     Query Date: July 8, 2020    By submitting this query, we are merely seeking further clarification of documentation.  Please utilize your independent clinical judgment when addressing the question(s) below.  The Medical Record contains the following:  Indicators Supporting Clinical Findings Location in Medical Record   X Acute Illness (e.g. AMI, Sepsis, etc.) s/p RTLCS/BTL   Anemia 2/2 operative blood loss OB Progress note 7/8@319am    Vital Signs       Acidosis documented     X ABGs/Labs Drop in admit hct from 34 to 23 on istat OB Progress note 7/8@319am   X Hypotension or Low Blood Pressure documented Hypotensive episode - will transfer to ICU.  Etiology isn't clear but anemia likely contributor OB Progress note 7/8@319am    Altered Mental Status or Confusion     X Diaphoresis, Cold Extremities or Cyanosis  Pale conjunctiva.  Gray ashen appearance Significant event note 7/8@243am    Oliguria     X Medication/Treatment:  -Vasopressors  -Inotropic Drugs  -IV Fluids   -IV Antibiotics  -Cardiac Assist Devices  -Hemodynamic Monitoring  -Blood/Blood Products patient received 2L bolus, narcan and epinephrine. Patient was in trendelenburg    Stat labs with plan to transfuse with 2 units    will transfer to ICU OB Progress note 7/8@319am   X Other Patient was up to the restroom for BM with nurse assistance.  Patient had near syncopal episode, eyes rolled back.  Patient was eased to the floor and then helped to the bed.  Never a true LOC.  Patient did not fall    Near-syncope, probably a combination of vaso-vagal and volume depletion (or possibly bleeding, though less likely)  Acute drop in Hgb, though no obvious excessive bleeding. OB Progress note 7/8@319am                  Pulmonary ICU  note 7/8@744a       Provider, please specify diagnosis or diagnoses associated with above clinical findings.  [    ] Hemorrhagic Shock   [x  ] Hypovolemic Shock   [    ] Other Shock (please specify): __________   [    ] Shock, Unspecified   [    ] Other Condition (please specify): _________   [  ] Clinically Undetermined     Present on admission (POA) status:   [   ] Yes (Y)                [  ] Clinically Undetermined (W)           [   ] No (N)                  [   ] Documentation insufficient to determine if condition is POA (U)   Please document in your progress notes daily for the duration of treatment until resolved and include in your discharge summary.

## 2020-07-09 LAB
BASOPHILS # BLD AUTO: 0.03 K/UL (ref 0–0.2)
BASOPHILS NFR BLD: 0.4 % (ref 0–1.9)
BUPRENORPHINE UR-MCNC: NORMAL NG/ML
DIFFERENTIAL METHOD: ABNORMAL
EOSINOPHIL # BLD AUTO: 0.2 K/UL (ref 0–0.5)
EOSINOPHIL NFR BLD: 2.2 % (ref 0–8)
ERYTHROCYTE [DISTWIDTH] IN BLOOD BY AUTOMATED COUNT: 13.8 % (ref 11.5–14.5)
FINAL PATHOLOGIC DIAGNOSIS: NORMAL
GROSS: NORMAL
HCT VFR BLD AUTO: 25.7 % (ref 37–48.5)
HGB BLD-MCNC: 8.2 G/DL (ref 12–16)
IMM GRANULOCYTES # BLD AUTO: 0.06 K/UL (ref 0–0.04)
IMM GRANULOCYTES NFR BLD AUTO: 0.7 % (ref 0–0.5)
LYMPHOCYTES # BLD AUTO: 2.3 K/UL (ref 1–4.8)
LYMPHOCYTES NFR BLD: 27.1 % (ref 18–48)
MCH RBC QN AUTO: 28.4 PG (ref 27–31)
MCHC RBC AUTO-ENTMCNC: 31.9 G/DL (ref 32–36)
MCV RBC AUTO: 89 FL (ref 82–98)
MONOCYTES # BLD AUTO: 0.8 K/UL (ref 0.3–1)
MONOCYTES NFR BLD: 8.8 % (ref 4–15)
NEUTROPHILS # BLD AUTO: 5.2 K/UL (ref 1.8–7.7)
NEUTROPHILS NFR BLD: 60.8 % (ref 38–73)
NRBC BLD-RTO: 0 /100 WBC
PLATELET # BLD AUTO: 177 K/UL (ref 150–350)
PMV BLD AUTO: 10.5 FL (ref 9.2–12.9)
RBC # BLD AUTO: 2.89 M/UL (ref 4–5.4)
WBC # BLD AUTO: 8.5 K/UL (ref 3.9–12.7)

## 2020-07-09 PROCEDURE — 63600175 PHARM REV CODE 636 W HCPCS: Performed by: OBSTETRICS & GYNECOLOGY

## 2020-07-09 PROCEDURE — 25000003 PHARM REV CODE 250: Performed by: OBSTETRICS & GYNECOLOGY

## 2020-07-09 PROCEDURE — 11000001 HC ACUTE MED/SURG PRIVATE ROOM

## 2020-07-09 PROCEDURE — 85025 COMPLETE CBC W/AUTO DIFF WBC: CPT

## 2020-07-09 PROCEDURE — 25000003 PHARM REV CODE 250: Performed by: HOSPITALIST

## 2020-07-09 PROCEDURE — 36415 COLL VENOUS BLD VENIPUNCTURE: CPT

## 2020-07-09 RX ADMIN — BUPRENORPHINE AND NALOXONE 4 FILM: 2; .5 FILM BUCCAL; SUBLINGUAL at 09:07

## 2020-07-09 RX ADMIN — IBUPROFEN 600 MG: 600 TABLET, FILM COATED ORAL at 07:07

## 2020-07-09 RX ADMIN — DOCUSATE SODIUM 200 MG: 100 CAPSULE, LIQUID FILLED ORAL at 09:07

## 2020-07-09 RX ADMIN — DIPHENHYDRAMINE HYDROCHLORIDE 25 MG: 25 CAPSULE ORAL at 07:07

## 2020-07-09 RX ADMIN — OXYCODONE HYDROCHLORIDE AND ACETAMINOPHEN 1 TABLET: 5; 325 TABLET ORAL at 10:07

## 2020-07-09 RX ADMIN — IBUPROFEN 600 MG: 600 TABLET, FILM COATED ORAL at 10:07

## 2020-07-09 RX ADMIN — MUPIROCIN: 20 OINTMENT TOPICAL at 09:07

## 2020-07-09 RX ADMIN — FERROUS SULFATE TAB EC 325 MG (65 MG FE EQUIVALENT) 325 MG: 325 (65 FE) TABLET DELAYED RESPONSE at 09:07

## 2020-07-09 RX ADMIN — ACETAMINOPHEN 650 MG: 325 TABLET ORAL at 04:07

## 2020-07-09 RX ADMIN — ZOLPIDEM TARTRATE 10 MG: 5 TABLET ORAL at 09:07

## 2020-07-09 RX ADMIN — CITALOPRAM HYDROBROMIDE 20 MG: 20 TABLET ORAL at 09:07

## 2020-07-09 RX ADMIN — OXYCODONE HYDROCHLORIDE AND ACETAMINOPHEN 1 TABLET: 5; 325 TABLET ORAL at 06:07

## 2020-07-09 RX ADMIN — OXYCODONE HYDROCHLORIDE AND ACETAMINOPHEN 1 TABLET: 5; 325 TABLET ORAL at 02:07

## 2020-07-09 NOTE — PROGRESS NOTES
Delivery Progress Note  Obstetrics        SUBJECTIVE:     Postpartum Day 2:  Delivery    Ms. Silva states she feels well. She denies emotional concerns. Her pain is well controlled with current medications. The baby is well. The patient is ambulating well. Ms. Silva is tolerating a normal diet. Flatus has been passed. Urinary output is adequate.    OBJECTIVE:     Vital Signs (Most Recent):  Temp: 98.3 °F (36.8 °C) (20 0736)  Pulse: 94 (20 0736)  Resp: 18 (20 1038)  BP: 136/64 (20 0736)  SpO2: (!) 93 % (20 0736)    Vital Signs Range (Last 24H):  Temp:  [97.9 °F (36.6 °C)-99.4 °F (37.4 °C)]   Pulse:  [74-95]   Resp:  [15-28]   BP: (119-157)/(58-90)   SpO2:  [93 %-100 %]     I & O (Last 24H):    Intake/Output Summary (Last 24 hours) at 2020 1130  Last data filed at 2020 0634  Gross per 24 hour   Intake 2027 ml   Output 526 ml   Net 1501 ml     Physical Exam:  General:    no distress               Abdomen:  soft, non-tender; bowel sounds normal   Fundus:  firm   Incision:  healing well   Lochia:   scant rubra   DVT Evaluation:  No evidence of DVT on either side seen on physical exam.     Hemoglobin/Hematocrit  Recent Labs   Lab 20  0530   HGB 8.2*   HCT 25.7*     ABO/Rh  Lab Results   Component Value Date    GROUPTRH O NEG 2020     Rubella  No results for input(s): RUBELLAIGGSC in the last 168 hours.    ASSESSMENT/PLAN:     Status post  section. Anemia stable s/p 2 units prbcs. Rh negative s/p rhogam. Depression and anxiety stable on Celexa. Opiate addiction on suboxone. uds positive thc. Doing well postoperatively. Continue current care.  consult.

## 2020-07-09 NOTE — PLAN OF CARE
VSS. Voiding, ambulating. Tolerating regular diet, active bowel sounds, passing flatus. Good pain management with percocet. Bonding with infant, formula feeding. Verbalizes understanding of plan of care with good recall.

## 2020-07-09 NOTE — NURSING TRANSFER
Nursing Transfer Note      7/8/2020     Transfer To: 210    Transfer via wheelchair    Transfer with N/A    Transported by transport    Medicines sent: N?A    Chart send with patient: Yes    Notified: Family notified on room phone by patient.    Report given to SHENG Baeza. Patient transferred to wheelchair easily and independently. Tolerated well. No belongings at bedside, all belongings in previous room, 210 per RN and PT. Incision C/D/I and peripad with minimal drainage. Patient does not complain of pain. Last vital signs stable. No complaints per patient. Transported with transporter only.

## 2020-07-09 NOTE — SUBJECTIVE & OBJECTIVE
Interval History: alert,stable.    Review of Systems   Constitutional: Negative for activity change and appetite change.   Eyes: Negative for pain and discharge.   Respiratory: Negative for apnea.    Gastrointestinal: Negative for abdominal distention.   Musculoskeletal: Negative for arthralgias and back pain.   Psychiatric/Behavioral: Negative for agitation and behavioral problems.     Objective:     Vital Signs (Most Recent):  Temp: 97.9 °F (36.6 °C) (07/09/20 1132)  Pulse: 92 (07/09/20 1132)  Resp: 20 (07/09/20 1132)  BP: 115/60 (07/09/20 1132)  SpO2: 96 % (07/09/20 1132) Vital Signs (24h Range):  Temp:  [97.9 °F (36.6 °C)-99.4 °F (37.4 °C)] 97.9 °F (36.6 °C)  Pulse:  [80-95] 92  Resp:  [15-20] 20  SpO2:  [93 %-100 %] 96 %  BP: (115-154)/(58-85) 115/60     Weight: 119.1 kg (262 lb 9.1 oz)  Body mass index is 38.77 kg/m².    Intake/Output Summary (Last 24 hours) at 7/9/2020 1239  Last data filed at 7/9/2020 1030  Gross per 24 hour   Intake 2070 ml   Output 326 ml   Net 1744 ml      Physical Exam  Constitutional:       Appearance: Normal appearance.   HENT:      Head: Normocephalic.      Nose: Nose normal.      Mouth/Throat:      Mouth: Mucous membranes are moist.   Cardiovascular:      Rate and Rhythm: Normal rate and regular rhythm.      Pulses: Normal pulses.   Pulmonary:      Effort: Pulmonary effort is normal.   Abdominal:      General: Abdomen is flat.      Palpations: Abdomen is soft.   Musculoskeletal: Normal range of motion.   Neurological:      General: No focal deficit present.      Mental Status: She is alert and oriented to person, place, and time.         Significant Labs:   BMP:   Recent Labs   Lab 07/08/20  0206   *      K 3.9      CO2 23   BUN 14   CREATININE 0.7   CALCIUM 7.8*     CBC:   Recent Labs   Lab 07/08/20  0857 07/08/20  1520 07/09/20  0530   WBC 9.66 9.20 8.50   HGB 7.7* 9.7* 8.2*   HCT 23.8* 30.5* 25.7*   * 169 177     CMP:   Recent Labs   Lab 07/08/20  0209       K 3.9      CO2 23   *   BUN 14   CREATININE 0.7   CALCIUM 7.8*   PROT 4.5*   ALBUMIN 2.3*   BILITOT 0.2   ALKPHOS 51*   AST 21   ALT 12   ANIONGAP 7*   EGFRNONAA >60       Significant Imaging: reviewed.

## 2020-07-09 NOTE — PROGRESS NOTES
[facility] Hospital Medicine  Progress Note    Patient Name: Darlin Silva  MRN: [id number]  Patient Class: IP- Inpatient   Admission Date: 2020  Length of Stay: 2 days  Attending Physician: Nikita Her MD  Primary Care Provider: Chad Arambula MD        Subjective:     Principal Problem:Pregnancy        HPI:  Ms Darlin Silva is a 34 y.o. woman who was admitted to OB service on 2020 for  and bilateral tubal ligation for intrauterine growth restriction at 37+ weeks. She had a low transverse  on . Estimated blood loss 700cc. Developed hypotension BP 88/60, diaphoresis, presyncopal episode after walking to the bathroom and having a BM around 11PM on . Given 2L IVF bolus, narcan, phenylephrine. Transferred to ICU on . Labs noted with Hgb 8.4 from pre-op 11.3. Transfused 1U RBCs with decrease in Hgb further to 7.7 Vitals stable. Mental status normal. Scant bleeding along surgical incision site. Also noted decreased platelets to 147 but normal PT/PTT/INR, normal fibrinogen, no elevated TBili, suggesting no hemolysis. Hospital medicine consulted for co-management.     Overview/Hospital Course:  Ms Darlin Silva is a 34 y.o. woman who was admitted to OB service on 2020 for  and bilateral tubal ligation for intrauterine growth restriction at 37+ weeks. She had a low transverse  on . Estimated blood loss 700cc. Developed hypotension BP 88/60, diaphoresis, presyncopal episode after walking to the bathroom and having a BM around 11PM on . Given 2L IVF bolus, narcan, phenylephrine. Transferred to ICU on . Labs noted with Hgb 8.4 from pre-op 11.3. Transfused 1U RBCs with decrease in Hgb further to 7.7 Vitals stable. Mental status normal. Scant bleeding along surgical incision site. Also noted decreased platelets to 147 but normal PT/PTT/INR, normal fibrinogen, no elevated TBili, suggesting no DIC,. Hospital medicine consulted for  co-management,patient's condition  Improved,trnasfered from ICU to OB floor,she is table,alert time 3 ,toal;erating diet,pain is controlled.HH is stable.hemodynamicaly stable.    Interval History: alert,stable.    Review of Systems   Constitutional: Negative for activity change and appetite change.   Eyes: Negative for pain and discharge.   Respiratory: Negative for apnea.    Gastrointestinal: Negative for abdominal distention.   Musculoskeletal: Negative for arthralgias and back pain.   Psychiatric/Behavioral: Negative for agitation and behavioral problems.     Objective:     Vital Signs (Most Recent):  Temp: 97.9 °F (36.6 °C) (07/09/20 1132)  Pulse: 92 (07/09/20 1132)  Resp: 20 (07/09/20 1132)  BP: 115/60 (07/09/20 1132)  SpO2: 96 % (07/09/20 1132) Vital Signs (24h Range):  Temp:  [97.9 °F (36.6 °C)-99.4 °F (37.4 °C)] 97.9 °F (36.6 °C)  Pulse:  [80-95] 92  Resp:  [15-20] 20  SpO2:  [93 %-100 %] 96 %  BP: (115-154)/(58-85) 115/60     Weight: 119.1 kg (262 lb 9.1 oz)  Body mass index is 38.77 kg/m².    Intake/Output Summary (Last 24 hours) at 7/9/2020 1239  Last data filed at 7/9/2020 1030  Gross per 24 hour   Intake 2070 ml   Output 326 ml   Net 1744 ml      Physical Exam  Constitutional:       Appearance: Normal appearance.   HENT:      Head: Normocephalic.      Nose: Nose normal.      Mouth/Throat:      Mouth: Mucous membranes are moist.   Cardiovascular:      Rate and Rhythm: Normal rate and regular rhythm.      Pulses: Normal pulses.   Pulmonary:      Effort: Pulmonary effort is normal.   Abdominal:      General: Abdomen is flat.      Palpations: Abdomen is soft.   Musculoskeletal: Normal range of motion.   Neurological:      General: No focal deficit present.      Mental Status: She is alert and oriented to person, place, and time.         Significant Labs:   BMP:   Recent Labs   Lab 07/08/20  0206   *      K 3.9      CO2 23   BUN 14   CREATININE 0.7   CALCIUM 7.8*     CBC:   Recent Labs    Lab 07/08/20  0857 07/08/20  1520 07/09/20  0530   WBC 9.66 9.20 8.50   HGB 7.7* 9.7* 8.2*   HCT 23.8* 30.5* 25.7*   * 169 177     CMP:   Recent Labs   Lab 07/08/20  0206      K 3.9      CO2 23   *   BUN 14   CREATININE 0.7   CALCIUM 7.8*   PROT 4.5*   ALBUMIN 2.3*   BILITOT 0.2   ALKPHOS 51*   AST 21   ALT 12   ANIONGAP 7*   EGFRNONAA >60       Significant Imaging: reviewed.      Assessment/Plan:      * Pregnancy  S/p csection and tubal ligation on 7/7 by OB  With 700cc EBL noted   With symptomatic acute blood loss anemia-- see anemia  Continue prenatal vitamin  Rest of management per OB      Leukocytosis  Wbc transiently increased to 13, now normalized  No fever. Received preop antibiotics. Continue to monitor.       Thrombocytopenia  Plts decreased from 198 on admit to 147  No other signs of hemolysis  Monitor with CBC Q8      Symptomatic anemia  See acute blood loss anemia  Symptoms have resolved. Anemia has not.     Acute blood loss anemia  Hgb 11.3 preop --> 8.4 post op  Most likely secondary to surgical blood loss  With symptoms- presyncope, hypotension  Transfused 1U RBCs with inappropriate response, Hgb 7.7. Symptoms resolved. No major bleeding noted on exam.   Transfuse another 1U RBCs. OB aware  Plts are low at 147 but coags/fibrinogen/TBili normal--> do not suspect hemolysis. Rh labs pending.   Vitals are stable  CBC Q8  If Hgb decreasing or vitals unstable, recommend CT abd/pelvis with contrast to rule out ongoing bleeding,  .HH is stable.hemodynamicaly stable.      Opioid dependence - on suboxone- plans to taper  On suboxone 8-2 for >1 year with Rx from only one provider  When OK by OB, resume home suboxone dosing        Depression with anxiety  Continue home celexa  EKG reviewed-         VTE Risk Mitigation (From admission, onward)         Ordered     Place sequential compression device  Until discontinued      07/07/20 142                      Nikita Her  MD  Department of Hospital Medicine   [facility]

## 2020-07-09 NOTE — NURSING
Received patient from ICU to room via  Wheelchair. Patient accompanied by transport. Transferred patient to bed. Evaluated general patient appearance and condition. Transfer assessment initiated. Patient is awake and alert, AAOx4. Pain free as this time. Significant other at bedside. Saline lock at L and R forarm flushed, Intact. No apparent distress noted at this time. Bed alarm engaged call light within reach, bed in lowest position. Will continue to monitor.

## 2020-07-09 NOTE — HOSPITAL COURSE
Ms Darlin Silva is a 34 y.o. woman who was admitted to OB service on 2020 for  and bilateral tubal ligation for intrauterine growth restriction at 37+ weeks. She had a low transverse  on . Estimated blood loss 700cc. Developed hypotension BP 88/60, diaphoresis, presyncopal episode after walking to the bathroom and having a BM around 11PM on . Given 2L IVF bolus, narcan, phenylephrine. Transferred to ICU on . Labs noted with Hgb 8.4 from pre-op 11.3. Transfused 1U RBCs with decrease in Hgb further to 7.7 Vitals stable. Mental status normal. Scant bleeding along surgical incision site. Also noted decreased platelets to 147 but normal PT/PTT/INR, normal fibrinogen, no elevated TBili, suggesting no DIC,. Hospital medicine consulted for co-management,patient's condition  Improved,trnasfered from ICU to OB floor,she is table,alert time 3 ,toal;erating diet,pain is controlled.HH is stable.hemodynamicaly stable.

## 2020-07-09 NOTE — PLAN OF CARE
Problem: Adult Inpatient Plan of Care  Goal: Plan of Care Review  7/9/2020 0540 by Felisha Mascorro RN  Outcome: Ongoing, Progressing  7/9/2020 0539 by Felisha Mascorro RN  Outcome: Ongoing, Progressing  7/9/2020 0538 by Felisha Mascorro RN  Outcome: Ongoing, Progressing  Goal: Patient-Specific Goal (Individualization)  7/9/2020 0540 by Felisha Mascorro RN  Outcome: Ongoing, Progressing  7/9/2020 0539 by Felisha Mascorro RN  Outcome: Ongoing, Progressing  7/9/2020 0538 by Felisha Mascorro RN  Outcome: Ongoing, Progressing  Goal: Absence of Hospital-Acquired Illness or Injury  7/9/2020 0540 by Felisha Mascorro RN  Outcome: Ongoing, Progressing  7/9/2020 0539 by Felisha Mascorro RN  Outcome: Ongoing, Progressing  7/9/2020 0538 by Felisha Mascorro RN  Outcome: Ongoing, Progressing  Goal: Optimal Comfort and Wellbeing  7/9/2020 0540 by Felisha Mascorro RN  Outcome: Ongoing, Progressing  7/9/2020 0539 by Felisha Mascorro RN  Outcome: Ongoing, Progressing  7/9/2020 0538 by Felisha Mascorro RN  Outcome: Ongoing, Progressing  Goal: Readiness for Transition of Care  7/9/2020 0540 by Felisha Mascorro RN  Outcome: Ongoing, Progressing  7/9/2020 0539 by Felisha Mascorro RN  Outcome: Ongoing, Progressing

## 2020-07-10 VITALS
HEIGHT: 69 IN | DIASTOLIC BLOOD PRESSURE: 68 MMHG | OXYGEN SATURATION: 100 % | SYSTOLIC BLOOD PRESSURE: 140 MMHG | WEIGHT: 262.56 LBS | HEART RATE: 83 BPM | TEMPERATURE: 99 F | BODY MASS INDEX: 38.89 KG/M2 | RESPIRATION RATE: 20 BRPM

## 2020-07-10 LAB
ALBUMIN SERPL BCP-MCNC: 2.5 G/DL (ref 3.5–5.2)
ALP SERPL-CCNC: 61 U/L (ref 55–135)
ALT SERPL W/O P-5'-P-CCNC: 13 U/L (ref 10–44)
ANION GAP SERPL CALC-SCNC: 7 MMOL/L (ref 8–16)
AST SERPL-CCNC: 19 U/L (ref 10–40)
BASOPHILS # BLD AUTO: 0.02 K/UL (ref 0–0.2)
BASOPHILS NFR BLD: 0.3 % (ref 0–1.9)
BILIRUB SERPL-MCNC: 0.2 MG/DL (ref 0.1–1)
BUN SERPL-MCNC: 8 MG/DL (ref 6–20)
CALCIUM SERPL-MCNC: 8.4 MG/DL (ref 8.7–10.5)
CHLORIDE SERPL-SCNC: 107 MMOL/L (ref 95–110)
CO2 SERPL-SCNC: 26 MMOL/L (ref 23–29)
CREAT SERPL-MCNC: 0.6 MG/DL (ref 0.5–1.4)
DIFFERENTIAL METHOD: ABNORMAL
EOSINOPHIL # BLD AUTO: 0.2 K/UL (ref 0–0.5)
EOSINOPHIL NFR BLD: 3.2 % (ref 0–8)
ERYTHROCYTE [DISTWIDTH] IN BLOOD BY AUTOMATED COUNT: 13.7 % (ref 11.5–14.5)
EST. GFR  (AFRICAN AMERICAN): >60 ML/MIN/1.73 M^2
EST. GFR  (NON AFRICAN AMERICAN): >60 ML/MIN/1.73 M^2
GLUCOSE SERPL-MCNC: 108 MG/DL (ref 70–110)
HCT VFR BLD AUTO: 22.1 % (ref 37–48.5)
HGB BLD-MCNC: 7 G/DL (ref 12–16)
IMM GRANULOCYTES # BLD AUTO: 0.06 K/UL (ref 0–0.04)
IMM GRANULOCYTES NFR BLD AUTO: 1 % (ref 0–0.5)
LYMPHOCYTES # BLD AUTO: 2.1 K/UL (ref 1–4.8)
LYMPHOCYTES NFR BLD: 33.4 % (ref 18–48)
MCH RBC QN AUTO: 28.3 PG (ref 27–31)
MCHC RBC AUTO-ENTMCNC: 31.7 G/DL (ref 32–36)
MCV RBC AUTO: 90 FL (ref 82–98)
MONOCYTES # BLD AUTO: 0.7 K/UL (ref 0.3–1)
MONOCYTES NFR BLD: 11.3 % (ref 4–15)
NEUTROPHILS # BLD AUTO: 3.2 K/UL (ref 1.8–7.7)
NEUTROPHILS NFR BLD: 50.8 % (ref 38–73)
NRBC BLD-RTO: 0 /100 WBC
PLATELET # BLD AUTO: 167 K/UL (ref 150–350)
PMV BLD AUTO: 10 FL (ref 9.2–12.9)
POTASSIUM SERPL-SCNC: 3.6 MMOL/L (ref 3.5–5.1)
PROT SERPL-MCNC: 5.4 G/DL (ref 6–8.4)
RBC # BLD AUTO: 2.47 M/UL (ref 4–5.4)
SODIUM SERPL-SCNC: 140 MMOL/L (ref 136–145)
WBC # BLD AUTO: 6.28 K/UL (ref 3.9–12.7)

## 2020-07-10 PROCEDURE — 63600175 PHARM REV CODE 636 W HCPCS: Performed by: OBSTETRICS & GYNECOLOGY

## 2020-07-10 PROCEDURE — 25000003 PHARM REV CODE 250: Performed by: HOSPITALIST

## 2020-07-10 PROCEDURE — 25000003 PHARM REV CODE 250: Performed by: OBSTETRICS & GYNECOLOGY

## 2020-07-10 PROCEDURE — 85025 COMPLETE CBC W/AUTO DIFF WBC: CPT

## 2020-07-10 PROCEDURE — 36415 COLL VENOUS BLD VENIPUNCTURE: CPT

## 2020-07-10 PROCEDURE — 80053 COMPREHEN METABOLIC PANEL: CPT

## 2020-07-10 RX ORDER — IBUPROFEN 600 MG/1
600 TABLET ORAL EVERY 6 HOURS PRN
Qty: 120 TABLET | Refills: 0 | Status: SHIPPED | OUTPATIENT
Start: 2020-07-10 | End: 2022-09-28

## 2020-07-10 RX ORDER — OXYCODONE AND ACETAMINOPHEN 5; 325 MG/1; MG/1
1 TABLET ORAL EVERY 8 HOURS PRN
Qty: 20 TABLET | Refills: 0 | Status: SHIPPED | OUTPATIENT
Start: 2020-07-10 | End: 2022-09-28

## 2020-07-10 RX ADMIN — OXYCODONE HYDROCHLORIDE AND ACETAMINOPHEN 1 TABLET: 5; 325 TABLET ORAL at 03:07

## 2020-07-10 RX ADMIN — OXYCODONE HYDROCHLORIDE AND ACETAMINOPHEN 1 TABLET: 5; 325 TABLET ORAL at 01:07

## 2020-07-10 RX ADMIN — MUPIROCIN: 20 OINTMENT TOPICAL at 09:07

## 2020-07-10 RX ADMIN — IBUPROFEN 600 MG: 600 TABLET, FILM COATED ORAL at 11:07

## 2020-07-10 RX ADMIN — DOCUSATE SODIUM 200 MG: 100 CAPSULE, LIQUID FILLED ORAL at 09:07

## 2020-07-10 RX ADMIN — FERROUS SULFATE TAB EC 325 MG (65 MG FE EQUIVALENT) 325 MG: 325 (65 FE) TABLET DELAYED RESPONSE at 09:07

## 2020-07-10 RX ADMIN — BUPRENORPHINE AND NALOXONE 4 FILM: 2; .5 FILM BUCCAL; SUBLINGUAL at 09:07

## 2020-07-10 RX ADMIN — CITALOPRAM HYDROBROMIDE 20 MG: 20 TABLET ORAL at 09:07

## 2020-07-10 RX ADMIN — DIPHENHYDRAMINE HYDROCHLORIDE 25 MG: 25 CAPSULE ORAL at 03:07

## 2020-07-10 NOTE — CONSULTS
NICU/MB/LD DISCHARGE ASSESSMENT    NAME:Sabiha Chakraborty  DX:  Birth Hospital:[facility]      Birth Wt:6lb 4oz  Birth Ln:  EGA: 37weeks  LUAN:    DEMOGRAPHICS    Mother: Darlin Silva   Address:[address] [address]  Phone:    Father:Truong Chakraborty   Address:[address] [address]  Phone:[phone]    Signed Birth Certificate:yes    Emergency contacts:Chichi Silva [phone]    Siblings:8    CLINICAL    Pediatrician:Dr. Trujillo   Pharmacy:    SW met with pt's mother and introduced herself to complete NICU assessment. Pt's mother was easily engaged. SW explained her role in . Pt's mother voiced understanding.     DIscharge planning assessment completed. Pt will be residing with parents at current address. Pt's mother has basic essential needs such as crib and carseat. SW inquired about feedings. Mom voiced that she will be bottle feeding pt. Mom has transportation to and from the hospital and for when Pt is discharged home. Mom voiced that Pt's pediatrician will be Dr. Trujillo.    Mom verified Pt's insurance. SW informed Mom of having pt added to medicaid insurance within 30 days. Mom voiced understanding. SW reviewed [facility] Health Plans, SSI, Early Steps, Healthy Start, and Immunizations. Mom voiced understanding.     Mom has no concerns or questions at this time. SW will continue to follow Pt while in the NICU.         Pt sees Dr.Robert Chaudhry on [facility]. She stated he is the DrLee that prescribes her suboxone. She also takes Celexa for her depression. Pt stated she hasn't felt depressed in over a year.

## 2020-07-10 NOTE — PROGRESS NOTES
Postop day 3  Patient doing well, no complaints. Ambulating. Lochia minimal,  denies n/v, denies h/a, vision changes, upper abd pain, chest pain, sob. Pain is controlled. Tolerating diet, PASSING flatus    Patient Active Problem List   Diagnosis    S/P gastric bypass    Depression with anxiety    Opioid dependence - on suboxone- plans to taper    Sedative hypnotic or anxiolytic dependence    Tobacco use disorder     GERD (gastroesophageal reflux disease)    H/O:     Rh negative state in antepartum period    Endometriosis, site unspecified    History of prior pregnancy with IUGR - 1st 2 babies, 3rd baby 8 lb    Vitamin D deficiency    Prenatal care, subsequent pregnancy, third trimester    Varicose veins of leg with swelling, bilateral    Pregnancy    Acute blood loss anemia    Symptomatic anemia    Thrombocytopenia    Leukocytosis       Temp:  [97.6 °F (36.4 °C)-98.5 °F (36.9 °C)] 98.5 °F (36.9 °C)  Pulse:  [76-89] 83  Resp:  [16-20] 16  SpO2:  [100 %] 100 %  BP: (111-144)/(53-78) 140/68  Gen: Alert, orientated  CV: Regular rate  Normal resp effort  Abd: soft nondistended fundus firm and appropriately tender  Incision: clean, dry, intact; No erythema  Ext: no significant edema; nontender calves    Recent Labs   Lab 20  0206  07/10/20  0509   WBC 13.74*   < > 6.28   HGB 8.4*   < > 7.0*   HCT 26.4*   < > 22.1*      < > 167   GROUPTRH O NEG  --   --     < > = values in this interval not displayed.       A&P  34 y.o.  s/p c/d btl  Post op doing well.  Routine post op care.  Anemia- iron for 3 months  Rh neg  Depression- stable, declines meds

## 2020-07-10 NOTE — DISCHARGE SUMMARY
34 y.o.   OB History        6    Para   4    Term   4            AB   1    Living   4       SAB   1    TAB        Ectopic        Multiple   0    Live Births   4               admitted for Delivery. See procedure note. Postpartum course was unremarkable.     Admit date 2020 10:30 AM  D/c date 07/10/2020    Disposition: Home  Activity 6 weeks pelvic rest  Diet: normal  Discharge followup 1 week, if . Follow up in 6 weeks if vaginal delivery  Meds listed separately

## 2020-07-10 NOTE — DISCHARGE INSTRUCTIONS
After a Cesearean Birth    General Discharge Instructions  · May follow a regular diet, unless otherwise discussed with physician.  · Take showers, not baths unless otherwise discussed with physician.  · Activity as tolerated.  · No lifting or heavy exercise for 6 weeks, no driving for 2 weeks, no sexual intercourse, douching or tampons for 6 weeks  · May return to work/school as discussed with physician  · Discuss birth control with physician  · Breast care support bra worn at all times  · Take your RX as directed  · Lactation consultant referral number ( [phone] or [phone])    Call Your Healthcare Provider Right Away If You Have:  · A temperature of 100.4°F or higher.  · If your blood pressure is over 155/105.  · You have difficulty catching your breath or trouble breathing.  · Heavy vaginal bleeding, clots, or vaginal discharge with foul odor. (heavier than menses)  · Persistent nausea or vomiting.  · You gain more than 3 pounds in 3 days.  · Severe headaches not relieved by Tylenol (acetaminophen) or Motrin (ibuprofen)  · Blurry or double vision, see spots or flashing lights.  · Dizziness or fainting.  · New onset swelling or worsening of existing swelling.  · Burning or pain when you urinate.  · No bowel movement for 5 days.  · Redness, warmth, swelling, or pain in the lower leg.  · Redness, discharge, or pain worse than you had in the hospital.  · Burning, pain, red streaks, or lumpy areas in your breasts.  · Cracks, blisters, or blood on your nipples.  · Feelings of extreme sadness or anxiety, or a feeling that you dont want to be with your baby.  · If you have any new or unusual symptoms or have questions or concerns    Some of these symptoms can occur up to 4 to 6 weeks after delivery. This can be a sign of pre-eclampsia, which is a serious disease that can cause stroke, seizures, organ damage, or death. Do not wait to call your doctor or seek medical attention.    Incision Care  · If you have an  aquacele dressing then it stays on for 1 week.  It is waterproof and will be removed at your postop visit.  If it comes off then call your physician and keep the incision clean with warm soapy water.  · Watch your incision for signs of infection, such as increasing redness or drainage or a foul smelling odor.  · For ease of movement, hold a pillow against the incision when you get up from a lying or sitting position, and when you laugh or cough.  · Avoid heavy lifting--nothing heavier than your baby until your doctor instructs you otherwise.       Follow-Up  Schedule a  follow-up exam with your healthcare provider for about 1 week after delivery. Contact your healthcare provider if you think you are having any problems.

## 2020-07-10 NOTE — PLAN OF CARE
Pt. Pain managed with prn medications. Tolerating regular diet, no c/o n/v. Pt is ambulating w/o difficulty, no c/o dizziness or lightheadedness. Passing flatus, BM on 7/7/2020. Voiding w/o difficulty. Heating pad at the bedside. POC reviewed with pt. Bonding well with infant. Vss.

## 2020-07-11 LAB
BUPRENORPHINE CONFIRMATION URINE: 58.6 NG/ML
NORBUPRENORPHINE CONFIRMATION URINE: 147 NG/ML

## 2020-07-12 ENCOUNTER — HOSPITAL ENCOUNTER (EMERGENCY)
Facility: HOSPITAL | Age: 35
Discharge: HOME | End: 2020-07-12
Attending: EMERGENCY MEDICINE
Payer: MEDICAID

## 2020-07-12 VITALS
OXYGEN SATURATION: 100 % | RESPIRATION RATE: 16 BRPM | HEART RATE: 77 BPM | DIASTOLIC BLOOD PRESSURE: 77 MMHG | BODY MASS INDEX: 37.03 KG/M2 | HEIGHT: 69 IN | TEMPERATURE: 98 F | WEIGHT: 250 LBS | SYSTOLIC BLOOD PRESSURE: 123 MMHG

## 2020-07-12 DIAGNOSIS — Z48.89 ENCOUNTER FOR POST SURGICAL WOUND CHECK: Primary | ICD-10-CM

## 2020-07-12 DIAGNOSIS — D64.9 ANEMIA, UNSPECIFIED TYPE: ICD-10-CM

## 2020-07-12 LAB
ABO + RH BLD: NORMAL
ALBUMIN SERPL BCP-MCNC: 2.9 G/DL (ref 3.5–5.2)
ALP SERPL-CCNC: 57 U/L (ref 55–135)
ALT SERPL W/O P-5'-P-CCNC: 11 U/L (ref 10–44)
ANION GAP SERPL CALC-SCNC: 7 MMOL/L (ref 8–16)
AST SERPL-CCNC: 21 U/L (ref 10–40)
BASOPHILS # BLD AUTO: 0.03 K/UL (ref 0–0.2)
BASOPHILS NFR BLD: 0.5 % (ref 0–1.9)
BILIRUB SERPL-MCNC: 0.5 MG/DL (ref 0.1–1)
BLD GP AB SCN CELLS X3 SERPL QL: NORMAL
BLOOD GROUP ANTIBODIES SERPL: NORMAL
BUN SERPL-MCNC: 9 MG/DL (ref 6–20)
CALCIUM SERPL-MCNC: 8.4 MG/DL (ref 8.7–10.5)
CHLORIDE SERPL-SCNC: 106 MMOL/L (ref 95–110)
CO2 SERPL-SCNC: 24 MMOL/L (ref 23–29)
CREAT SERPL-MCNC: 0.6 MG/DL (ref 0.5–1.4)
DIFFERENTIAL METHOD: ABNORMAL
EOSINOPHIL # BLD AUTO: 0.2 K/UL (ref 0–0.5)
EOSINOPHIL NFR BLD: 2.6 % (ref 0–8)
ERYTHROCYTE [DISTWIDTH] IN BLOOD BY AUTOMATED COUNT: 13.5 % (ref 11.5–14.5)
EST. GFR  (AFRICAN AMERICAN): >60 ML/MIN/1.73 M^2
EST. GFR  (NON AFRICAN AMERICAN): >60 ML/MIN/1.73 M^2
GLUCOSE SERPL-MCNC: 81 MG/DL (ref 70–110)
HCT VFR BLD AUTO: 24.8 % (ref 37–48.5)
HGB BLD-MCNC: 7.9 G/DL (ref 12–16)
IMM GRANULOCYTES # BLD AUTO: 0.05 K/UL (ref 0–0.04)
IMM GRANULOCYTES NFR BLD AUTO: 0.8 % (ref 0–0.5)
LYMPHOCYTES # BLD AUTO: 2.1 K/UL (ref 1–4.8)
LYMPHOCYTES NFR BLD: 34 % (ref 18–48)
MCH RBC QN AUTO: 28.7 PG (ref 27–31)
MCHC RBC AUTO-ENTMCNC: 31.9 G/DL (ref 32–36)
MCV RBC AUTO: 90 FL (ref 82–98)
MONOCYTES # BLD AUTO: 0.6 K/UL (ref 0.3–1)
MONOCYTES NFR BLD: 8.9 % (ref 4–15)
NEUTROPHILS # BLD AUTO: 3.3 K/UL (ref 1.8–7.7)
NEUTROPHILS NFR BLD: 53.2 % (ref 38–73)
NRBC BLD-RTO: 0 /100 WBC
PLATELET # BLD AUTO: 310 K/UL (ref 150–350)
PMV BLD AUTO: 9.1 FL (ref 9.2–12.9)
POTASSIUM SERPL-SCNC: 4 MMOL/L (ref 3.5–5.1)
PROT SERPL-MCNC: 6.2 G/DL (ref 6–8.4)
RBC # BLD AUTO: 2.75 M/UL (ref 4–5.4)
SARS-COV-2 RDRP RESP QL NAA+PROBE: NEGATIVE
SODIUM SERPL-SCNC: 137 MMOL/L (ref 136–145)
WBC # BLD AUTO: 6.2 K/UL (ref 3.9–12.7)

## 2020-07-12 PROCEDURE — 80053 COMPREHEN METABOLIC PANEL: CPT

## 2020-07-12 PROCEDURE — 85025 COMPLETE CBC W/AUTO DIFF WBC: CPT

## 2020-07-12 PROCEDURE — 99285 EMERGENCY DEPT VISIT HI MDM: CPT | Mod: 25

## 2020-07-12 PROCEDURE — 96376 TX/PRO/DX INJ SAME DRUG ADON: CPT

## 2020-07-12 PROCEDURE — 25500020 PHARM REV CODE 255: Performed by: EMERGENCY MEDICINE

## 2020-07-12 PROCEDURE — 63600175 PHARM REV CODE 636 W HCPCS: Performed by: NURSE PRACTITIONER

## 2020-07-12 PROCEDURE — 96361 HYDRATE IV INFUSION ADD-ON: CPT

## 2020-07-12 PROCEDURE — 86870 RBC ANTIBODY IDENTIFICATION: CPT

## 2020-07-12 PROCEDURE — 25000003 PHARM REV CODE 250: Performed by: NURSE PRACTITIONER

## 2020-07-12 PROCEDURE — 86901 BLOOD TYPING SEROLOGIC RH(D): CPT

## 2020-07-12 PROCEDURE — U0002 COVID-19 LAB TEST NON-CDC: HCPCS

## 2020-07-12 PROCEDURE — 96374 THER/PROPH/DIAG INJ IV PUSH: CPT

## 2020-07-12 RX ORDER — MORPHINE SULFATE 10 MG/ML
4 INJECTION INTRAMUSCULAR; INTRAVENOUS; SUBCUTANEOUS
Status: COMPLETED | OUTPATIENT
Start: 2020-07-12 | End: 2020-07-12

## 2020-07-12 RX ORDER — ZOLPIDEM TARTRATE 10 MG/1
10 TABLET ORAL NIGHTLY PRN
Status: ON HOLD | COMMUNITY
End: 2022-09-28 | Stop reason: HOSPADM

## 2020-07-12 RX ADMIN — MORPHINE SULFATE 4 MG: 10 INJECTION INTRAVENOUS at 06:07

## 2020-07-12 RX ADMIN — IOHEXOL 100 ML: 350 INJECTION, SOLUTION INTRAVENOUS at 07:07

## 2020-07-12 RX ADMIN — SODIUM CHLORIDE 1000 ML: 0.9 INJECTION, SOLUTION INTRAVENOUS at 06:07

## 2020-07-12 RX ADMIN — MORPHINE SULFATE 4 MG: 10 INJECTION INTRAVENOUS at 07:07

## 2020-07-12 NOTE — ED TRIAGE NOTES
"Pt. Reports she had a  on the . Pt. Reports site looks infected and painful. Pt. Reports she has a hx of anemia. Pt. Reports she is not breast feeding. Pt. Reports she is seeing " floaters, in my eyes".   Pt. Is noted with a large bruising to her abd area.   "

## 2020-07-13 NOTE — DISCHARGE INSTRUCTIONS
Use your pain meds only as  prescribed.  Call prescribing MD tomorrow for extension of pain meds.  You have been given a medication that may cause drowsiness, do not drive or operate heavy machinery with this medication. Watch for worsening bleeding, redness, warmth or drainage from the wound.  Amount should be decreasing, not increasing. Return to the Emergency department for any worsening or failure to improve, otherwise follow up with your primary care provider.
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
## 2020-07-20 ENCOUNTER — NURSE TRIAGE (OUTPATIENT)
Dept: ADMINISTRATIVE | Facility: CLINIC | Age: 35
End: 2020-07-20

## 2022-09-27 LAB
CTP QC/QA: YES
CTP QC/QA: YES
POC MOLECULAR INFLUENZA A AGN: NEGATIVE
POC MOLECULAR INFLUENZA B AGN: NEGATIVE
SARS-COV-2 RDRP RESP QL NAA+PROBE: NEGATIVE

## 2022-09-27 PROCEDURE — 99285 EMERGENCY DEPT VISIT HI MDM: CPT | Mod: 25

## 2022-09-27 PROCEDURE — 93010 ELECTROCARDIOGRAM REPORT: CPT | Mod: ,,, | Performed by: INTERNAL MEDICINE

## 2022-09-27 PROCEDURE — 87502 INFLUENZA DNA AMP PROBE: CPT

## 2022-09-27 PROCEDURE — 93005 ELECTROCARDIOGRAM TRACING: CPT

## 2022-09-27 PROCEDURE — 93010 EKG 12-LEAD: ICD-10-PCS | Mod: ,,, | Performed by: INTERNAL MEDICINE

## 2022-09-27 PROCEDURE — U0002 COVID-19 LAB TEST NON-CDC: HCPCS

## 2022-09-28 ENCOUNTER — HOSPITAL ENCOUNTER (OUTPATIENT)
Facility: HOSPITAL | Age: 37
Discharge: HOME | End: 2022-09-28
Attending: EMERGENCY MEDICINE | Admitting: EMERGENCY MEDICINE
Payer: MEDICAID

## 2022-09-28 VITALS
BODY MASS INDEX: 33.31 KG/M2 | OXYGEN SATURATION: 96 % | HEIGHT: 69 IN | TEMPERATURE: 99 F | SYSTOLIC BLOOD PRESSURE: 132 MMHG | RESPIRATION RATE: 20 BRPM | WEIGHT: 224.88 LBS | DIASTOLIC BLOOD PRESSURE: 66 MMHG | HEART RATE: 94 BPM

## 2022-09-28 DIAGNOSIS — D64.9 SYMPTOMATIC ANEMIA: Primary | ICD-10-CM

## 2022-09-28 DIAGNOSIS — J12.9 VIRAL PNEUMONIA: ICD-10-CM

## 2022-09-28 DIAGNOSIS — R06.02 SHORTNESS OF BREATH: ICD-10-CM

## 2022-09-28 DIAGNOSIS — N92.0 MENORRHAGIA WITH REGULAR CYCLE: ICD-10-CM

## 2022-09-28 DIAGNOSIS — N93.8 DUB (DYSFUNCTIONAL UTERINE BLEEDING): ICD-10-CM

## 2022-09-28 DIAGNOSIS — R07.9 CHEST PAIN: ICD-10-CM

## 2022-09-28 DIAGNOSIS — E87.6 HYPOKALEMIA: ICD-10-CM

## 2022-09-28 DIAGNOSIS — J06.9 VIRAL URI WITH COUGH: ICD-10-CM

## 2022-09-28 PROBLEM — E87.1 HYPONATREMIA: Status: ACTIVE | Noted: 2022-09-28

## 2022-09-28 PROBLEM — J18.9 COMMUNITY ACQUIRED PNEUMONIA: Status: ACTIVE | Noted: 2022-09-28

## 2022-09-28 PROBLEM — E87.5 HYPERKALEMIA: Status: ACTIVE | Noted: 2022-09-28

## 2022-09-28 PROBLEM — E87.5 HYPERKALEMIA: Status: RESOLVED | Noted: 2022-09-28 | Resolved: 2022-09-28

## 2022-09-28 LAB
ABO + RH BLD: NORMAL
ALBUMIN SERPL BCP-MCNC: 3.5 G/DL (ref 3.5–5.2)
ALP SERPL-CCNC: 68 U/L (ref 55–135)
ALT SERPL W/O P-5'-P-CCNC: 12 U/L (ref 10–44)
ANION GAP SERPL CALC-SCNC: 8 MMOL/L (ref 8–16)
AST SERPL-CCNC: 20 U/L (ref 10–40)
BASOPHILS # BLD AUTO: 0.03 K/UL (ref 0–0.2)
BASOPHILS # BLD AUTO: 0.04 K/UL (ref 0–0.2)
BASOPHILS NFR BLD: 0.2 % (ref 0–1.9)
BASOPHILS NFR BLD: 0.2 % (ref 0–1.9)
BILIRUB SERPL-MCNC: 0.3 MG/DL (ref 0.1–1)
BLD GP AB SCN CELLS X3 SERPL QL: NORMAL
BLD PROD TYP BPU: NORMAL
BLOOD UNIT EXPIRATION DATE: NORMAL
BLOOD UNIT TYPE CODE: 9500
BLOOD UNIT TYPE: NORMAL
BUN SERPL-MCNC: 6 MG/DL (ref 6–20)
CALCIUM SERPL-MCNC: 9 MG/DL (ref 8.7–10.5)
CHLORIDE SERPL-SCNC: 102 MMOL/L (ref 95–110)
CO2 SERPL-SCNC: 25 MMOL/L (ref 23–29)
CODING SYSTEM: NORMAL
CREAT SERPL-MCNC: 0.6 MG/DL (ref 0.5–1.4)
D DIMER PPP IA.FEU-MCNC: 0.54 MG/L FEU
DIFFERENTIAL METHOD: ABNORMAL
DIFFERENTIAL METHOD: ABNORMAL
DISPENSE STATUS: NORMAL
EOSINOPHIL # BLD AUTO: 0 K/UL (ref 0–0.5)
EOSINOPHIL # BLD AUTO: 0.1 K/UL (ref 0–0.5)
EOSINOPHIL NFR BLD: 0.1 % (ref 0–8)
EOSINOPHIL NFR BLD: 0.8 % (ref 0–8)
ERYTHROCYTE [DISTWIDTH] IN BLOOD BY AUTOMATED COUNT: 22.5 % (ref 11.5–14.5)
ERYTHROCYTE [DISTWIDTH] IN BLOOD BY AUTOMATED COUNT: 24.7 % (ref 11.5–14.5)
EST. GFR  (NO RACE VARIABLE): >60 ML/MIN/1.73 M^2
FOLATE SERPL-MCNC: 9.4 NG/ML (ref 4–24)
GLUCOSE SERPL-MCNC: 120 MG/DL (ref 70–110)
HCT VFR BLD AUTO: 24.7 % (ref 37–48.5)
HCT VFR BLD AUTO: 26.3 % (ref 37–48.5)
HGB BLD-MCNC: 7 G/DL (ref 12–16)
HGB BLD-MCNC: 7.6 G/DL (ref 12–16)
HIV1+2 IGG SERPL QL IA.RAPID: NORMAL
IMM GRANULOCYTES # BLD AUTO: 0.07 K/UL (ref 0–0.04)
IMM GRANULOCYTES # BLD AUTO: 0.08 K/UL (ref 0–0.04)
IMM GRANULOCYTES NFR BLD AUTO: 0.5 % (ref 0–0.5)
IMM GRANULOCYTES NFR BLD AUTO: 0.5 % (ref 0–0.5)
INR PPP: 1 (ref 0.8–1.2)
IRON SERPL-MCNC: <10 UG/DL (ref 30–160)
LYMPHOCYTES # BLD AUTO: 1.5 K/UL (ref 1–4.8)
LYMPHOCYTES # BLD AUTO: 1.6 K/UL (ref 1–4.8)
LYMPHOCYTES NFR BLD: 11 % (ref 18–48)
LYMPHOCYTES NFR BLD: 9.4 % (ref 18–48)
MAGNESIUM SERPL-MCNC: 1.7 MG/DL (ref 1.6–2.6)
MAGNESIUM SERPL-MCNC: 2 MG/DL (ref 1.6–2.6)
MCH RBC QN AUTO: 16.6 PG (ref 27–31)
MCH RBC QN AUTO: 18 PG (ref 27–31)
MCHC RBC AUTO-ENTMCNC: 28.3 G/DL (ref 32–36)
MCHC RBC AUTO-ENTMCNC: 28.9 G/DL (ref 32–36)
MCV RBC AUTO: 59 FL (ref 82–98)
MCV RBC AUTO: 62 FL (ref 82–98)
MONOCYTES # BLD AUTO: 1 K/UL (ref 0.3–1)
MONOCYTES # BLD AUTO: 1.3 K/UL (ref 0.3–1)
MONOCYTES NFR BLD: 7.4 % (ref 4–15)
MONOCYTES NFR BLD: 7.7 % (ref 4–15)
NEUTROPHILS # BLD AUTO: 10.9 K/UL (ref 1.8–7.7)
NEUTROPHILS # BLD AUTO: 13.7 K/UL (ref 1.8–7.7)
NEUTROPHILS NFR BLD: 80.1 % (ref 38–73)
NEUTROPHILS NFR BLD: 82.1 % (ref 38–73)
NRBC BLD-RTO: 0 /100 WBC
NRBC BLD-RTO: 0 /100 WBC
PHOSPHATE SERPL-MCNC: 2.6 MG/DL (ref 2.7–4.5)
PLATELET # BLD AUTO: 744 K/UL (ref 150–450)
PLATELET # BLD AUTO: 840 K/UL (ref 150–450)
PMV BLD AUTO: 9.1 FL (ref 9.2–12.9)
PMV BLD AUTO: 9.3 FL (ref 9.2–12.9)
POTASSIUM SERPL-SCNC: 3.2 MMOL/L (ref 3.5–5.1)
PROCALCITONIN SERPL IA-MCNC: 0.08 NG/ML
PROT SERPL-MCNC: 7.8 G/DL (ref 6–8.4)
PROTHROMBIN TIME: 10.8 SEC (ref 9–12.5)
RBC # BLD AUTO: 4.21 M/UL (ref 4–5.4)
RBC # BLD AUTO: 4.22 M/UL (ref 4–5.4)
SATURATED IRON: ABNORMAL % (ref 20–50)
SODIUM SERPL-SCNC: 135 MMOL/L (ref 136–145)
TOTAL IRON BINDING CAPACITY: 499 UG/DL (ref 250–450)
TRANS ERYTHROCYTES VOL PATIENT: NORMAL ML
TRANSFERRIN SERPL-MCNC: 337 MG/DL (ref 200–375)
TROPONIN I SERPL DL<=0.01 NG/ML-MCNC: <0.006 NG/ML (ref 0–0.03)
VIT B12 SERPL-MCNC: 811 PG/ML (ref 210–950)
WBC # BLD AUTO: 13.59 K/UL (ref 3.9–12.7)
WBC # BLD AUTO: 16.65 K/UL (ref 3.9–12.7)

## 2022-09-28 PROCEDURE — 84466 ASSAY OF TRANSFERRIN: CPT | Performed by: PHYSICIAN ASSISTANT

## 2022-09-28 PROCEDURE — 86920 COMPATIBILITY TEST SPIN: CPT | Performed by: PHYSICIAN ASSISTANT

## 2022-09-28 PROCEDURE — 86850 RBC ANTIBODY SCREEN: CPT | Performed by: PHYSICIAN ASSISTANT

## 2022-09-28 PROCEDURE — 82746 ASSAY OF FOLIC ACID SERUM: CPT | Performed by: PHYSICIAN ASSISTANT

## 2022-09-28 PROCEDURE — 63600175 PHARM REV CODE 636 W HCPCS: Performed by: PHYSICIAN ASSISTANT

## 2022-09-28 PROCEDURE — 85379 FIBRIN DEGRADATION QUANT: CPT | Performed by: PHYSICIAN ASSISTANT

## 2022-09-28 PROCEDURE — 96361 HYDRATE IV INFUSION ADD-ON: CPT

## 2022-09-28 PROCEDURE — 83735 ASSAY OF MAGNESIUM: CPT | Performed by: PHYSICIAN ASSISTANT

## 2022-09-28 PROCEDURE — 25500020 PHARM REV CODE 255: Performed by: EMERGENCY MEDICINE

## 2022-09-28 PROCEDURE — 99225 PR SUBSEQUENT OBSERVATION CARE,LEVEL II: CPT | Mod: ,,, | Performed by: OBSTETRICS & GYNECOLOGY

## 2022-09-28 PROCEDURE — 94761 N-INVAS EAR/PLS OXIMETRY MLT: CPT

## 2022-09-28 PROCEDURE — 63600175 PHARM REV CODE 636 W HCPCS: Performed by: HOSPITALIST

## 2022-09-28 PROCEDURE — 96375 TX/PRO/DX INJ NEW DRUG ADDON: CPT

## 2022-09-28 PROCEDURE — 25000242 PHARM REV CODE 250 ALT 637 W/ HCPCS: Performed by: PHYSICIAN ASSISTANT

## 2022-09-28 PROCEDURE — 82607 VITAMIN B-12: CPT | Performed by: PHYSICIAN ASSISTANT

## 2022-09-28 PROCEDURE — 85025 COMPLETE CBC W/AUTO DIFF WBC: CPT | Performed by: PHYSICIAN ASSISTANT

## 2022-09-28 PROCEDURE — 84145 PROCALCITONIN (PCT): CPT | Performed by: PHYSICIAN ASSISTANT

## 2022-09-28 PROCEDURE — G0378 HOSPITAL OBSERVATION PER HR: HCPCS

## 2022-09-28 PROCEDURE — 80053 COMPREHEN METABOLIC PANEL: CPT | Performed by: PHYSICIAN ASSISTANT

## 2022-09-28 PROCEDURE — 85025 COMPLETE CBC W/AUTO DIFF WBC: CPT | Mod: 91 | Performed by: HOSPITALIST

## 2022-09-28 PROCEDURE — 96365 THER/PROPH/DIAG IV INF INIT: CPT

## 2022-09-28 PROCEDURE — 85610 PROTHROMBIN TIME: CPT | Performed by: PHYSICIAN ASSISTANT

## 2022-09-28 PROCEDURE — 25000003 PHARM REV CODE 250: Performed by: HOSPITALIST

## 2022-09-28 PROCEDURE — 84484 ASSAY OF TROPONIN QUANT: CPT | Performed by: PHYSICIAN ASSISTANT

## 2022-09-28 PROCEDURE — 25000003 PHARM REV CODE 250: Performed by: PHYSICIAN ASSISTANT

## 2022-09-28 PROCEDURE — 25000003 PHARM REV CODE 250: Performed by: NURSE PRACTITIONER

## 2022-09-28 PROCEDURE — 94640 AIRWAY INHALATION TREATMENT: CPT | Mod: XB

## 2022-09-28 PROCEDURE — 94640 AIRWAY INHALATION TREATMENT: CPT

## 2022-09-28 PROCEDURE — 36430 TRANSFUSION BLD/BLD COMPNT: CPT

## 2022-09-28 PROCEDURE — 83735 ASSAY OF MAGNESIUM: CPT | Mod: 91 | Performed by: NURSE PRACTITIONER

## 2022-09-28 PROCEDURE — 86703 HIV-1/HIV-2 1 RESULT ANTBDY: CPT | Performed by: PHYSICIAN ASSISTANT

## 2022-09-28 PROCEDURE — P9021 RED BLOOD CELLS UNIT: HCPCS | Performed by: PHYSICIAN ASSISTANT

## 2022-09-28 PROCEDURE — 84100 ASSAY OF PHOSPHORUS: CPT | Performed by: NURSE PRACTITIONER

## 2022-09-28 PROCEDURE — 99225 PR SUBSEQUENT OBSERVATION CARE,LEVEL II: ICD-10-PCS | Mod: ,,, | Performed by: OBSTETRICS & GYNECOLOGY

## 2022-09-28 RX ORDER — CITALOPRAM 20 MG/1
40 TABLET, FILM COATED ORAL EVERY MORNING
Status: DISCONTINUED | OUTPATIENT
Start: 2022-09-29 | End: 2022-09-28 | Stop reason: HOSPADM

## 2022-09-28 RX ORDER — MAG HYDROX/ALUMINUM HYD/SIMETH 200-200-20
30 SUSPENSION, ORAL (FINAL DOSE FORM) ORAL EVERY 6 HOURS PRN
Status: DISCONTINUED | OUTPATIENT
Start: 2022-09-28 | End: 2022-09-28 | Stop reason: HOSPADM

## 2022-09-28 RX ORDER — CITALOPRAM 40 MG/1
40 TABLET, FILM COATED ORAL DAILY
COMMUNITY
Start: 2022-09-27

## 2022-09-28 RX ORDER — CITALOPRAM 20 MG/1
20 TABLET, FILM COATED ORAL EVERY MORNING
Status: DISCONTINUED | OUTPATIENT
Start: 2022-09-28 | End: 2022-09-28

## 2022-09-28 RX ORDER — ONDANSETRON 2 MG/ML
4 INJECTION INTRAMUSCULAR; INTRAVENOUS EVERY 6 HOURS PRN
Status: DISCONTINUED | OUTPATIENT
Start: 2022-09-28 | End: 2022-09-28 | Stop reason: HOSPADM

## 2022-09-28 RX ORDER — HYDROCODONE BITARTRATE AND ACETAMINOPHEN 500; 5 MG/1; MG/1
TABLET ORAL
Status: DISCONTINUED | OUTPATIENT
Start: 2022-09-28 | End: 2022-09-28 | Stop reason: HOSPADM

## 2022-09-28 RX ORDER — GUAIFENESIN/DEXTROMETHORPHAN 100-10MG/5
10 SYRUP ORAL EVERY 4 HOURS PRN
Status: DISCONTINUED | OUTPATIENT
Start: 2022-09-28 | End: 2022-09-28 | Stop reason: HOSPADM

## 2022-09-28 RX ORDER — TRANEXAMIC ACID 650 MG/1
1300 TABLET ORAL 3 TIMES DAILY
Qty: 30 TABLET | Refills: 0 | Status: SHIPPED | OUTPATIENT
Start: 2022-09-28 | End: 2022-10-03

## 2022-09-28 RX ORDER — ALBUTEROL SULFATE 90 UG/1
2 AEROSOL, METERED RESPIRATORY (INHALATION) EVERY 4 HOURS PRN
Status: DISCONTINUED | OUTPATIENT
Start: 2022-09-28 | End: 2022-09-28 | Stop reason: HOSPADM

## 2022-09-28 RX ORDER — TALC
6 POWDER (GRAM) TOPICAL NIGHTLY PRN
Status: DISCONTINUED | OUTPATIENT
Start: 2022-09-28 | End: 2022-09-28 | Stop reason: HOSPADM

## 2022-09-28 RX ORDER — ACETAMINOPHEN 325 MG/1
650 TABLET ORAL EVERY 6 HOURS PRN
Status: DISCONTINUED | OUTPATIENT
Start: 2022-09-28 | End: 2022-09-28 | Stop reason: HOSPADM

## 2022-09-28 RX ORDER — ONDANSETRON 2 MG/ML
4 INJECTION INTRAMUSCULAR; INTRAVENOUS EVERY 8 HOURS PRN
Status: DISCONTINUED | OUTPATIENT
Start: 2022-09-28 | End: 2022-09-28 | Stop reason: SDUPTHER

## 2022-09-28 RX ORDER — NALOXONE HCL 0.4 MG/ML
0.02 VIAL (ML) INJECTION
Status: DISCONTINUED | OUTPATIENT
Start: 2022-09-28 | End: 2022-09-28 | Stop reason: HOSPADM

## 2022-09-28 RX ORDER — SODIUM CHLORIDE 0.9 % (FLUSH) 0.9 %
10 SYRINGE (ML) INJECTION EVERY 12 HOURS PRN
Status: DISCONTINUED | OUTPATIENT
Start: 2022-09-28 | End: 2022-09-28 | Stop reason: HOSPADM

## 2022-09-28 RX ORDER — GUAIFENESIN/DEXTROMETHORPHAN 100-10MG/5
10 SYRUP ORAL EVERY 4 HOURS PRN
Status: DISCONTINUED | OUTPATIENT
Start: 2022-09-28 | End: 2022-09-28

## 2022-09-28 RX ORDER — FERROUS SULFATE 325(65) MG
325 TABLET, DELAYED RELEASE (ENTERIC COATED) ORAL 2 TIMES DAILY
Qty: 60 TABLET | Refills: 1 | Status: SHIPPED | OUTPATIENT
Start: 2022-09-28

## 2022-09-28 RX ORDER — KETOROLAC TROMETHAMINE 30 MG/ML
15 INJECTION, SOLUTION INTRAMUSCULAR; INTRAVENOUS
Status: COMPLETED | OUTPATIENT
Start: 2022-09-28 | End: 2022-09-28

## 2022-09-28 RX ORDER — ZOLPIDEM TARTRATE 5 MG/1
5 TABLET ORAL
Status: COMPLETED | OUTPATIENT
Start: 2022-09-28 | End: 2022-09-28

## 2022-09-28 RX ORDER — FERROUS GLUCONATE 324(37.5)
324 TABLET ORAL EVERY OTHER DAY
Status: DISCONTINUED | OUTPATIENT
Start: 2022-09-28 | End: 2022-09-28

## 2022-09-28 RX ORDER — BUPRENORPHINE AND NALOXONE 2; .5 MG/1; MG/1
1 FILM, SOLUBLE BUCCAL; SUBLINGUAL DAILY
Status: DISCONTINUED | OUTPATIENT
Start: 2022-09-28 | End: 2022-09-28 | Stop reason: HOSPADM

## 2022-09-28 RX ORDER — MEDROXYPROGESTERONE ACETATE 10 MG/1
10 TABLET ORAL DAILY
Qty: 30 TABLET | Refills: 0 | Status: SHIPPED | OUTPATIENT
Start: 2022-09-28 | End: 2022-09-28 | Stop reason: HOSPADM

## 2022-09-28 RX ORDER — CITALOPRAM 20 MG/1
20 TABLET, FILM COATED ORAL DAILY
Status: COMPLETED | OUTPATIENT
Start: 2022-09-28 | End: 2022-09-28

## 2022-09-28 RX ORDER — IPRATROPIUM BROMIDE AND ALBUTEROL SULFATE 2.5; .5 MG/3ML; MG/3ML
3 SOLUTION RESPIRATORY (INHALATION)
Status: COMPLETED | OUTPATIENT
Start: 2022-09-28 | End: 2022-09-28

## 2022-09-28 RX ORDER — LANOLIN ALCOHOL/MO/W.PET/CERES
1 CREAM (GRAM) TOPICAL 2 TIMES DAILY
Refills: 1 | Status: DISCONTINUED | OUTPATIENT
Start: 2022-09-28 | End: 2022-09-28 | Stop reason: HOSPADM

## 2022-09-28 RX ADMIN — IPRATROPIUM BROMIDE AND ALBUTEROL SULFATE 3 ML: 2.5; .5 SOLUTION RESPIRATORY (INHALATION) at 05:09

## 2022-09-28 RX ADMIN — IOHEXOL 70 ML: 350 INJECTION, SOLUTION INTRAVENOUS at 02:09

## 2022-09-28 RX ADMIN — AZITHROMYCIN MONOHYDRATE 500 MG: 500 INJECTION, POWDER, LYOPHILIZED, FOR SOLUTION INTRAVENOUS at 05:09

## 2022-09-28 RX ADMIN — CITALOPRAM HYDROBROMIDE 20 MG: 20 TABLET ORAL at 08:09

## 2022-09-28 RX ADMIN — KETOROLAC TROMETHAMINE 15 MG: 30 INJECTION, SOLUTION INTRAMUSCULAR at 02:09

## 2022-09-28 RX ADMIN — ZOLPIDEM TARTRATE 5 MG: 5 TABLET ORAL at 02:09

## 2022-09-28 RX ADMIN — ALBUTEROL SULFATE 2 PUFF: 90 AEROSOL, METERED RESPIRATORY (INHALATION) at 04:09

## 2022-09-28 RX ADMIN — BUPRENORPHINE AND NALOXONE 1 FILM: 2; .5 FILM BUCCAL; SUBLINGUAL at 09:09

## 2022-09-28 RX ADMIN — Medication 324 MG: at 03:09

## 2022-09-28 RX ADMIN — FERROUS SULFATE TAB 325 MG (65 MG ELEMENTAL FE) 1 EACH: 325 (65 FE) TAB at 12:09

## 2022-09-28 RX ADMIN — ACETAMINOPHEN 650 MG: 325 TABLET ORAL at 01:09

## 2022-09-28 RX ADMIN — CITALOPRAM HYDROBROMIDE 20 MG: 20 TABLET ORAL at 09:09

## 2022-09-28 RX ADMIN — SODIUM CHLORIDE 1000 ML: 0.9 INJECTION, SOLUTION INTRAVENOUS at 02:09

## 2022-09-28 RX ADMIN — POTASSIUM BICARBONATE 50 MEQ: 977.5 TABLET, EFFERVESCENT ORAL at 02:09

## 2022-09-28 RX ADMIN — CEFTRIAXONE 1 G: 1 INJECTION, SOLUTION INTRAVENOUS at 04:09

## 2022-09-28 NOTE — PLAN OF CARE
09/28/22 0958   Discharge Planning   Assessment Type Discharge Planning Brief Assessment   Resource/Environmental Concerns none   Support Systems Family members   Equipment Currently Used at Home none   Current Living Arrangements home/apartment/condo   Patient/Family Anticipates Transition to home with family   Patient/Family Anticipated Services at Transition none   DME Needed Upon Discharge  none   Discharge Plan A Home with family  (with instructions to followup)     [id number] - [place], [place] - [address] [address] [facility] [address]  Phone: [phone] Fax: [phone]

## 2022-09-28 NOTE — DISCHARGE INSTRUCTIONS
Take all medications as prescribed.  Eat a strict low salt cardiac diet.  Follow up with your physicians as scheduled - pcp within 1 week and ob/gyn within 1-2 weeks.  Thank you for trusting [facility] and Dr. Riggins with your care.  We are honored that you entrusted us with your healthcare needs. Your satisfaction is very important to us and we hope you have been very pleased with your experience at [facility]. After your discharge you may receive a survey asking you to rate your hospital experience. We encourage you to take the time to complete the survey as your feedback allows us to identify areas for improvement as well as recognize our staff.   We hope that you have received the very best care possible during your hospitalization at [facility], as your satisfaction is our top priority.

## 2022-09-28 NOTE — PROGRESS NOTES
WRITTEN HEALTHCARE DISCHARGE INFORMATION      Things that YOU are RESPONSIBLE for to Manage Your Care At Home:     1. Getting your prescriptions filled.  2. Taking you medications as directed. DO NOT MISS ANY DOSES!  3. Going to your follow-up doctor appointments. This is important because it allows the doctor to monitor your progress and to determine if any changes need to be made to your treatment plan.     If you are unable to make your follow up appointments, please call the number listed and reschedule this appointment.      ____________HELP AT HOME____________________     Experiencing any SIGNS or SYMPTOMS: YOU CAN     Schedule a same day appopintment with your Primary Care Doctor or  you can call [facility] Care Line for 24/7 assistance at [phone]     If you are experience any signs or symptoms that have become severe, Call 911 and come to your nearest Emergency Room.     Thank you for choosing [facility] and allowing us to care for you.   From your care management team:      You should receive a call from [facility]charge [facility] within 48-72 hours to help manage your care after discharge. Please try to make sure that you answer your phone for this important phone call.      Follow-up Information       Jose Huff MD Follow up on 10/3/2022.    Specialties: Obstetrics, Obstetrics and Gynecology  Why: at 2:40pm  Contact information:  [address]  [address]  [place] [address]  [phone]               [facility] Follow up.    Why: please call at time of discharge to schedule follow up and establish care for future medical needs  Contact information:  [address] [facility] [address]  [phone]

## 2022-09-28 NOTE — ASSESSMENT & PLAN NOTE
Leukocytosis    Continuous Cardiac monitoring  Continue IV azithromycin and ceftriaxone  Monitor labs  duonebs prn   Encouraged po fluids  Prn cough meds  Albuterol prn   Rapid HIV pending
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
## 2022-09-28 NOTE — FIRST PROVIDER EVALUATION
Medical screening examination initiated.  I have conducted a focused provider triage encounter, findings are as follows:    Brief history of present illness:  Patient here for SOB, cough, headache, body aches for one week. Patient denies NVD.    There were no vitals filed for this visit.    Pertinent physical exam:  Aox4. No acute distress. In wheelchair at triage.     Brief workup plan:  ekg, chest xray, flu and covid swabs    Preliminary workup initiated; this workup will be continued and followed by the physician or advanced practice provider that is assigned to the patient when roomed.

## 2022-09-28 NOTE — PLAN OF CARE
09/28/22 1411   Final Note   Assessment Type Final Discharge Note   Anticipated Discharge Disposition Home   Hospital Resources/Appts/Education Provided Appointments scheduled and added to AVS   Post-Acute Status   Post-Acute Authorization Other   Other Status No Post-Acute Service Needs   Pts nurse Fidelia notified that the pt can d/c from CM standpoint

## 2022-09-28 NOTE — PHARMACY MED REC
"Admission Medication History     The home medication history was taken by Megha Walker CPhT.      You may go to "Admission" then "Reconcile Home Medications" tabs to review and/or act upon these items.     The home medication list has been updated by the Pharmacy department.   Please read ALL comments highlighted in yellow.   Please address this information as you see fit.    Feel free to contact us if you have any questions or require assistance.      The medications listed below were removed from the home medication list. Please reorder if appropriate:  Patient reports no longer taking the following medication(s):  Advil,Motrin 600 mg tab  Percocet 5-325 mg tab      Medications listed below were obtained from: Patient/family and Analytic software- [employer]  (Not in a hospital admission)        Megha Walker CPhT.  [phone]      "

## 2022-09-28 NOTE — NURSING
Discharge instructions given, verbalized understanding. IV x2 taken out and in tact. Pt informed of where to  prescribed medications. Awaiting family transport for discharge.

## 2022-09-28 NOTE — ASSESSMENT & PLAN NOTE
Menorrhagia    Symptomatic  Transfused 1 unit of PRBCs in ED  Monitor labs, transfuse for  Hgb <7 or symptomatic and <8  Iron supplement   V/S q 4hours  Consult OBGYN

## 2022-09-28 NOTE — HPI
37 y.o. female with past medical history of occasional smoker, anemia, anxiety, bipolar disorder, depression, GERD, endometriosis who presents with chief complaint 3 day headache, productive cough, myalgias, worsenining shortness of breath, worsening dyspnea on exertion.  No fever.  Denies any recent illness or known sick contacts.  Admits to associated nasal congestion.  No odynophagia.  Admits to feeling fatigue, generalized weakness. Denies leg swelling or calf pain, history of VTE, chest pain, syncope, chest pain, pleuritic pain., emesis.  Reports one episode of loose stools yesterday, poor appetite and intake since onset of symptoms. She reports heavy menstrual cycles and is currently menstruating. ED findings: WBCs 16.65, H/H 7.0/24.7, Iron less than 10, TIBC 499, D-dimer 0.54, Na 135, potassium 3.2, glucose 120, CTA of chest shows bilateral pattern of pulmonary opacities that may relate to an infectious/inflammatory process, no acute pulmonary embolus.  Given 1 unit of PRBCs in ED, given cough and CT pattern of multifocal pneumonia, started on IV ABX in ED, given potassium and iron in ED as well.  Pt feels comfortable with admit.Patient admitted to hospital medicine observation service for further medical management.
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
## 2022-09-29 PROBLEM — N93.9 ABNORMAL UTERINE BLEEDING (AUB): Status: ACTIVE | Noted: 2022-09-29
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
## 2022-09-29 NOTE — ASSESSMENT & PLAN NOTE
- Currently bleeding but not as heavy as earlier in the period  - Lysteda PO upon discharge to stop bleeding now so assist with symptomatic anemia  - No estrogen with >35 and smoker   - TVUS ordered while in house  - Follow up as outpatient with OB before next period  - Iron BID

## 2022-09-29 NOTE — HPI
Ms. Katerine Silva is a 37 y.o.  female with past medical history of occasional smoker, anemia, anxiety, bipolar disorder, depression, GERD, endometriosis who presented to the ED with chief complaint  headache, productive cough, nasal congestion, myalgias, worsenining shortness of breath, worsening dyspnea on exertion x3 days. . She denies fever and any recent illness or known sick contacts.      Patient was in the ED being evaluated for flu vs COVID vs pneumonia when she also complained of feeling fatigued with generalized weakness. She has a h/o AUB with heavy periods since delivery of her last child in . She had a C/S with BTL with Dr. Hathaway. She has not seen an OBGYN since that time. She reports that her periods are monthly and lasts 5-6 days. She reports that she can no longer wear pads and is now wearing the disposable panties/diapers. She uses 3 a day. She complains of golf ball sized blood clots.     ED findings: WBCs 16.65, H/H 7.0/24.7, Iron less than 10, TIBC 499, D-dimer 0.54, Na 135, potassium 3.2, glucose 120, CTA of chest shows bilateral pattern of pulmonary opacities that may relate to an infectious/inflammatory process, no acute pulmonary embolus.  Given 1 unit of PRBCs in ED, given cough and CT pattern of multifocal pneumonia, started on IV ABX in ED, given potassium and iron in ED as well.
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
## 2022-09-30 NOTE — DISCHARGE SUMMARY
[facility] Medicine  Discharge Summary      Patient Name: Darlin Silva  MRN: [id number]  Patient Class: OP- Observation  Admission Date: 9/28/2022  Hospital Length of Stay: 0 days  Discharge Date and Time: 9/28/2022  6:30 PM  Attending Physician: No att. providers found   Discharging Provider: Danny Riggins MD  Primary Care Provider: Chad Arambula MD      HPI:   37 y.o. female with past medical history of occasional smoker, anemia, anxiety, bipolar disorder, depression, GERD, endometriosis who presents with chief complaint 3 day headache, productive cough, myalgias, worsenining shortness of breath, worsening dyspnea on exertion.  No fever.  Denies any recent illness or known sick contacts.  Admits to associated nasal congestion.  No odynophagia.  Admits to feeling fatigue, generalized weakness. Denies leg swelling or calf pain, history of VTE, chest pain, syncope, chest pain, pleuritic pain., emesis.  Reports one episode of loose stools yesterday, poor appetite and intake since onset of symptoms. She reports heavy menstrual cycles and is currently menstruating. ED findings: WBCs 16.65, H/H 7.0/24.7, Iron less than 10, TIBC 499, D-dimer 0.54, Na 135, potassium 3.2, glucose 120, CTA of chest shows bilateral pattern of pulmonary opacities that may relate to an infectious/inflammatory process, no acute pulmonary embolus.  Given 1 unit of PRBCs in ED, given cough and CT pattern of multifocal pneumonia, started on IV ABX in ED, given potassium and iron in ED as well.  Pt feels comfortable with admit.Patient admitted to hospital medicine observation service for further medical management.        Hospital Course:   37 year old woman with history of tobacco use, iron deficiency anemia, HARRISON, MDD, bipolar disorder, GERD and endometriosis who presented for evaluation of fatigue and shortness of breath.   She was diagnosed with symptomatic anemia due to abnormal uterine bleeding.  She was transfused 1 unit  PRBC and Hb improved to 7.6 at discharge.  OB/GYN consulted and input appreciated.  Pelvic US obtained and patient was started on lysteda and bid FeSO4.  She was clinically improved and discharged home.  Needs f/u with pcp within 1 week and OBGYN prior to next period.    Goals of Care Treatment Preferences:  Code Status: Full Code      Consults:   Consults (From admission, onward)        Status Ordering Provider     Inpatient consult to Social Work  Once        Provider:  (Not yet assigned)    SOLOMON Donald          No new Assessment & Plan notes have been filed under this hospital service since the last note was generated.  Service: Hospital Medicine    Final Active Diagnoses:    Diagnosis Date Noted POA    PRINCIPAL PROBLEM:  Symptomatic anemia [D64.9] 07/08/2020 Yes    Abnormal uterine bleeding (AUB) [N93.9] 09/29/2022 Yes    Hypokalemia [E87.6] 09/28/2022 Yes    Community acquired pneumonia [J18.9] 09/28/2022 Yes    Hyperkalemia [E87.5] 09/28/2022 Yes    Menorrhagia [N92.0] 09/28/2022 Yes    Leukocytosis [D72.829] 07/08/2020 Yes    Tobacco use disorder 2018 [F17.200] 01/02/2015 Yes     Chronic    Depression with anxiety [F41.8] 08/20/2013 Yes      Problems Resolved During this Admission:       Discharged Condition: stable    Disposition: Home or Self Care    Follow Up:   Follow-up Information     Jose Huff MD Follow up on 10/3/2022.    Specialties: Obstetrics, Obstetrics and Gynecology  Why: at 2:40pm  Contact information:  [address]  [address]  [facility] [address]  [phone]             [facility] Follow up.    Why: please call at time of discharge to schedule follow up and establish care for future medical needs  Contact information:  [address] [place] [address]  [phone]                       Patient Instructions:      Diet Adult Regular     Activity as tolerated       Significant Diagnostic Studies: Labs:   BMP: No results for input(s): GLU, NA, K,  CL, CO2, BUN, CREATININE, CALCIUM, MG in the last 48 hours., CMP No results for input(s): NA, K, CL, CO2, GLU, BUN, CREATININE, CALCIUM, PROT, ALBUMIN, BILITOT, ALKPHOS, AST, ALT, ANIONGAP, ESTGFRAFRICA, EGFRNONAA in the last 48 hours., CBC No results for input(s): WBC, HGB, HCT, PLT in the last 48 hours., INR   Lab Results   Component Value Date    INR 1.0 09/28/2022    INR 0.9 07/08/2020    INR 1.0 12/05/2018   , Lipid Panel   Lab Results   Component Value Date    CHOL 145 08/20/2013    HDL 59 08/20/2013    LDLCALC 76.0 08/20/2013    TRIG 49 08/20/2013    CHOLHDL 40.7 08/20/2013   , Troponin   Recent Labs   Lab 09/28/22  0048   TROPONINI <0.006    and A1C: No results for input(s): HGBA1C in the last 4320 hours.    Pending Diagnostic Studies:     Procedure Component Value Units Date/Time    COVID-19 Routine Screening [[id number]] Collected: 09/27/22 2218    Order Status: Sent Lab Status: In process Updated: 09/27/22 9060    Specimen: Nasopharyngeal          Medications:  Reconciled Home Medications:      Medication List      START taking these medications    ferrous sulfate 325 (65 FE) MG EC tablet  Take 1 tablet (325 mg total) by mouth 2 (two) times daily.  Replaces: ferrous sulfate 325 mg (65 mg iron) Tab tablet     tranexamic acid 650 mg tablet  Commonly known as: LYSTEDA  Take 2 tablets (1,300 mg total) by mouth 3 (three) times daily. for 5 days        CONTINUE taking these medications    citalopram 40 MG tablet  Commonly known as: CeleXA  Take 40 mg by mouth once daily.     SUBOXONE 8-2 mg  Generic drug: buprenorphine-naloxone 8-2 mg  Take 8 mg by mouth once daily.        STOP taking these medications    ferrous sulfate 325 mg (65 mg iron) Tab tablet  Commonly known as: FEOSOL  Replaced by: ferrous sulfate 325 (65 FE) MG EC tablet     zolpidem 10 mg Tab  Commonly known as: AMBIEN            Indwelling Lines/Drains at time of discharge:   Lines/Drains/Airways     None                 Time spent on the discharge  of patient: 35 minutes         Danny Riggins MD  Department of Hospital Medicine  [facility] [place] - Telemetry

## 2023-01-02 PROBLEM — J18.9 COMMUNITY ACQUIRED PNEUMONIA: Status: RESOLVED | Noted: 2022-09-28 | Resolved: 2023-01-02

## 2023-10-24 ENCOUNTER — HOSPITAL ENCOUNTER (EMERGENCY)
Facility: HOSPITAL | Age: 38
Discharge: HOME | End: 2023-10-24
Attending: EMERGENCY MEDICINE
Payer: MEDICAID

## 2023-10-24 VITALS
WEIGHT: 240 LBS | DIASTOLIC BLOOD PRESSURE: 83 MMHG | SYSTOLIC BLOOD PRESSURE: 135 MMHG | HEART RATE: 81 BPM | OXYGEN SATURATION: 99 % | HEIGHT: 70 IN | BODY MASS INDEX: 34.36 KG/M2 | RESPIRATION RATE: 18 BRPM | TEMPERATURE: 98 F

## 2023-10-24 DIAGNOSIS — R06.02 SHORT OF BREATH ON EXERTION: ICD-10-CM

## 2023-10-24 DIAGNOSIS — N93.8 DUB (DYSFUNCTIONAL UTERINE BLEEDING): Primary | ICD-10-CM

## 2023-10-24 DIAGNOSIS — R06.02 SHORTNESS OF BREATH ON EXERTION: ICD-10-CM

## 2023-10-24 DIAGNOSIS — R10.30 LOWER ABDOMINAL PAIN: ICD-10-CM

## 2023-10-24 LAB
ABO + RH BLD: NORMAL
ALBUMIN SERPL BCP-MCNC: 4 G/DL (ref 3.5–5.2)
ALP SERPL-CCNC: 48 U/L (ref 55–135)
ALT SERPL W/O P-5'-P-CCNC: 10 U/L (ref 10–44)
ANION GAP SERPL CALC-SCNC: 10 MMOL/L (ref 8–16)
APTT PPP: 29.3 SEC (ref 21–32)
AST SERPL-CCNC: 19 U/L (ref 10–40)
B-HCG UR QL: NEGATIVE
BACTERIA #/AREA URNS HPF: ABNORMAL /HPF
BACTERIA GENITAL QL WET PREP: ABNORMAL
BASOPHILS # BLD AUTO: 0.01 K/UL (ref 0–0.2)
BASOPHILS NFR BLD: 0.2 % (ref 0–1.9)
BILIRUB SERPL-MCNC: 0.5 MG/DL (ref 0.1–1)
BILIRUB UR QL STRIP: NEGATIVE
BLD GP AB SCN CELLS X3 SERPL QL: NORMAL
BUN SERPL-MCNC: 14 MG/DL (ref 6–20)
CALCIUM SERPL-MCNC: 9.3 MG/DL (ref 8.7–10.5)
CHLORIDE SERPL-SCNC: 105 MMOL/L (ref 95–110)
CLARITY UR: ABNORMAL
CLUE CELLS VAG QL WET PREP: ABNORMAL
CO2 SERPL-SCNC: 22 MMOL/L (ref 23–29)
COLOR UR: ABNORMAL
CREAT SERPL-MCNC: 0.7 MG/DL (ref 0.5–1.4)
CTP QC/QA: YES
DIFFERENTIAL METHOD: ABNORMAL
EOSINOPHIL # BLD AUTO: 0 K/UL (ref 0–0.5)
EOSINOPHIL NFR BLD: 0.4 % (ref 0–8)
ERYTHROCYTE [DISTWIDTH] IN BLOOD BY AUTOMATED COUNT: 13.7 % (ref 11.5–14.5)
EST. GFR  (NO RACE VARIABLE): >60 ML/MIN/1.73 M^2
FILAMENT FUNGI VAG WET PREP-#/AREA: ABNORMAL
GLUCOSE SERPL-MCNC: 103 MG/DL (ref 70–110)
GLUCOSE UR QL STRIP: ABNORMAL
HCT VFR BLD AUTO: 38.9 % (ref 37–48.5)
HGB BLD-MCNC: 12.2 G/DL (ref 12–16)
HGB UR QL STRIP: ABNORMAL
HYALINE CASTS #/AREA URNS LPF: 0 /LPF
IMM GRANULOCYTES # BLD AUTO: 0.01 K/UL (ref 0–0.04)
IMM GRANULOCYTES NFR BLD AUTO: 0.2 % (ref 0–0.5)
INR PPP: 1 (ref 0.8–1.2)
KETONES UR QL STRIP: ABNORMAL
LEUKOCYTE ESTERASE UR QL STRIP: ABNORMAL
LYMPHOCYTES # BLD AUTO: 1.7 K/UL (ref 1–4.8)
LYMPHOCYTES NFR BLD: 36.7 % (ref 18–48)
MCH RBC QN AUTO: 25.1 PG (ref 27–31)
MCHC RBC AUTO-ENTMCNC: 31.4 G/DL (ref 32–36)
MCV RBC AUTO: 80 FL (ref 82–98)
MICROSCOPIC COMMENT: ABNORMAL
MONOCYTES # BLD AUTO: 0.4 K/UL (ref 0.3–1)
MONOCYTES NFR BLD: 8.1 % (ref 4–15)
NEUTROPHILS # BLD AUTO: 2.5 K/UL (ref 1.8–7.7)
NEUTROPHILS NFR BLD: 54.4 % (ref 38–73)
NITRITE UR QL STRIP: NEGATIVE
NRBC BLD-RTO: 0 /100 WBC
PH UR STRIP: 6 [PH] (ref 5–8)
PLATELET # BLD AUTO: 426 K/UL (ref 150–450)
PMV BLD AUTO: 10.2 FL (ref 9.2–12.9)
POTASSIUM SERPL-SCNC: 3.3 MMOL/L (ref 3.5–5.1)
PROT SERPL-MCNC: 8 G/DL (ref 6–8.4)
PROT UR QL STRIP: ABNORMAL
PROTHROMBIN TIME: 10.9 SEC (ref 9–12.5)
RBC # BLD AUTO: 4.87 M/UL (ref 4–5.4)
RBC #/AREA URNS HPF: >100 /HPF (ref 0–4)
SODIUM SERPL-SCNC: 137 MMOL/L (ref 136–145)
SP GR UR STRIP: >1.03 (ref 1–1.03)
SPECIMEN OUTDATE: NORMAL
SPECIMEN SOURCE: ABNORMAL
SQUAMOUS #/AREA URNS HPF: 2 /HPF
T VAGINALIS GENITAL QL WET PREP: ABNORMAL
URN SPEC COLLECT METH UR: ABNORMAL
UROBILINOGEN UR STRIP-ACNC: ABNORMAL EU/DL
WBC # BLD AUTO: 4.55 K/UL (ref 3.9–12.7)
WBC #/AREA URNS HPF: 0 /HPF (ref 0–5)
WBC #/AREA VAG WET PREP: ABNORMAL
YEAST GENITAL QL WET PREP: ABNORMAL

## 2023-10-24 PROCEDURE — 96374 THER/PROPH/DIAG INJ IV PUSH: CPT

## 2023-10-24 PROCEDURE — 63600175 PHARM REV CODE 636 W HCPCS

## 2023-10-24 PROCEDURE — 96376 TX/PRO/DX INJ SAME DRUG ADON: CPT

## 2023-10-24 PROCEDURE — 93010 EKG 12-LEAD: ICD-10-PCS | Mod: ,,, | Performed by: INTERNAL MEDICINE

## 2023-10-24 PROCEDURE — 85610 PROTHROMBIN TIME: CPT

## 2023-10-24 PROCEDURE — 87591 N.GONORRHOEAE DNA AMP PROB: CPT

## 2023-10-24 PROCEDURE — 85025 COMPLETE CBC W/AUTO DIFF WBC: CPT

## 2023-10-24 PROCEDURE — 87491 CHLMYD TRACH DNA AMP PROBE: CPT

## 2023-10-24 PROCEDURE — 96361 HYDRATE IV INFUSION ADD-ON: CPT

## 2023-10-24 PROCEDURE — 93005 ELECTROCARDIOGRAM TRACING: CPT

## 2023-10-24 PROCEDURE — 81000 URINALYSIS NONAUTO W/SCOPE: CPT

## 2023-10-24 PROCEDURE — 80053 COMPREHEN METABOLIC PANEL: CPT

## 2023-10-24 PROCEDURE — 25000003 PHARM REV CODE 250

## 2023-10-24 PROCEDURE — 81025 URINE PREGNANCY TEST: CPT

## 2023-10-24 PROCEDURE — 96375 TX/PRO/DX INJ NEW DRUG ADDON: CPT

## 2023-10-24 PROCEDURE — 99285 EMERGENCY DEPT VISIT HI MDM: CPT | Mod: 25

## 2023-10-24 PROCEDURE — 85730 THROMBOPLASTIN TIME PARTIAL: CPT

## 2023-10-24 PROCEDURE — 86850 RBC ANTIBODY SCREEN: CPT

## 2023-10-24 PROCEDURE — 87210 SMEAR WET MOUNT SALINE/INK: CPT

## 2023-10-24 PROCEDURE — 93010 ELECTROCARDIOGRAM REPORT: CPT | Mod: ,,, | Performed by: INTERNAL MEDICINE

## 2023-10-24 RX ORDER — ONDANSETRON 2 MG/ML
4 INJECTION INTRAMUSCULAR; INTRAVENOUS
Status: COMPLETED | OUTPATIENT
Start: 2023-10-24 | End: 2023-10-24

## 2023-10-24 RX ORDER — HYDROCODONE BITARTRATE AND ACETAMINOPHEN 5; 325 MG/1; MG/1
1 TABLET ORAL EVERY 6 HOURS PRN
Qty: 12 TABLET | Refills: 0 | Status: SHIPPED | OUTPATIENT
Start: 2023-10-24

## 2023-10-24 RX ORDER — HYDROXYZINE PAMOATE 25 MG/1
50 CAPSULE ORAL
Status: COMPLETED | OUTPATIENT
Start: 2023-10-24 | End: 2023-10-24

## 2023-10-24 RX ORDER — ONDANSETRON 4 MG/1
4 TABLET, ORALLY DISINTEGRATING ORAL EVERY 6 HOURS PRN
Qty: 15 TABLET | Refills: 0 | Status: SHIPPED | OUTPATIENT
Start: 2023-10-24 | End: 2023-10-24 | Stop reason: SDUPTHER

## 2023-10-24 RX ORDER — ONDANSETRON 4 MG/1
4 TABLET, ORALLY DISINTEGRATING ORAL EVERY 6 HOURS PRN
Qty: 15 TABLET | Refills: 0 | Status: SHIPPED | OUTPATIENT
Start: 2023-10-24

## 2023-10-24 RX ORDER — HYDROCODONE BITARTRATE AND ACETAMINOPHEN 5; 325 MG/1; MG/1
1 TABLET ORAL EVERY 6 HOURS PRN
Qty: 12 TABLET | Refills: 0 | Status: SHIPPED | OUTPATIENT
Start: 2023-10-24 | End: 2023-10-24 | Stop reason: SDUPTHER

## 2023-10-24 RX ORDER — MORPHINE SULFATE 4 MG/ML
4 INJECTION, SOLUTION INTRAMUSCULAR; INTRAVENOUS
Status: COMPLETED | OUTPATIENT
Start: 2023-10-24 | End: 2023-10-24

## 2023-10-24 RX ADMIN — HYDROXYZINE PAMOATE 50 MG: 25 CAPSULE ORAL at 03:10

## 2023-10-24 RX ADMIN — ONDANSETRON 4 MG: 2 INJECTION INTRAMUSCULAR; INTRAVENOUS at 11:10

## 2023-10-24 RX ADMIN — SODIUM CHLORIDE 1000 ML: 9 INJECTION, SOLUTION INTRAVENOUS at 11:10

## 2023-10-24 RX ADMIN — MORPHINE SULFATE 4 MG: 4 INJECTION, SOLUTION INTRAMUSCULAR; INTRAVENOUS at 11:10

## 2023-10-24 RX ADMIN — MORPHINE SULFATE 4 MG: 4 INJECTION, SOLUTION INTRAMUSCULAR; INTRAVENOUS at 03:10
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
## 2023-10-24 NOTE — DISCHARGE INSTRUCTIONS

## 2023-10-25 LAB
C TRACH DNA SPEC QL NAA+PROBE: NOT DETECTED
N GONORRHOEA DNA SPEC QL NAA+PROBE: NOT DETECTED

## 2024-05-14 ENCOUNTER — HOSPITAL ENCOUNTER (EMERGENCY)
Facility: HOSPITAL | Age: 39
Discharge: HOME | End: 2024-05-14
Attending: EMERGENCY MEDICINE
Payer: MEDICAID

## 2024-05-14 VITALS
DIASTOLIC BLOOD PRESSURE: 70 MMHG | TEMPERATURE: 99 F | RESPIRATION RATE: 18 BRPM | BODY MASS INDEX: 34.07 KG/M2 | HEIGHT: 69 IN | HEART RATE: 84 BPM | WEIGHT: 230 LBS | OXYGEN SATURATION: 98 % | SYSTOLIC BLOOD PRESSURE: 129 MMHG

## 2024-05-14 DIAGNOSIS — F41.9 ANXIETY: ICD-10-CM

## 2024-05-14 DIAGNOSIS — R52 PAIN: ICD-10-CM

## 2024-05-14 DIAGNOSIS — G47.00 INSOMNIA, UNSPECIFIED TYPE: ICD-10-CM

## 2024-05-14 DIAGNOSIS — R07.9 CHEST PAIN: ICD-10-CM

## 2024-05-14 DIAGNOSIS — J18.9 PNEUMONIA OF LEFT UPPER LOBE DUE TO INFECTIOUS ORGANISM: Primary | ICD-10-CM

## 2024-05-14 LAB
ALBUMIN SERPL BCP-MCNC: 2.6 G/DL (ref 3.5–5.2)
ALP SERPL-CCNC: 61 U/L (ref 55–135)
ALT SERPL W/O P-5'-P-CCNC: 8 U/L (ref 10–44)
ANION GAP SERPL CALC-SCNC: 8 MMOL/L (ref 8–16)
AST SERPL-CCNC: 12 U/L (ref 10–40)
B-HCG UR QL: NEGATIVE
BASOPHILS # BLD AUTO: 0.03 K/UL (ref 0–0.2)
BASOPHILS NFR BLD: 0.3 % (ref 0–1.9)
BILIRUB SERPL-MCNC: 0.2 MG/DL (ref 0.1–1)
BNP SERPL-MCNC: 39 PG/ML (ref 0–99)
BUN SERPL-MCNC: 9 MG/DL (ref 6–20)
CALCIUM SERPL-MCNC: 8.9 MG/DL (ref 8.7–10.5)
CHLORIDE SERPL-SCNC: 106 MMOL/L (ref 95–110)
CO2 SERPL-SCNC: 25 MMOL/L (ref 23–29)
CREAT SERPL-MCNC: 0.6 MG/DL (ref 0.5–1.4)
CTP QC/QA: YES
DIFFERENTIAL METHOD BLD: ABNORMAL
EOSINOPHIL # BLD AUTO: 0.3 K/UL (ref 0–0.5)
EOSINOPHIL NFR BLD: 2.4 % (ref 0–8)
ERYTHROCYTE [DISTWIDTH] IN BLOOD BY AUTOMATED COUNT: 16.6 % (ref 11.5–14.5)
EST. GFR  (NO RACE VARIABLE): >60 ML/MIN/1.73 M^2
GLUCOSE SERPL-MCNC: 96 MG/DL (ref 70–110)
HCT VFR BLD AUTO: 28.7 % (ref 37–48.5)
HGB BLD-MCNC: 8.9 G/DL (ref 12–16)
IMM GRANULOCYTES # BLD AUTO: 0.13 K/UL (ref 0–0.04)
IMM GRANULOCYTES NFR BLD AUTO: 1.2 % (ref 0–0.5)
LYMPHOCYTES # BLD AUTO: 1.9 K/UL (ref 1–4.8)
LYMPHOCYTES NFR BLD: 17.8 % (ref 18–48)
MCH RBC QN AUTO: 21.8 PG (ref 27–31)
MCHC RBC AUTO-ENTMCNC: 31 G/DL (ref 32–36)
MCV RBC AUTO: 70 FL (ref 82–98)
MONOCYTES # BLD AUTO: 0.7 K/UL (ref 0.3–1)
MONOCYTES NFR BLD: 6.6 % (ref 4–15)
NEUTROPHILS # BLD AUTO: 7.5 K/UL (ref 1.8–7.7)
NEUTROPHILS NFR BLD: 71.7 % (ref 38–73)
NRBC BLD-RTO: 0 /100 WBC
OHS QRS DURATION: 82 MS
OHS QTC CALCULATION: 470 MS
PLATELET # BLD AUTO: 485 K/UL (ref 150–450)
PMV BLD AUTO: 9.9 FL (ref 9.2–12.9)
POTASSIUM SERPL-SCNC: 3.8 MMOL/L (ref 3.5–5.1)
PROT SERPL-MCNC: 7.7 G/DL (ref 6–8.4)
RBC # BLD AUTO: 4.09 M/UL (ref 4–5.4)
SODIUM SERPL-SCNC: 139 MMOL/L (ref 136–145)
TROPONIN I SERPL DL<=0.01 NG/ML-MCNC: <0.006 NG/ML (ref 0–0.03)
WBC # BLD AUTO: 10.44 K/UL (ref 3.9–12.7)

## 2024-05-14 PROCEDURE — 25000003 PHARM REV CODE 250: Performed by: EMERGENCY MEDICINE

## 2024-05-14 PROCEDURE — 99285 EMERGENCY DEPT VISIT HI MDM: CPT | Mod: 25

## 2024-05-14 PROCEDURE — 85025 COMPLETE CBC W/AUTO DIFF WBC: CPT | Performed by: EMERGENCY MEDICINE

## 2024-05-14 PROCEDURE — 80053 COMPREHEN METABOLIC PANEL: CPT | Performed by: EMERGENCY MEDICINE

## 2024-05-14 PROCEDURE — 84484 ASSAY OF TROPONIN QUANT: CPT | Performed by: EMERGENCY MEDICINE

## 2024-05-14 PROCEDURE — 83880 ASSAY OF NATRIURETIC PEPTIDE: CPT | Performed by: EMERGENCY MEDICINE

## 2024-05-14 PROCEDURE — 93010 ELECTROCARDIOGRAM REPORT: CPT | Mod: ,,, | Performed by: INTERNAL MEDICINE

## 2024-05-14 PROCEDURE — 96374 THER/PROPH/DIAG INJ IV PUSH: CPT

## 2024-05-14 PROCEDURE — 63700000 PHARM REV CODE 250 ALT 637 W/O HCPCS: Performed by: EMERGENCY MEDICINE

## 2024-05-14 PROCEDURE — 93005 ELECTROCARDIOGRAM TRACING: CPT

## 2024-05-14 PROCEDURE — 81025 URINE PREGNANCY TEST: CPT | Performed by: EMERGENCY MEDICINE

## 2024-05-14 PROCEDURE — 63600175 PHARM REV CODE 636 W HCPCS

## 2024-05-14 RX ORDER — AMOXICILLIN 500 MG/1
1000 TABLET, FILM COATED ORAL EVERY 8 HOURS
Qty: 30 TABLET | Refills: 0 | Status: SHIPPED | OUTPATIENT
Start: 2024-05-14 | End: 2024-05-19

## 2024-05-14 RX ORDER — KETOROLAC TROMETHAMINE 30 MG/ML
10 INJECTION, SOLUTION INTRAMUSCULAR; INTRAVENOUS
Status: COMPLETED | OUTPATIENT
Start: 2024-05-14 | End: 2024-05-14

## 2024-05-14 RX ORDER — AZITHROMYCIN 250 MG/1
500 TABLET, FILM COATED ORAL
Status: COMPLETED | OUTPATIENT
Start: 2024-05-14 | End: 2024-05-14

## 2024-05-14 RX ORDER — DOXYCYCLINE HYCLATE 100 MG
100 TABLET ORAL
Status: DISCONTINUED | OUTPATIENT
Start: 2024-05-14 | End: 2024-05-14

## 2024-05-14 RX ORDER — AZITHROMYCIN 250 MG/1
250 TABLET, FILM COATED ORAL DAILY
Qty: 6 TABLET | Refills: 0 | Status: SHIPPED | OUTPATIENT
Start: 2024-05-14

## 2024-05-14 RX ORDER — AMOXICILLIN 250 MG/1
1000 CAPSULE ORAL
Status: COMPLETED | OUTPATIENT
Start: 2024-05-14 | End: 2024-05-14

## 2024-05-14 RX ADMIN — KETOROLAC TROMETHAMINE 10 MG: 30 INJECTION, SOLUTION INTRAMUSCULAR at 12:05

## 2024-05-14 RX ADMIN — AZITHROMYCIN DIHYDRATE 500 MG: 250 TABLET ORAL at 01:05

## 2024-05-14 RX ADMIN — AMOXICILLIN 1000 MG: 250 CAPSULE ORAL at 01:05

## 2024-05-14 NOTE — DISCHARGE INSTRUCTIONS
You were seen in the emergency department for chest pain.  Your x-ray reveals you may have a pneumonia.  We have given you a prescription for antibiotics.  Please take them as directed.  Please follow-up with your primary care provider.  Please have repeat imaging done in approximately 4 weeks to ensure the pneumonia resolves.  Please follow-up with your primary care provider early this week.  Please return for any new or worsening chest pain, nausea, vomiting, difficulty breathing, coughing up blood, profuse sweating, dizziness, lightheadedness, numbness, weakness, or any other new or worsening concerns.
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
## 2024-05-14 NOTE — ED TRIAGE NOTES
"+heavy vaginal bleeding since Sunday. Pt reports changing tampon/pad every hour. Hx of tubal ligation and endometriosis. Pt reports hx of blood transfusion. Pt reports generalized fatigue, pt looks pale. Pt also reports left shoulder pain that radiates to her left upper back intermittently "for a long time now" but flared up again the past few days. Hx of torn rotator cuff to the left shoulder. No obvious deformity noted. Pt denies sob/cp but does report cough. Pt is a smoker. Pt aaox4, connected to continuous pulse ox, cardiac monitor and bp   "

## (undated) DEVICE — BLANKET UPPER BODY 78.7X29.9IN

## (undated) DEVICE — DRESSING AQUACEL FOAM 4 X 12

## (undated) DEVICE — PACK LAPAROSCOPY/PELVISCOPY II

## (undated) DEVICE — Device

## (undated) DEVICE — SEE MEDLINE ITEM 152622

## (undated) DEVICE — SEE MEDLINE ITEM 157117

## (undated) DEVICE — LINER GLOVE POWDERFREE SZ 7.5

## (undated) DEVICE — SPONGE LAP 18X18 PREWASHED

## (undated) DEVICE — SEE MEDLINE ITEM 157181

## (undated) DEVICE — DRESSING TEGADERM 2X2 3/4

## (undated) DEVICE — CLIPPER BLADE MOD 4406 (CAREF)

## (undated) DEVICE — RETAINER VISCERA FISH GREER MD

## (undated) DEVICE — SUT MONOCRYL 4-0 PS-2

## (undated) DEVICE — CLOSURE SKIN STERI STRIP 1/2X4

## (undated) DEVICE — TUBING INSUFFLATION 10

## (undated) DEVICE — TROCAR ENDOPATH XCEL 5X100MM

## (undated) DEVICE — SLEEVE SCD EXPRESS CALF MEDIUM

## (undated) DEVICE — SEE MEDLINE ITEM 154981

## (undated) DEVICE — GLOVE SURGICAL LATEX SZ 7

## (undated) DEVICE — GLOVE SURG BIOGEL LATEX SZ 7.5

## (undated) DEVICE — SUPPORT ULNA NERVE PROTECTOR

## (undated) DEVICE — GLOVE SURGICAL LATEX SZ 6.5

## (undated) DEVICE — SOL 9P NACL IRR PIC IL

## (undated) DEVICE — CANISTER SUCTION 2 LTR

## (undated) DEVICE — NDL INSUF ULTRA VERESS 120MM

## (undated) DEVICE — SEAL SCOPE WARMER 20/BX

## (undated) DEVICE — KIT ANTIFOG

## (undated) DEVICE — SHEARS HARMONIC 5CM 36CM

## (undated) DEVICE — SEE MEDLINE ITEM 156946

## (undated) DEVICE — SCISSOR CURVED ENDOPATH 5MM

## (undated) DEVICE — BLADE SURG CARBON STEEL SZ11

## (undated) DEVICE — SOL NS 1000CC

## (undated) DEVICE — PAD ABD 8X10 STERILE

## (undated) DEVICE — ELECTRODE REM PLYHSV RETURN 9

## (undated) DEVICE — PAD PREP 50/CA